# Patient Record
Sex: FEMALE | Race: WHITE | NOT HISPANIC OR LATINO
[De-identification: names, ages, dates, MRNs, and addresses within clinical notes are randomized per-mention and may not be internally consistent; named-entity substitution may affect disease eponyms.]

---

## 2017-06-24 ENCOUNTER — RECORD ABSTRACTING (OUTPATIENT)
Age: 67
End: 2017-06-24

## 2017-06-24 DIAGNOSIS — F17.200 NICOTINE DEPENDENCE, UNSPECIFIED, UNCOMPLICATED: ICD-10-CM

## 2017-06-24 DIAGNOSIS — Z80.3 FAMILY HISTORY OF MALIGNANT NEOPLASM OF BREAST: ICD-10-CM

## 2017-06-24 DIAGNOSIS — L42 PITYRIASIS ROSEA: ICD-10-CM

## 2017-06-24 DIAGNOSIS — H81.10 BENIGN PAROXYSMAL VERTIGO, UNSPECIFIED EAR: ICD-10-CM

## 2017-06-24 DIAGNOSIS — L23.9 ALLERGIC CONTACT DERMATITIS, UNSPECIFIED CAUSE: ICD-10-CM

## 2017-06-27 ENCOUNTER — APPOINTMENT (OUTPATIENT)
Dept: INTERNAL MEDICINE | Facility: CLINIC | Age: 67
End: 2017-06-27

## 2017-06-27 VITALS
RESPIRATION RATE: 18 BRPM | WEIGHT: 110 LBS | SYSTOLIC BLOOD PRESSURE: 148 MMHG | HEART RATE: 64 BPM | HEIGHT: 62 IN | DIASTOLIC BLOOD PRESSURE: 84 MMHG | TEMPERATURE: 97.8 F | OXYGEN SATURATION: 98 % | BODY MASS INDEX: 20.24 KG/M2

## 2017-06-27 DIAGNOSIS — M19.90 UNSPECIFIED OSTEOARTHRITIS, UNSPECIFIED SITE: ICD-10-CM

## 2017-06-27 RX ORDER — CALCIUM CARBONATE/VITAMIN D3 600 MG-20
600-400 TABLET,CHEWABLE ORAL
Refills: 0 | Status: DISCONTINUED | COMMUNITY
End: 2017-06-27

## 2017-06-27 RX ORDER — KRILL/OM-3/DHA/EPA/PHOSPHO/AST 350MG-90MG
CAPSULE ORAL
Refills: 0 | Status: DISCONTINUED | COMMUNITY
End: 2017-06-27

## 2017-12-06 ENCOUNTER — APPOINTMENT (OUTPATIENT)
Dept: INTERNAL MEDICINE | Facility: CLINIC | Age: 67
End: 2017-12-06
Payer: MEDICARE

## 2017-12-06 VITALS
HEART RATE: 77 BPM | BODY MASS INDEX: 19.69 KG/M2 | WEIGHT: 107 LBS | HEIGHT: 62 IN | OXYGEN SATURATION: 100 % | DIASTOLIC BLOOD PRESSURE: 75 MMHG | SYSTOLIC BLOOD PRESSURE: 118 MMHG

## 2017-12-06 DIAGNOSIS — M54.6 PAIN IN THORACIC SPINE: ICD-10-CM

## 2017-12-06 DIAGNOSIS — G89.29 PAIN IN THORACIC SPINE: ICD-10-CM

## 2017-12-06 PROCEDURE — 99214 OFFICE O/P EST MOD 30 MIN: CPT

## 2018-01-08 ENCOUNTER — FORM ENCOUNTER (OUTPATIENT)
Age: 68
End: 2018-01-08

## 2018-01-09 ENCOUNTER — OUTPATIENT (OUTPATIENT)
Dept: OUTPATIENT SERVICES | Facility: HOSPITAL | Age: 68
LOS: 1 days | End: 2018-01-09
Payer: MEDICARE

## 2018-01-09 ENCOUNTER — APPOINTMENT (OUTPATIENT)
Dept: MAMMOGRAPHY | Facility: HOSPITAL | Age: 68
End: 2018-01-09
Payer: MEDICARE

## 2018-01-09 DIAGNOSIS — Z00.8 ENCOUNTER FOR OTHER GENERAL EXAMINATION: ICD-10-CM

## 2018-01-09 PROCEDURE — 77067 SCR MAMMO BI INCL CAD: CPT | Mod: 26

## 2018-01-09 PROCEDURE — 77067 SCR MAMMO BI INCL CAD: CPT

## 2018-01-09 PROCEDURE — 77063 BREAST TOMOSYNTHESIS BI: CPT | Mod: 26

## 2018-01-09 PROCEDURE — 77063 BREAST TOMOSYNTHESIS BI: CPT

## 2018-05-08 ENCOUNTER — MEDICATION RENEWAL (OUTPATIENT)
Age: 68
End: 2018-05-08

## 2018-06-13 ENCOUNTER — APPOINTMENT (OUTPATIENT)
Dept: INTERNAL MEDICINE | Facility: CLINIC | Age: 68
End: 2018-06-13
Payer: MEDICARE

## 2018-06-13 VITALS
DIASTOLIC BLOOD PRESSURE: 80 MMHG | BODY MASS INDEX: 19.88 KG/M2 | HEIGHT: 62 IN | SYSTOLIC BLOOD PRESSURE: 124 MMHG | WEIGHT: 108 LBS

## 2018-06-13 DIAGNOSIS — Z23 ENCOUNTER FOR IMMUNIZATION: ICD-10-CM

## 2018-06-13 DIAGNOSIS — M85.80 OTHER SPECIFIED DISORDERS OF BONE DENSITY AND STRUCTURE, UNSPECIFIED SITE: ICD-10-CM

## 2018-06-13 PROCEDURE — 99497 ADVNCD CARE PLAN 30 MIN: CPT

## 2018-06-13 PROCEDURE — G0439: CPT

## 2018-06-13 NOTE — HEALTH RISK ASSESSMENT
[Very Good] : ~his/her~  mood as very good [No falls in past year] : Patient reported no falls in the past year [0] : 2) Feeling down, depressed, or hopeless: Not at all (0) [Patient reported mammogram was normal] : Patient reported mammogram was normal [Patient reported bone density results were abnormal] : Patient reported bone density results were abnormal [Patient reported colonoscopy was abnormal] : Patient reported colonoscopy was abnormal [HIV Test offered] : HIV Test offered [Hepatitis C test offered] : Hepatitis C test offered [None] : None [Alone] : lives alone [Retired] : retired [College] : College [] :  [# Of Children ___] : has [unfilled] children [Feels Safe at Home] : Feels safe at home [Fully functional (bathing, dressing, toileting, transferring, walking, feeding)] : Fully functional (bathing, dressing, toileting, transferring, walking, feeding) [Fully functional (using the telephone, shopping, preparing meals, housekeeping, doing laundry, using] : Fully functional and needs no help or supervision to perform IADLs (using the telephone, shopping, preparing meals, housekeeping, doing laundry, using transportation, managing medications and managing finances) [Smoke Detector] : smoke detector [Carbon Monoxide Detector] : carbon monoxide detector [Safety elements used in home] : safety elements used in home [Seat Belt] :  uses seat belt [Sunscreen] : uses sunscreen [Discussed at today's visit] : Advance Directives Discussed at today's visit [Patient's preferences updated] : Patient's preferences updated  [Designated Healthcare Proxy] : Designated healthcare proxy [Name: ___] : Health Care Proxy's Name: [unfilled]  [Relationship: ___] : Relationship: [unfilled] [Aggressive treatment] : aggressive treatment [FreeTextEntry1] : "children, finances" no major physical issues, just some arthritis and pain left fingers, Raynauds [] : No [de-identified] : none [de-identified] : rare/social [[id number]] : 0 [Change in mental status noted] : No change in mental status noted [Language] : denies difficulty with language [Behavior] : denies difficulty with behavior [Learning/Retaining New Information] : denies difficulty learning/retaining new information [Handling Complex Tasks] : denies difficulty handling complex tasks [Reasoning] : denies difficulty with reasoning [Spatial Ability and Orientation] : denies difficulty with spatial ability and orientation [Sexually Active] : not sexually active [High Risk Behavior] : no high risk behavior [Reports changes in hearing] : Reports no changes in hearing [Reports changes in vision] : Reports no changes in vision [Reports changes in dental health] : Reports no changes in dental health [Travel to Developing Areas] : does not  travel to developing areas [TB Exposure] : is not being exposed to tuberculosis [Caregiver Concerns] : does not have caregiver concerns [MammogramDate] : 1/2018 [PapSmearDate] : 2016 [BoneDensityDate] : 2016 [BoneDensityComments] : OSTEOPENIA [ColonoscopyDate] : 2008 [ColonoscopyComments] : REFUSES FURTHER; DIVERTICS [de-identified] : son lives next door [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

## 2018-06-14 ENCOUNTER — FORM ENCOUNTER (OUTPATIENT)
Age: 68
End: 2018-06-14

## 2018-06-15 ENCOUNTER — LABORATORY RESULT (OUTPATIENT)
Age: 68
End: 2018-06-15

## 2018-06-15 ENCOUNTER — OUTPATIENT (OUTPATIENT)
Dept: OUTPATIENT SERVICES | Facility: HOSPITAL | Age: 68
LOS: 1 days | End: 2018-06-15
Payer: MEDICARE

## 2018-06-15 ENCOUNTER — APPOINTMENT (OUTPATIENT)
Dept: RADIOLOGY | Facility: HOSPITAL | Age: 68
End: 2018-06-15
Payer: MEDICARE

## 2018-06-15 DIAGNOSIS — E78.00 PURE HYPERCHOLESTEROLEMIA, UNSPECIFIED: ICD-10-CM

## 2018-06-15 DIAGNOSIS — M85.80 OTHER SPECIFIED DISORDERS OF BONE DENSITY AND STRUCTURE, UNSPECIFIED SITE: ICD-10-CM

## 2018-06-15 LAB
ALBUMIN SERPL ELPH-MCNC: 4.3 G/DL
ALP BLD-CCNC: 71 U/L
ALT SERPL-CCNC: 13 U/L
ANION GAP SERPL CALC-SCNC: 17 MMOL/L
AST SERPL-CCNC: 19 U/L
BASOPHILS # BLD AUTO: 0.07 K/UL
BASOPHILS NFR BLD AUTO: 1 %
BILIRUB SERPL-MCNC: 0.4 MG/DL
BUN SERPL-MCNC: 15 MG/DL
CALCIUM SERPL-MCNC: 9.6 MG/DL
CHLORIDE SERPL-SCNC: 97 MMOL/L
CHOLEST SERPL-MCNC: 194 MG/DL
CHOLEST/HDLC SERPL: 2.6 RATIO
CO2 SERPL-SCNC: 24 MMOL/L
CREAT SERPL-MCNC: 0.65 MG/DL
EOSINOPHIL # BLD AUTO: 0.21 K/UL
EOSINOPHIL NFR BLD AUTO: 3.1 %
ERYTHROCYTE [SEDIMENTATION RATE] IN BLOOD BY WESTERGREN METHOD: 16 MM/HR
GLUCOSE SERPL-MCNC: 82 MG/DL
HBA1C MFR BLD HPLC: 5.5 %
HCT VFR BLD CALC: 40.2 %
HCV AB SER QL: NONREACTIVE
HCV S/CO RATIO: 0.09 S/CO
HDLC SERPL-MCNC: 75 MG/DL
HGB BLD-MCNC: 13.1 G/DL
IMM GRANULOCYTES NFR BLD AUTO: 0.1 %
LDLC SERPL CALC-MCNC: 106 MG/DL
LYMPHOCYTES # BLD AUTO: 1.6 K/UL
LYMPHOCYTES NFR BLD AUTO: 24 %
MAN DIFF?: NORMAL
MCHC RBC-ENTMCNC: 29 PG
MCHC RBC-ENTMCNC: 32.6 GM/DL
MCV RBC AUTO: 88.9 FL
MONOCYTES # BLD AUTO: 0.66 K/UL
MONOCYTES NFR BLD AUTO: 9.9 %
NEUTROPHILS # BLD AUTO: 4.13 K/UL
NEUTROPHILS NFR BLD AUTO: 61.9 %
PLATELET # BLD AUTO: 275 K/UL
POTASSIUM SERPL-SCNC: 4.4 MMOL/L
PROT SERPL-MCNC: 6.9 G/DL
RBC # BLD: 4.52 M/UL
RBC # FLD: 14.9 %
RHEUMATOID FACT SER QL: 11 IU/ML
SODIUM SERPL-SCNC: 138 MMOL/L
TRIGL SERPL-MCNC: 67 MG/DL
WBC # FLD AUTO: 6.68 K/UL

## 2018-06-15 PROCEDURE — 77080 DXA BONE DENSITY AXIAL: CPT | Mod: 26

## 2018-06-15 PROCEDURE — 77080 DXA BONE DENSITY AXIAL: CPT

## 2018-06-18 LAB
25(OH)D3 SERPL-MCNC: 34.5 NG/ML
CCP AB SER IA-ACNC: 11 UNITS
ENA SCL70 IGG SER IA-ACNC: <0.2 AL
ENA SS-A AB SER IA-ACNC: <0.2 AL
ENA SS-B AB SER IA-ACNC: <0.2 AL
HIV1+2 AB SPEC QL IA.RAPID: NONREACTIVE
RF+CCP IGG SER-IMP: NEGATIVE
T4 FREE SERPL-MCNC: 1 NG/DL
T4 SERPL-MCNC: 5.8 UG/DL
TSH SERPL-ACNC: 1.73 UIU/ML

## 2018-06-22 LAB
ANA PAT FLD IF-IMP: ABNORMAL
ANACR T: ABNORMAL

## 2019-05-08 ENCOUNTER — RX RENEWAL (OUTPATIENT)
Age: 69
End: 2019-05-08

## 2019-06-04 ENCOUNTER — APPOINTMENT (OUTPATIENT)
Dept: INTERNAL MEDICINE | Facility: CLINIC | Age: 69
End: 2019-06-04
Payer: MEDICARE

## 2019-06-04 VITALS
HEIGHT: 62 IN | DIASTOLIC BLOOD PRESSURE: 80 MMHG | TEMPERATURE: 97.8 F | WEIGHT: 106 LBS | BODY MASS INDEX: 19.51 KG/M2 | SYSTOLIC BLOOD PRESSURE: 130 MMHG | RESPIRATION RATE: 17 BRPM

## 2019-06-04 PROCEDURE — 99214 OFFICE O/P EST MOD 30 MIN: CPT

## 2019-06-04 NOTE — PHYSICAL EXAM
[No Acute Distress] : no acute distress [Well Nourished] : well nourished [Well Developed] : well developed [Well-Appearing] : well-appearing [PERRL] : pupils equal round and reactive to light [Normal Sclera/Conjunctiva] : normal sclera/conjunctiva [EOMI] : extraocular movements intact [Normal Outer Ear/Nose] : the outer ears and nose were normal in appearance [Normal Oropharynx] : the oropharynx was normal [No Lymphadenopathy] : no lymphadenopathy [Supple] : supple [No JVD] : no jugular venous distention [No Respiratory Distress] : no respiratory distress  [Thyroid Normal, No Nodules] : the thyroid was normal and there were no nodules present [Clear to Auscultation] : lungs were clear to auscultation bilaterally [No Accessory Muscle Use] : no accessory muscle use [Normal Rate] : normal rate  [Regular Rhythm] : with a regular rhythm [No Murmur] : no murmur heard [Normal S1, S2] : normal S1 and S2 [No Abdominal Bruit] : a ~M bruit was not heard ~T in the abdomen [No Varicosities] : no varicosities [No Carotid Bruits] : no carotid bruits [No Edema] : there was no peripheral edema [Pedal Pulses Present] : the pedal pulses are present [No Palpable Aorta] : no palpable aorta [Soft] : abdomen soft [No Extremity Clubbing/Cyanosis] : no extremity clubbing/cyanosis [Non-distended] : non-distended [Non Tender] : non-tender [Normal Bowel Sounds] : normal bowel sounds [No Masses] : no abdominal mass palpated [No HSM] : no HSM [Normal Anterior Cervical Nodes] : no anterior cervical lymphadenopathy [Normal Posterior Cervical Nodes] : no posterior cervical lymphadenopathy [No Spinal Tenderness] : no spinal tenderness [No Joint Swelling] : no joint swelling [No CVA Tenderness] : no CVA  tenderness [Grossly Normal Strength/Tone] : grossly normal strength/tone [No Rash] : no rash [Coordination Grossly Intact] : coordination grossly intact [Normal Gait] : normal gait [Deep Tendon Reflexes (DTR)] : deep tendon reflexes were 2+ and symmetric [No Focal Deficits] : no focal deficits [Normal Insight/Judgement] : insight and judgment were intact [Normal Affect] : the affect was normal

## 2019-06-04 NOTE — HISTORY OF PRESENT ILLNESS
[FreeTextEntry1] : Back pain [de-identified] : Got a new mattress and yoga mat and doing stretching\par -doing well\par -not taking aleve\par -had a good winter no major issues shoveling snow and didn't have pain\par -

## 2019-11-27 LAB
25(OH)D3 SERPL-MCNC: 42.3 NG/ML
ALBUMIN SERPL ELPH-MCNC: 4.5 G/DL
ALP BLD-CCNC: 68 U/L
ALT SERPL-CCNC: 13 U/L
ANION GAP SERPL CALC-SCNC: 12 MMOL/L
AST SERPL-CCNC: 17 U/L
BASOPHILS # BLD AUTO: 0.08 K/UL
BASOPHILS NFR BLD AUTO: 1.3 %
BILIRUB SERPL-MCNC: 0.5 MG/DL
BUN SERPL-MCNC: 13 MG/DL
CALCIUM SERPL-MCNC: 9.7 MG/DL
CHLORIDE SERPL-SCNC: 99 MMOL/L
CHOLEST SERPL-MCNC: 191 MG/DL
CHOLEST/HDLC SERPL: 2.3 RATIO
CO2 SERPL-SCNC: 25 MMOL/L
CREAT SERPL-MCNC: 0.6 MG/DL
EOSINOPHIL # BLD AUTO: 0.23 K/UL
EOSINOPHIL NFR BLD AUTO: 3.6 %
ESTIMATED AVERAGE GLUCOSE: 105 MG/DL
GLUCOSE SERPL-MCNC: 97 MG/DL
HBA1C MFR BLD HPLC: 5.3 %
HCT VFR BLD CALC: 39.4 %
HDLC SERPL-MCNC: 82 MG/DL
HGB BLD-MCNC: 13.1 G/DL
IMM GRANULOCYTES NFR BLD AUTO: 0.2 %
LDLC SERPL CALC-MCNC: 92 MG/DL
LYMPHOCYTES # BLD AUTO: 1.41 K/UL
LYMPHOCYTES NFR BLD AUTO: 22.2 %
MAN DIFF?: NORMAL
MCHC RBC-ENTMCNC: 30.5 PG
MCHC RBC-ENTMCNC: 33.2 GM/DL
MCV RBC AUTO: 91.6 FL
MONOCYTES # BLD AUTO: 0.77 K/UL
MONOCYTES NFR BLD AUTO: 12.1 %
NEUTROPHILS # BLD AUTO: 3.85 K/UL
NEUTROPHILS NFR BLD AUTO: 60.6 %
PLATELET # BLD AUTO: 275 K/UL
POTASSIUM SERPL-SCNC: 4.7 MMOL/L
PROT SERPL-MCNC: 6.7 G/DL
RBC # BLD: 4.3 M/UL
RBC # FLD: 14.3 %
SODIUM SERPL-SCNC: 136 MMOL/L
TRIGL SERPL-MCNC: 87 MG/DL
TSH SERPL-ACNC: 1.69 UIU/ML
WBC # FLD AUTO: 6.35 K/UL

## 2019-12-09 ENCOUNTER — FORM ENCOUNTER (OUTPATIENT)
Age: 69
End: 2019-12-09

## 2019-12-10 ENCOUNTER — APPOINTMENT (OUTPATIENT)
Dept: RADIOLOGY | Facility: HOSPITAL | Age: 69
End: 2019-12-10
Payer: MEDICARE

## 2019-12-10 ENCOUNTER — OUTPATIENT (OUTPATIENT)
Dept: OUTPATIENT SERVICES | Facility: HOSPITAL | Age: 69
LOS: 1 days | End: 2019-12-10
Payer: MEDICARE

## 2019-12-10 ENCOUNTER — APPOINTMENT (OUTPATIENT)
Dept: INTERNAL MEDICINE | Facility: CLINIC | Age: 69
End: 2019-12-10
Payer: MEDICARE

## 2019-12-10 VITALS
WEIGHT: 104 LBS | BODY MASS INDEX: 19.14 KG/M2 | SYSTOLIC BLOOD PRESSURE: 130 MMHG | HEART RATE: 70 BPM | DIASTOLIC BLOOD PRESSURE: 70 MMHG | RESPIRATION RATE: 17 BRPM | TEMPERATURE: 97.7 F | HEIGHT: 62 IN

## 2019-12-10 DIAGNOSIS — R42 DIZZINESS AND GIDDINESS: ICD-10-CM

## 2019-12-10 DIAGNOSIS — M25.649 STIFFNESS OF UNSPECIFIED HAND, NOT ELSEWHERE CLASSIFIED: ICD-10-CM

## 2019-12-10 DIAGNOSIS — I73.00 RAYNAUD'S SYNDROME W/OUT GANGRENE: ICD-10-CM

## 2019-12-10 PROCEDURE — 73120 X-RAY EXAM OF HAND: CPT | Mod: 26,50

## 2019-12-10 PROCEDURE — 99214 OFFICE O/P EST MOD 30 MIN: CPT

## 2019-12-10 PROCEDURE — 73120 X-RAY EXAM OF HAND: CPT

## 2019-12-10 NOTE — PHYSICAL EXAM
[No Acute Distress] : no acute distress [Well Nourished] : well nourished [Well Developed] : well developed [Well-Appearing] : well-appearing [Normal Sclera/Conjunctiva] : normal sclera/conjunctiva [PERRL] : pupils equal round and reactive to light [EOMI] : extraocular movements intact [Normal Outer Ear/Nose] : the outer ears and nose were normal in appearance [Normal Oropharynx] : the oropharynx was normal [No JVD] : no jugular venous distention [No Lymphadenopathy] : no lymphadenopathy [Supple] : supple [Thyroid Normal, No Nodules] : the thyroid was normal and there were no nodules present [No Respiratory Distress] : no respiratory distress  [No Accessory Muscle Use] : no accessory muscle use [Clear to Auscultation] : lungs were clear to auscultation bilaterally [Regular Rhythm] : with a regular rhythm [Normal Rate] : normal rate  [Normal S1, S2] : normal S1 and S2 [No Murmur] : no murmur heard [No Carotid Bruits] : no carotid bruits [No Varicosities] : no varicosities [No Abdominal Bruit] : a ~M bruit was not heard ~T in the abdomen [Pedal Pulses Present] : the pedal pulses are present [No Edema] : there was no peripheral edema [No Palpable Aorta] : no palpable aorta [No Extremity Clubbing/Cyanosis] : no extremity clubbing/cyanosis [Soft] : abdomen soft [Non Tender] : non-tender [Non-distended] : non-distended [No Masses] : no abdominal mass palpated [No HSM] : no HSM [Normal Bowel Sounds] : normal bowel sounds [Normal Posterior Cervical Nodes] : no posterior cervical lymphadenopathy [Normal Anterior Cervical Nodes] : no anterior cervical lymphadenopathy [No CVA Tenderness] : no CVA  tenderness [No Spinal Tenderness] : no spinal tenderness [No Joint Swelling] : no joint swelling [Grossly Normal Strength/Tone] : grossly normal strength/tone [No Rash] : no rash [Coordination Grossly Intact] : coordination grossly intact [No Focal Deficits] : no focal deficits [Normal Gait] : normal gait [Deep Tendon Reflexes (DTR)] : deep tendon reflexes were 2+ and symmetric [Normal Affect] : the affect was normal [Normal Insight/Judgement] : insight and judgment were intact [de-identified] : fingers were cold and at some sites white  but 2+ radial pulses

## 2019-12-10 NOTE — HISTORY OF PRESENT ILLNESS
[FreeTextEntry1] : Hand swelling and toe pain [de-identified] : Since the summer her Lisinopril generic was changed and pill now bigger\par -has hand swelling and stiffness in AM\par -right toe that previously injured was taking long to heal a sore on the bottom\par \par Also felt occasional lightheadedness\par -when bending down\par -occasionally, 1-2x per week

## 2020-06-17 ENCOUNTER — APPOINTMENT (OUTPATIENT)
Dept: INTERNAL MEDICINE | Facility: CLINIC | Age: 70
End: 2020-06-17
Payer: MEDICARE

## 2020-06-17 VITALS
TEMPERATURE: 98.1 F | HEIGHT: 62 IN | OXYGEN SATURATION: 100 % | RESPIRATION RATE: 17 BRPM | SYSTOLIC BLOOD PRESSURE: 130 MMHG | WEIGHT: 105 LBS | DIASTOLIC BLOOD PRESSURE: 70 MMHG | BODY MASS INDEX: 19.32 KG/M2 | HEART RATE: 72 BPM

## 2020-06-17 DIAGNOSIS — Z28.21 IMMUNIZATION NOT CARRIED OUT BECAUSE OF PATIENT REFUSAL: ICD-10-CM

## 2020-06-17 PROCEDURE — 99213 OFFICE O/P EST LOW 20 MIN: CPT

## 2020-06-17 RX ORDER — HYDROCORTISONE 25 MG/G
2.5 CREAM TOPICAL
Refills: 0 | Status: DISCONTINUED | COMMUNITY
End: 2020-06-17

## 2020-06-17 RX ORDER — ZOSTER VACCINE RECOMBINANT, ADJUVANTED 50 MCG/0.5
50 KIT INTRAMUSCULAR
Qty: 1 | Refills: 0 | Status: DISCONTINUED | OUTPATIENT
Start: 2018-06-13 | End: 2020-06-17

## 2020-06-17 RX ORDER — NYSTATIN AND TRIAMCINOLONE ACETONIDE 100000; 1 MG/G; MG/G
100000-0.1 CREAM TOPICAL
Refills: 0 | Status: DISCONTINUED | COMMUNITY
End: 2020-06-17

## 2020-06-17 NOTE — PHYSICAL EXAM
[No Acute Distress] : no acute distress [Well Nourished] : well nourished [Well Developed] : well developed [Normal Sclera/Conjunctiva] : normal sclera/conjunctiva [Well-Appearing] : well-appearing [PERRL] : pupils equal round and reactive to light [Normal Outer Ear/Nose] : the outer ears and nose were normal in appearance [EOMI] : extraocular movements intact [Normal Oropharynx] : the oropharynx was normal [No JVD] : no jugular venous distention [No Lymphadenopathy] : no lymphadenopathy [Supple] : supple [Thyroid Normal, No Nodules] : the thyroid was normal and there were no nodules present [No Accessory Muscle Use] : no accessory muscle use [No Respiratory Distress] : no respiratory distress  [Clear to Auscultation] : lungs were clear to auscultation bilaterally [Normal Rate] : normal rate  [Regular Rhythm] : with a regular rhythm [Normal S1, S2] : normal S1 and S2 [No Murmur] : no murmur heard [No Carotid Bruits] : no carotid bruits [No Varicosities] : no varicosities [No Abdominal Bruit] : a ~M bruit was not heard ~T in the abdomen [Pedal Pulses Present] : the pedal pulses are present [No Edema] : there was no peripheral edema [No Palpable Aorta] : no palpable aorta [No Extremity Clubbing/Cyanosis] : no extremity clubbing/cyanosis [Soft] : abdomen soft [Non Tender] : non-tender [No Masses] : no abdominal mass palpated [Non-distended] : non-distended [No HSM] : no HSM [Normal Posterior Cervical Nodes] : no posterior cervical lymphadenopathy [Normal Bowel Sounds] : normal bowel sounds [Normal Anterior Cervical Nodes] : no anterior cervical lymphadenopathy [No CVA Tenderness] : no CVA  tenderness [No Spinal Tenderness] : no spinal tenderness [No Joint Swelling] : no joint swelling [Grossly Normal Strength/Tone] : grossly normal strength/tone [No Rash] : no rash [No Focal Deficits] : no focal deficits [Coordination Grossly Intact] : coordination grossly intact [Normal Gait] : normal gait [Deep Tendon Reflexes (DTR)] : deep tendon reflexes were 2+ and symmetric [Normal Affect] : the affect was normal [Normal Insight/Judgement] : insight and judgment were intact

## 2020-06-17 NOTE — HISTORY OF PRESENT ILLNESS
[FreeTextEntry1] : General follow  [de-identified] : Had a friend who  of COVID-19\par -states local IGA had some COVID\par Cousin  also COVID\par \par in  had cold symptoms and thinks she might have had COVID  \par -3 week flu like illness\par

## 2020-07-13 ENCOUNTER — OUTPATIENT (OUTPATIENT)
Dept: OUTPATIENT SERVICES | Facility: HOSPITAL | Age: 70
LOS: 1 days | End: 2020-07-13
Payer: MEDICARE

## 2020-07-13 ENCOUNTER — APPOINTMENT (OUTPATIENT)
Dept: MAMMOGRAPHY | Facility: HOSPITAL | Age: 70
End: 2020-07-13
Payer: MEDICARE

## 2020-07-13 ENCOUNTER — RESULT REVIEW (OUTPATIENT)
Age: 70
End: 2020-07-13

## 2020-07-13 DIAGNOSIS — Z00.8 ENCOUNTER FOR OTHER GENERAL EXAMINATION: ICD-10-CM

## 2020-07-13 PROCEDURE — 77067 SCR MAMMO BI INCL CAD: CPT | Mod: 26

## 2020-07-13 PROCEDURE — 77067 SCR MAMMO BI INCL CAD: CPT

## 2020-07-13 PROCEDURE — 77063 BREAST TOMOSYNTHESIS BI: CPT

## 2020-07-13 PROCEDURE — 77063 BREAST TOMOSYNTHESIS BI: CPT | Mod: 26

## 2020-11-05 DIAGNOSIS — Z11.59 ENCOUNTER FOR SCREENING FOR OTHER VIRAL DISEASES: ICD-10-CM

## 2020-11-10 LAB
25(OH)D3 SERPL-MCNC: 45.2 NG/ML
ALBUMIN SERPL ELPH-MCNC: 4.7 G/DL
ALP BLD-CCNC: 75 U/L
ALT SERPL-CCNC: 15 U/L
ANION GAP SERPL CALC-SCNC: 13 MMOL/L
AST SERPL-CCNC: 21 U/L
BASOPHILS # BLD AUTO: 0.08 K/UL
BASOPHILS NFR BLD AUTO: 0.8 %
BILIRUB SERPL-MCNC: 0.4 MG/DL
BUN SERPL-MCNC: 15 MG/DL
CALCIUM SERPL-MCNC: 9.6 MG/DL
CHLORIDE SERPL-SCNC: 98 MMOL/L
CHOLEST SERPL-MCNC: 201 MG/DL
CO2 SERPL-SCNC: 26 MMOL/L
CREAT SERPL-MCNC: 0.61 MG/DL
EOSINOPHIL # BLD AUTO: 0.1 K/UL
EOSINOPHIL NFR BLD AUTO: 1.1 %
ESTIMATED AVERAGE GLUCOSE: 105 MG/DL
GLUCOSE SERPL-MCNC: 92 MG/DL
HBA1C MFR BLD HPLC: 5.3 %
HCT VFR BLD CALC: 39.4 %
HDLC SERPL-MCNC: 80 MG/DL
HGB BLD-MCNC: 12.8 G/DL
IMM GRANULOCYTES NFR BLD AUTO: 0.2 %
LDLC SERPL CALC-MCNC: 106 MG/DL
LYMPHOCYTES # BLD AUTO: 0.77 K/UL
LYMPHOCYTES NFR BLD AUTO: 8.1 %
MAN DIFF?: NORMAL
MCHC RBC-ENTMCNC: 30.3 PG
MCHC RBC-ENTMCNC: 32.5 GM/DL
MCV RBC AUTO: 93.4 FL
MONOCYTES # BLD AUTO: 0.8 K/UL
MONOCYTES NFR BLD AUTO: 8.5 %
NEUTROPHILS # BLD AUTO: 7.69 K/UL
NEUTROPHILS NFR BLD AUTO: 81.3 %
NONHDLC SERPL-MCNC: 121 MG/DL
PLATELET # BLD AUTO: 282 K/UL
POTASSIUM SERPL-SCNC: 4.3 MMOL/L
PROT SERPL-MCNC: 6.5 G/DL
RBC # BLD: 4.22 M/UL
RBC # FLD: 14.4 %
SARS-COV-2 IGG SERPL IA-ACNC: <0.1 INDEX
SARS-COV-2 IGG SERPL QL IA: NEGATIVE
SODIUM SERPL-SCNC: 137 MMOL/L
TRIGL SERPL-MCNC: 71 MG/DL
TSH SERPL-ACNC: 0.87 UIU/ML
WBC # FLD AUTO: 9.46 K/UL

## 2020-12-16 ENCOUNTER — APPOINTMENT (OUTPATIENT)
Dept: INTERNAL MEDICINE | Facility: CLINIC | Age: 70
End: 2020-12-16

## 2021-06-16 ENCOUNTER — APPOINTMENT (OUTPATIENT)
Dept: INTERNAL MEDICINE | Facility: CLINIC | Age: 71
End: 2021-06-16
Payer: MEDICARE

## 2021-06-16 ENCOUNTER — NON-APPOINTMENT (OUTPATIENT)
Age: 71
End: 2021-06-16

## 2021-06-16 VITALS
WEIGHT: 102 LBS | BODY MASS INDEX: 18.77 KG/M2 | HEIGHT: 62 IN | TEMPERATURE: 97.9 F | RESPIRATION RATE: 17 BRPM | HEART RATE: 73 BPM | DIASTOLIC BLOOD PRESSURE: 74 MMHG | SYSTOLIC BLOOD PRESSURE: 110 MMHG

## 2021-06-16 DIAGNOSIS — Z00.00 ENCOUNTER FOR GENERAL ADULT MEDICAL EXAMINATION W/OUT ABNORMAL FINDINGS: ICD-10-CM

## 2021-06-16 DIAGNOSIS — M25.649 STIFFNESS OF UNSPECIFIED HAND, NOT ELSEWHERE CLASSIFIED: ICD-10-CM

## 2021-06-16 PROCEDURE — G0442 ANNUAL ALCOHOL SCREEN 15 MIN: CPT | Mod: 59

## 2021-06-16 PROCEDURE — 93000 ELECTROCARDIOGRAM COMPLETE: CPT | Mod: 59

## 2021-06-16 PROCEDURE — G0438: CPT

## 2021-06-16 RX ORDER — CHOLECALCIFEROL (VITAMIN D3) 25 MCG
TABLET ORAL
Refills: 0 | Status: ACTIVE | COMMUNITY

## 2021-06-16 NOTE — HEALTH RISK ASSESSMENT
[Good] : ~his/her~  mood as  good [Yes] : Yes [2 - 4 times a month (2 pts)] : 2-4 times a month (2 points) [1 or 2 (0 pts)] : 1 or 2 (0 points) [Never (0 pts)] : Never (0 points) [No] : In the past 12 months have you used drugs other than those required for medical reasons? No [No falls in past year] : Patient reported no falls in the past year [0] : 2) Feeling down, depressed, or hopeless: Not at all (0) [(PHQ-2) Unable to screen] : PHQ-2: unable to screen [Patient reported mammogram was normal] : Patient reported mammogram was normal [Patient reported PAP Smear was normal] : Patient reported PAP Smear was normal [Patient reported bone density results were normal] : Patient reported bone density results were normal [Patient declined colonoscopy] : Patient declined colonoscopy [HIV test declined] : HIV test declined [Hepatitis C test declined] : Hepatitis C test declined [None] : None [Alone] : lives alone [Retired] : retired [College] : College [Single] : single [Feels Safe at Home] : Feels safe at home [Fully functional (bathing, dressing, toileting, transferring, walking, feeding)] : Fully functional (bathing, dressing, toileting, transferring, walking, feeding) [] : No [de-identified] : none [de-identified] : none [de-identified] : exercising more [de-identified] : healthy [[id number]] : 0 [Change in mental status noted] : No change in mental status noted [Language] : denies difficulty with language [Behavior] : denies difficulty with behavior [Learning/Retaining New Information] : denies difficulty learning/retaining new information [Handling Complex Tasks] : denies difficulty handling complex tasks [Reasoning] : denies difficulty with reasoning [Spatial Ability and Orientation] : denies difficulty with spatial ability and orientation [Sexually Active] : not sexually active [High Risk Behavior] : no high risk behavior [Reports changes in hearing] : Reports no changes in hearing [Reports changes in vision] : Reports no changes in vision [Reports changes in dental health] : Reports no changes in dental health [MammogramDate] : 7/2020 [PapSmearDate] : 2018 [BoneDensityDate] : 2018 [ColonoscopyDate] : 2008 [ColonoscopyComments] : Refuses stool test

## 2021-06-16 NOTE — HISTORY OF PRESENT ILLNESS
[FreeTextEntry1] : Here for annual [de-identified] : Had Moderna and still tired since beg May\par Fatigued\par  - taking B vitamins slight relief\par -exercising more doing pushups on stairs, stretching doing more and more\par \par \par \par

## 2021-06-21 ENCOUNTER — LABORATORY RESULT (OUTPATIENT)
Age: 71
End: 2021-06-21

## 2021-07-09 ENCOUNTER — APPOINTMENT (OUTPATIENT)
Dept: INTERNAL MEDICINE | Facility: CLINIC | Age: 71
End: 2021-07-09

## 2021-07-09 LAB
25(OH)D3 SERPL-MCNC: 44.6 NG/ML
ALBUMIN SERPL ELPH-MCNC: 4.3 G/DL
ALP BLD-CCNC: 72 U/L
ALT SERPL-CCNC: 10 U/L
ANA PAT FLD IF-IMP: ABNORMAL
ANACR T: ABNORMAL
ANION GAP SERPL CALC-SCNC: 13 MMOL/L
APPEARANCE: CLEAR
AST SERPL-CCNC: 16 U/L
BACTERIA: NEGATIVE
BASOPHILS # BLD AUTO: 0.07 K/UL
BASOPHILS NFR BLD AUTO: 1.1 %
BILIRUB SERPL-MCNC: 0.4 MG/DL
BILIRUBIN URINE: NEGATIVE
BLOOD URINE: NEGATIVE
BUN SERPL-MCNC: 12 MG/DL
CALCIUM SERPL-MCNC: 9.8 MG/DL
CCP AB SER IA-ACNC: <8 UNITS
CHLORIDE SERPL-SCNC: 98 MMOL/L
CHOLEST SERPL-MCNC: 191 MG/DL
CO2 SERPL-SCNC: 23 MMOL/L
COLOR: NORMAL
CREAT SERPL-MCNC: 0.66 MG/DL
CRP SERPL-MCNC: <3 MG/L
EOSINOPHIL # BLD AUTO: 0.17 K/UL
EOSINOPHIL NFR BLD AUTO: 2.7 %
ERYTHROCYTE [SEDIMENTATION RATE] IN BLOOD BY WESTERGREN METHOD: 10 MM/HR
ESTIMATED AVERAGE GLUCOSE: 108 MG/DL
FOLATE SERPL-MCNC: 18.5 NG/ML
GLUCOSE QUALITATIVE U: NEGATIVE
GLUCOSE SERPL-MCNC: 99 MG/DL
HBA1C MFR BLD HPLC: 5.4 %
HCT VFR BLD CALC: 40.1 %
HDLC SERPL-MCNC: 80 MG/DL
HGB BLD-MCNC: 13.6 G/DL
HYALINE CASTS: 1 /LPF
IMM GRANULOCYTES NFR BLD AUTO: 0.2 %
KETONES URINE: NEGATIVE
LDLC SERPL CALC-MCNC: 94 MG/DL
LEUKOCYTE ESTERASE URINE: NEGATIVE
LYMPHOCYTES # BLD AUTO: 1.57 K/UL
LYMPHOCYTES NFR BLD AUTO: 25.2 %
MAN DIFF?: NORMAL
MCHC RBC-ENTMCNC: 30.7 PG
MCHC RBC-ENTMCNC: 33.9 GM/DL
MCV RBC AUTO: 90.5 FL
MICROSCOPIC-UA: NORMAL
MONOCYTES # BLD AUTO: 0.71 K/UL
MONOCYTES NFR BLD AUTO: 11.4 %
NEUTROPHILS # BLD AUTO: 3.71 K/UL
NEUTROPHILS NFR BLD AUTO: 59.4 %
NITRITE URINE: NEGATIVE
NONHDLC SERPL-MCNC: 111 MG/DL
PH URINE: 7
PLATELET # BLD AUTO: 271 K/UL
POTASSIUM SERPL-SCNC: 5 MMOL/L
PROT SERPL-MCNC: 6.5 G/DL
PROTEIN URINE: NEGATIVE
RBC # BLD: 4.43 M/UL
RBC # FLD: 14.3 %
RED BLOOD CELLS URINE: 2 /HPF
RF+CCP IGG SER-IMP: NEGATIVE
RHEUMATOID FACT SER QL: 12 IU/ML
SODIUM SERPL-SCNC: 134 MMOL/L
SPECIFIC GRAVITY URINE: 1.01
SQUAMOUS EPITHELIAL CELLS: 1 /HPF
T4 FREE SERPL-MCNC: 1 NG/DL
T4 SERPL-MCNC: 4.6 UG/DL
TRIGL SERPL-MCNC: 85 MG/DL
TSH SERPL-ACNC: 2.07 UIU/ML
UROBILINOGEN URINE: NORMAL
VIT B12 SERPL-MCNC: 820 PG/ML
WBC # FLD AUTO: 6.24 K/UL
WHITE BLOOD CELLS URINE: 2 /HPF

## 2021-09-07 ENCOUNTER — RX RENEWAL (OUTPATIENT)
Age: 71
End: 2021-09-07

## 2021-10-06 PROBLEM — Z28.21 REFUSED PNEUMOCOCCAL VACCINATION: Status: ACTIVE | Noted: 2018-06-13

## 2022-06-16 ENCOUNTER — APPOINTMENT (OUTPATIENT)
Dept: INTERNAL MEDICINE | Facility: CLINIC | Age: 72
End: 2022-06-16
Payer: MEDICARE

## 2022-06-16 VITALS
DIASTOLIC BLOOD PRESSURE: 70 MMHG | BODY MASS INDEX: 17.85 KG/M2 | RESPIRATION RATE: 14 BRPM | WEIGHT: 97 LBS | SYSTOLIC BLOOD PRESSURE: 110 MMHG | HEART RATE: 74 BPM | TEMPERATURE: 96.9 F | HEIGHT: 62 IN

## 2022-06-16 DIAGNOSIS — N39.0 URINARY TRACT INFECTION, SITE NOT SPECIFIED: ICD-10-CM

## 2022-06-16 DIAGNOSIS — J30.9 ALLERGIC RHINITIS, UNSPECIFIED: ICD-10-CM

## 2022-06-16 DIAGNOSIS — E78.00 PURE HYPERCHOLESTEROLEMIA, UNSPECIFIED: ICD-10-CM

## 2022-06-16 PROCEDURE — 99213 OFFICE O/P EST LOW 20 MIN: CPT

## 2022-06-16 RX ORDER — LISINOPRIL AND HYDROCHLOROTHIAZIDE TABLETS 10; 12.5 MG/1; MG/1
10-12.5 TABLET ORAL
Qty: 90 | Refills: 3 | Status: DISCONTINUED | COMMUNITY
Start: 1900-01-01 | End: 2022-06-16

## 2022-06-16 RX ORDER — LISINOPRIL AND HYDROCHLOROTHIAZIDE TABLETS 20; 12.5 MG/1; MG/1
20-12.5 TABLET ORAL
Qty: 90 | Refills: 3 | Status: DISCONTINUED | COMMUNITY
Start: 2021-09-07 | End: 2022-06-16

## 2022-06-16 NOTE — HISTORY OF PRESENT ILLNESS
[FreeTextEntry1] : UTI [de-identified] : had symptoms of UTI\par States she called in May no call back\par so decided to take cranberry caps and water and it subsided\par \par Pt then stated it improved but low back pain and burning and urine still cloudy\par \par \par \par

## 2022-06-20 LAB
APPEARANCE: ABNORMAL
BACTERIA UR CULT: ABNORMAL
BACTERIA: ABNORMAL
BILIRUBIN URINE: NEGATIVE
BLOOD URINE: ABNORMAL
COLOR: ABNORMAL
GLUCOSE QUALITATIVE U: NEGATIVE
HYALINE CASTS: 0 /LPF
KETONES URINE: ABNORMAL
LEUKOCYTE ESTERASE URINE: ABNORMAL
MICROSCOPIC-UA: NORMAL
NITRITE URINE: POSITIVE
PH URINE: 6.5
PROTEIN URINE: ABNORMAL
RED BLOOD CELLS URINE: 40 /HPF
SPECIFIC GRAVITY URINE: 1.02
SQUAMOUS EPITHELIAL CELLS: 1 /HPF
UROBILINOGEN URINE: NORMAL
WHITE BLOOD CELLS URINE: >720 /HPF

## 2022-07-13 ENCOUNTER — OUTPATIENT (OUTPATIENT)
Dept: OUTPATIENT SERVICES | Facility: HOSPITAL | Age: 72
LOS: 1 days | End: 2022-07-13
Payer: MEDICARE

## 2022-07-13 ENCOUNTER — RESULT REVIEW (OUTPATIENT)
Age: 72
End: 2022-07-13

## 2022-07-13 ENCOUNTER — APPOINTMENT (OUTPATIENT)
Dept: ULTRASOUND IMAGING | Facility: HOSPITAL | Age: 72
End: 2022-07-13

## 2022-07-13 ENCOUNTER — APPOINTMENT (OUTPATIENT)
Dept: MAMMOGRAPHY | Facility: HOSPITAL | Age: 72
End: 2022-07-13

## 2022-07-13 DIAGNOSIS — Z00.8 ENCOUNTER FOR OTHER GENERAL EXAMINATION: ICD-10-CM

## 2022-07-13 PROCEDURE — 77063 BREAST TOMOSYNTHESIS BI: CPT

## 2022-07-13 PROCEDURE — 77067 SCR MAMMO BI INCL CAD: CPT | Mod: 26

## 2022-07-13 PROCEDURE — 76641 ULTRASOUND BREAST COMPLETE: CPT | Mod: 26,50

## 2022-07-13 PROCEDURE — 77063 BREAST TOMOSYNTHESIS BI: CPT | Mod: 26

## 2022-07-13 PROCEDURE — 76641 ULTRASOUND BREAST COMPLETE: CPT

## 2022-07-13 PROCEDURE — 77067 SCR MAMMO BI INCL CAD: CPT

## 2022-07-17 LAB
25(OH)D3 SERPL-MCNC: 52 NG/ML
ALBUMIN SERPL ELPH-MCNC: 4.4 G/DL
ALP BLD-CCNC: 65 U/L
ALT SERPL-CCNC: 11 U/L
ANION GAP SERPL CALC-SCNC: 13 MMOL/L
AST SERPL-CCNC: 18 U/L
BASOPHILS # BLD AUTO: 0.07 K/UL
BASOPHILS NFR BLD AUTO: 1.1 %
BILIRUB SERPL-MCNC: 0.3 MG/DL
BUN SERPL-MCNC: 16 MG/DL
CALCIUM SERPL-MCNC: 9.5 MG/DL
CHLORIDE SERPL-SCNC: 104 MMOL/L
CHOLEST SERPL-MCNC: 189 MG/DL
CO2 SERPL-SCNC: 23 MMOL/L
CREAT SERPL-MCNC: 0.68 MG/DL
CRP SERPL HS-MCNC: 0.89 MG/L
EGFR: 92 ML/MIN/1.73M2
EOSINOPHIL # BLD AUTO: 0.16 K/UL
EOSINOPHIL NFR BLD AUTO: 2.4 %
ERYTHROCYTE [SEDIMENTATION RATE] IN BLOOD BY WESTERGREN METHOD: 16 MM/HR
ESTIMATED AVERAGE GLUCOSE: 108 MG/DL
GLUCOSE SERPL-MCNC: 91 MG/DL
HBA1C MFR BLD HPLC: 5.4 %
HCT VFR BLD CALC: 36.7 %
HDLC SERPL-MCNC: 76 MG/DL
HGB BLD-MCNC: 12.3 G/DL
IMM GRANULOCYTES NFR BLD AUTO: 0.3 %
LDLC SERPL CALC-MCNC: 99 MG/DL
LYMPHOCYTES # BLD AUTO: 1.32 K/UL
LYMPHOCYTES NFR BLD AUTO: 20.1 %
MAN DIFF?: NORMAL
MCHC RBC-ENTMCNC: 30.9 PG
MCHC RBC-ENTMCNC: 33.5 GM/DL
MCV RBC AUTO: 92.2 FL
MONOCYTES # BLD AUTO: 0.62 K/UL
MONOCYTES NFR BLD AUTO: 9.5 %
NEUTROPHILS # BLD AUTO: 4.37 K/UL
NEUTROPHILS NFR BLD AUTO: 66.6 %
NONHDLC SERPL-MCNC: 113 MG/DL
PLATELET # BLD AUTO: 272 K/UL
POTASSIUM SERPL-SCNC: 4.5 MMOL/L
PROT SERPL-MCNC: 6.7 G/DL
RBC # BLD: 3.98 M/UL
RBC # FLD: 14.6 %
SODIUM SERPL-SCNC: 140 MMOL/L
TRIGL SERPL-MCNC: 69 MG/DL
TSH SERPL-ACNC: 1.52 UIU/ML
WBC # FLD AUTO: 6.56 K/UL

## 2023-06-23 ENCOUNTER — NON-APPOINTMENT (OUTPATIENT)
Age: 73
End: 2023-06-23

## 2023-06-23 ENCOUNTER — APPOINTMENT (OUTPATIENT)
Dept: INTERNAL MEDICINE | Facility: CLINIC | Age: 73
End: 2023-06-23
Payer: MEDICARE

## 2023-06-23 VITALS
DIASTOLIC BLOOD PRESSURE: 98 MMHG | WEIGHT: 98.31 LBS | OXYGEN SATURATION: 95 % | TEMPERATURE: 98.7 F | HEART RATE: 88 BPM | SYSTOLIC BLOOD PRESSURE: 142 MMHG | BODY MASS INDEX: 18.09 KG/M2 | HEIGHT: 62 IN | RESPIRATION RATE: 16 BRPM

## 2023-06-23 DIAGNOSIS — R21 RASH AND OTHER NONSPECIFIC SKIN ERUPTION: ICD-10-CM

## 2023-06-23 DIAGNOSIS — Z00.00 ENCOUNTER FOR GENERAL ADULT MEDICAL EXAMINATION W/OUT ABNORMAL FINDINGS: ICD-10-CM

## 2023-06-23 DIAGNOSIS — I10 ESSENTIAL (PRIMARY) HYPERTENSION: ICD-10-CM

## 2023-06-23 PROCEDURE — 93000 ELECTROCARDIOGRAM COMPLETE: CPT

## 2023-06-23 RX ORDER — SULFAMETHOXAZOLE AND TRIMETHOPRIM 800; 160 MG/1; MG/1
800-160 TABLET ORAL
Qty: 10 | Refills: 0 | Status: COMPLETED | COMMUNITY
Start: 2022-06-16 | End: 2023-06-23

## 2023-06-23 RX ORDER — LISINOPRIL 5 MG/1
5 TABLET ORAL DAILY
Qty: 90 | Refills: 3 | Status: DISCONTINUED | COMMUNITY
Start: 2022-06-16 | End: 2023-06-23

## 2023-06-23 NOTE — HISTORY OF PRESENT ILLNESS
[de-identified] : Doing great\par has different lisinopril now and stopped it after 33 days different pill color and shape and developed rash around ankles "crusty line" itchy, no swelling only around ankles \par \par felt itchy around chest and abd\par \par  [FreeTextEntry1] : stoped BP meds

## 2023-06-23 NOTE — HEALTH RISK ASSESSMENT
[Excellent] : ~his/her~  mood as  excellent [No] : In the past 12 months have you used drugs other than those required for medical reasons? No [No falls in past year] : Patient reported no falls in the past year [0] : 2) Feeling down, depressed, or hopeless: Not at all (0) [PHQ-2 Positive] : PHQ-2 Positive [Patient reported mammogram was normal] : Patient reported mammogram was normal [Patient declined PAP Smear] : Patient declined PAP Smear [Patient declined bone density test] : Patient declined bone density test [Patient declined colonoscopy] : Patient declined colonoscopy [HIV test declined] : HIV test declined [Hepatitis C test declined] : Hepatitis C test declined [None] : None [] :  [Feels Safe at Home] : Feels safe at home [Fully functional (bathing, dressing, toileting, transferring, walking, feeding)] : Fully functional (bathing, dressing, toileting, transferring, walking, feeding) [Fully functional (using the telephone, shopping, preparing meals, housekeeping, doing laundry, using] : Fully functional and needs no help or supervision to perform IADLs (using the telephone, shopping, preparing meals, housekeeping, doing laundry, using transportation, managing medications and managing finances) [Yes] : Yes [2 - 4 times a month (2 pts)] : 2-4 times a month (2 points) [1 or 2 (0 pts)] : 1 or 2 (0 points) [Never (0 pts)] : Never (0 points) [Audit-CScore] : 2 [de-identified] : starting [de-identified] : good [PHH6Ietnh] : 0 [Change in mental status noted] : No change in mental status noted [Language] : denies difficulty with language [Behavior] : denies difficulty with behavior [Learning/Retaining New Information] : denies difficulty learning/retaining new information [Handling Complex Tasks] : denies difficulty handling complex tasks [Reasoning] : denies difficulty with reasoning [Spatial Ability and Orientation] : denies difficulty with spatial ability and orientation [Sexually Active] : not sexually active [Reports changes in hearing] : Reports no changes in hearing [Reports changes in vision] : Reports no changes in vision [Reports changes in dental health] : Reports no changes in dental health [MammogramDate] : 2022

## 2023-06-23 NOTE — PHYSICAL EXAM
[No Acute Distress] : no acute distress [Well Nourished] : well nourished [Well Developed] : well developed [Well-Appearing] : well-appearing [Normal Sclera/Conjunctiva] : normal sclera/conjunctiva [PERRL] : pupils equal round and reactive to light [EOMI] : extraocular movements intact [Normal Outer Ear/Nose] : the outer ears and nose were normal in appearance [Normal Oropharynx] : the oropharynx was normal [No JVD] : no jugular venous distention [No Lymphadenopathy] : no lymphadenopathy [Supple] : supple [Thyroid Normal, No Nodules] : the thyroid was normal and there were no nodules present [No Respiratory Distress] : no respiratory distress  [No Accessory Muscle Use] : no accessory muscle use [Clear to Auscultation] : lungs were clear to auscultation bilaterally [Normal Rate] : normal rate  [Regular Rhythm] : with a regular rhythm [Normal S1, S2] : normal S1 and S2 [No Murmur] : no murmur heard [No Carotid Bruits] : no carotid bruits [No Abdominal Bruit] : a ~M bruit was not heard ~T in the abdomen [No Varicosities] : no varicosities [Pedal Pulses Present] : the pedal pulses are present [No Edema] : there was no peripheral edema [No Palpable Aorta] : no palpable aorta [No Extremity Clubbing/Cyanosis] : no extremity clubbing/cyanosis [Soft] : abdomen soft [Non Tender] : non-tender [Non-distended] : non-distended [No Masses] : no abdominal mass palpated [No HSM] : no HSM [Normal Bowel Sounds] : normal bowel sounds [No CVA Tenderness] : no CVA  tenderness [No Spinal Tenderness] : no spinal tenderness [No Joint Swelling] : no joint swelling [Grossly Normal Strength/Tone] : grossly normal strength/tone [No Rash] : no rash [Coordination Grossly Intact] : coordination grossly intact [No Focal Deficits] : no focal deficits [Normal Gait] : normal gait [Deep Tendon Reflexes (DTR)] : deep tendon reflexes were 2+ and symmetric [Normal Affect] : the affect was normal [Normal Insight/Judgement] : insight and judgment were intact [de-identified] : ?vitiligo +venous insufficiency dermatitis bilat ankles

## 2023-06-23 NOTE — ASSESSMENT
[FreeTextEntry1] : exercise\par refuses all HM Colonoscopy\par refusing BP meds at this time will try exercise \par \par RTO 6mos for BP check

## 2023-07-11 LAB
25(OH)D3 SERPL-MCNC: 44.7 NG/ML
ALBUMIN SERPL ELPH-MCNC: 4.7 G/DL
ALP BLD-CCNC: 90 U/L
ALT SERPL-CCNC: 12 U/L
ANION GAP SERPL CALC-SCNC: 13 MMOL/L
APPEARANCE: CLEAR
AST SERPL-CCNC: 19 U/L
BACTERIA: NEGATIVE /HPF
BILIRUB SERPL-MCNC: 0.4 MG/DL
BILIRUBIN URINE: NEGATIVE
BLOOD URINE: NEGATIVE
BUN SERPL-MCNC: 19 MG/DL
CALCIUM SERPL-MCNC: 9.8 MG/DL
CAST: 1 /LPF
CHLORIDE SERPL-SCNC: 102 MMOL/L
CHOLEST SERPL-MCNC: 203 MG/DL
CO2 SERPL-SCNC: 27 MMOL/L
COLOR: YELLOW
CREAT SERPL-MCNC: 0.63 MG/DL
EGFR: 93 ML/MIN/1.73M2
EPITHELIAL CELLS: 3 /HPF
ESTIMATED AVERAGE GLUCOSE: 111 MG/DL
GLUCOSE QUALITATIVE U: NEGATIVE MG/DL
GLUCOSE SERPL-MCNC: 81 MG/DL
HBA1C MFR BLD HPLC: 5.5 %
HDLC SERPL-MCNC: 84 MG/DL
KETONES URINE: NEGATIVE MG/DL
LDLC SERPL CALC-MCNC: 105 MG/DL
LEUKOCYTE ESTERASE URINE: ABNORMAL
MICROSCOPIC-UA: NORMAL
NITRITE URINE: NEGATIVE
NONHDLC SERPL-MCNC: 119 MG/DL
PH URINE: 6.5
POTASSIUM SERPL-SCNC: 4.4 MMOL/L
PROT SERPL-MCNC: 7 G/DL
PROTEIN URINE: 30 MG/DL
RED BLOOD CELLS URINE: 1 /HPF
SODIUM SERPL-SCNC: 143 MMOL/L
SPECIFIC GRAVITY URINE: 1.02
TRIGL SERPL-MCNC: 73 MG/DL
TSH SERPL-ACNC: 2.28 UIU/ML
UROBILINOGEN URINE: 0.2 MG/DL
WHITE BLOOD CELLS URINE: 30 /HPF

## 2023-07-22 RX ORDER — NYSTATIN AND TRIAMCINOLONE ACETONIDE 100000; 1 MG/G; MG/G
100000-0.1 CREAM TOPICAL 3 TIMES DAILY
Qty: 1 | Refills: 5 | Status: ACTIVE | COMMUNITY
Start: 2023-06-23 | End: 1900-01-01

## 2025-04-09 ENCOUNTER — TRANSCRIPTION ENCOUNTER (OUTPATIENT)
Age: 75
End: 2025-04-09

## 2025-04-09 ENCOUNTER — INPATIENT (INPATIENT)
Facility: HOSPITAL | Age: 75
LOS: 8 days | Discharge: NURSING, REHAB OR LONG-TERM CARE | DRG: 66 | End: 2025-04-18
Attending: STUDENT IN AN ORGANIZED HEALTH CARE EDUCATION/TRAINING PROGRAM | Admitting: GENERAL ACUTE CARE HOSPITAL
Payer: MEDICARE

## 2025-04-09 ENCOUNTER — EMERGENCY (EMERGENCY)
Facility: HOSPITAL | Age: 75
LOS: 1 days | End: 2025-04-09
Attending: EMERGENCY MEDICINE | Admitting: EMERGENCY MEDICINE
Payer: MEDICARE

## 2025-04-09 VITALS
TEMPERATURE: 97 F | HEIGHT: 60 IN | RESPIRATION RATE: 16 BRPM | HEART RATE: 84 BPM | SYSTOLIC BLOOD PRESSURE: 197 MMHG | WEIGHT: 130.07 LBS | DIASTOLIC BLOOD PRESSURE: 113 MMHG | OXYGEN SATURATION: 98 %

## 2025-04-09 VITALS
DIASTOLIC BLOOD PRESSURE: 91 MMHG | HEART RATE: 66 BPM | OXYGEN SATURATION: 100 % | SYSTOLIC BLOOD PRESSURE: 164 MMHG | RESPIRATION RATE: 18 BRPM

## 2025-04-09 VITALS
DIASTOLIC BLOOD PRESSURE: 68 MMHG | SYSTOLIC BLOOD PRESSURE: 118 MMHG | OXYGEN SATURATION: 100 % | HEIGHT: 60 IN | HEART RATE: 68 BPM | RESPIRATION RATE: 15 BRPM

## 2025-04-09 DIAGNOSIS — Z98.890 OTHER SPECIFIED POSTPROCEDURAL STATES: Chronic | ICD-10-CM

## 2025-04-09 DIAGNOSIS — I62.9 NONTRAUMATIC INTRACRANIAL HEMORRHAGE, UNSPECIFIED: ICD-10-CM

## 2025-04-09 LAB
A1C WITH ESTIMATED AVERAGE GLUCOSE RESULT: 5.4 % — SIGNIFICANT CHANGE UP (ref 4–5.6)
ALBUMIN SERPL ELPH-MCNC: 4.3 G/DL — SIGNIFICANT CHANGE UP (ref 3.3–5.2)
ALBUMIN SERPL ELPH-MCNC: 4.4 G/DL — SIGNIFICANT CHANGE UP (ref 3.3–5)
ALP SERPL-CCNC: 83 U/L — SIGNIFICANT CHANGE UP (ref 40–120)
ALP SERPL-CCNC: 93 U/L — SIGNIFICANT CHANGE UP (ref 40–120)
ALT FLD-CCNC: 13 U/L — SIGNIFICANT CHANGE UP
ALT FLD-CCNC: 18 U/L — SIGNIFICANT CHANGE UP (ref 10–45)
ANION GAP SERPL CALC-SCNC: 16 MMOL/L — SIGNIFICANT CHANGE UP (ref 5–17)
ANION GAP SERPL CALC-SCNC: 18 MMOL/L — HIGH (ref 5–17)
APTT BLD: 49 SEC — HIGH (ref 24.5–35.6)
APTT BLD: 52.5 SEC — HIGH (ref 24.5–35.6)
AST SERPL-CCNC: 22 U/L — SIGNIFICANT CHANGE UP (ref 10–40)
AST SERPL-CCNC: 33 U/L — HIGH
BASOPHILS # BLD AUTO: 0.02 K/UL — SIGNIFICANT CHANGE UP (ref 0–0.2)
BASOPHILS # BLD AUTO: 0.03 K/UL — SIGNIFICANT CHANGE UP (ref 0–0.2)
BASOPHILS NFR BLD AUTO: 0.1 % — SIGNIFICANT CHANGE UP (ref 0–2)
BASOPHILS NFR BLD AUTO: 0.2 % — SIGNIFICANT CHANGE UP (ref 0–2)
BILIRUB SERPL-MCNC: 0.4 MG/DL — SIGNIFICANT CHANGE UP (ref 0.4–2)
BILIRUB SERPL-MCNC: 0.5 MG/DL — SIGNIFICANT CHANGE UP (ref 0.2–1.2)
BLD GP AB SCN SERPL QL: SIGNIFICANT CHANGE UP
BUN SERPL-MCNC: 12.9 MG/DL — SIGNIFICANT CHANGE UP (ref 8–20)
BUN SERPL-MCNC: 14 MG/DL — SIGNIFICANT CHANGE UP (ref 7–23)
CALCIUM SERPL-MCNC: 9.5 MG/DL — SIGNIFICANT CHANGE UP (ref 8.4–10.5)
CALCIUM SERPL-MCNC: 9.7 MG/DL — SIGNIFICANT CHANGE UP (ref 8.4–10.5)
CHLORIDE SERPL-SCNC: 94 MMOL/L — LOW (ref 96–108)
CHLORIDE SERPL-SCNC: 97 MMOL/L — SIGNIFICANT CHANGE UP (ref 96–108)
CHOLEST SERPL-MCNC: 211 MG/DL — HIGH
CO2 SERPL-SCNC: 21 MMOL/L — LOW (ref 22–29)
CO2 SERPL-SCNC: 23 MMOL/L — SIGNIFICANT CHANGE UP (ref 22–31)
CREAT SERPL-MCNC: 0.5 MG/DL — SIGNIFICANT CHANGE UP (ref 0.5–1.3)
CREAT SERPL-MCNC: 0.72 MG/DL — SIGNIFICANT CHANGE UP (ref 0.5–1.3)
EGFR: 87 ML/MIN/1.73M2 — SIGNIFICANT CHANGE UP
EGFR: 87 ML/MIN/1.73M2 — SIGNIFICANT CHANGE UP
EGFR: 98 ML/MIN/1.73M2 — SIGNIFICANT CHANGE UP
EGFR: 98 ML/MIN/1.73M2 — SIGNIFICANT CHANGE UP
EOSINOPHIL # BLD AUTO: 0 K/UL — SIGNIFICANT CHANGE UP (ref 0–0.5)
EOSINOPHIL # BLD AUTO: 0.01 K/UL — SIGNIFICANT CHANGE UP (ref 0–0.5)
EOSINOPHIL NFR BLD AUTO: 0 % — SIGNIFICANT CHANGE UP (ref 0–6)
EOSINOPHIL NFR BLD AUTO: 0.1 % — SIGNIFICANT CHANGE UP (ref 0–6)
ESTIMATED AVERAGE GLUCOSE: 108 MG/DL — SIGNIFICANT CHANGE UP (ref 68–114)
GLUCOSE SERPL-MCNC: 134 MG/DL — HIGH (ref 70–99)
GLUCOSE SERPL-MCNC: 161 MG/DL — HIGH (ref 70–99)
HCT VFR BLD CALC: 43.1 % — SIGNIFICANT CHANGE UP (ref 34.5–45)
HCT VFR BLD CALC: 44.3 % — SIGNIFICANT CHANGE UP (ref 34.5–45)
HDLC SERPL-MCNC: 95 MG/DL — SIGNIFICANT CHANGE UP
HGB BLD-MCNC: 14.5 G/DL — SIGNIFICANT CHANGE UP (ref 11.5–15.5)
HGB BLD-MCNC: 15.4 G/DL — SIGNIFICANT CHANGE UP (ref 11.5–15.5)
IMM GRANULOCYTES # BLD AUTO: 0.06 K/UL — SIGNIFICANT CHANGE UP (ref 0–0.07)
IMM GRANULOCYTES NFR BLD AUTO: 0.4 % — SIGNIFICANT CHANGE UP (ref 0–0.9)
IMM GRANULOCYTES NFR BLD AUTO: 0.4 % — SIGNIFICANT CHANGE UP (ref 0–0.9)
INR BLD: 1.1 RATIO — SIGNIFICANT CHANGE UP (ref 0.85–1.16)
INR BLD: 1.15 RATIO — SIGNIFICANT CHANGE UP (ref 0.85–1.16)
LDLC SERPL-MCNC: 107 MG/DL — HIGH
LIPID PNL WITH DIRECT LDL SERPL: 107 MG/DL — HIGH
LYMPHOCYTES # BLD AUTO: 0.65 K/UL — LOW (ref 1–3.3)
LYMPHOCYTES # BLD AUTO: 0.7 K/UL — LOW (ref 1–3.3)
LYMPHOCYTES # BLD AUTO: 5.1 % — LOW (ref 13–44)
LYMPHOCYTES NFR BLD AUTO: 4.7 % — LOW (ref 13–44)
MAGNESIUM SERPL-MCNC: 2 MG/DL — SIGNIFICANT CHANGE UP (ref 1.6–2.6)
MCHC RBC-ENTMCNC: 29.9 PG — SIGNIFICANT CHANGE UP (ref 27–34)
MCHC RBC-ENTMCNC: 30.6 PG — SIGNIFICANT CHANGE UP (ref 27–34)
MCHC RBC-ENTMCNC: 33.6 G/DL — SIGNIFICANT CHANGE UP (ref 32–36)
MCHC RBC-ENTMCNC: 34.8 G/DL — SIGNIFICANT CHANGE UP (ref 32–36)
MCV RBC AUTO: 88.1 FL — SIGNIFICANT CHANGE UP (ref 80–100)
MCV RBC AUTO: 88.9 FL — SIGNIFICANT CHANGE UP (ref 80–100)
MONOCYTES # BLD AUTO: 0.38 K/UL — SIGNIFICANT CHANGE UP (ref 0–0.9)
MONOCYTES # BLD AUTO: 0.77 K/UL — SIGNIFICANT CHANGE UP (ref 0–0.9)
MONOCYTES NFR BLD AUTO: 2.8 % — SIGNIFICANT CHANGE UP (ref 2–14)
MONOCYTES NFR BLD AUTO: 5.5 % — SIGNIFICANT CHANGE UP (ref 2–14)
NEUTROPHILS # BLD AUTO: 12.37 K/UL — HIGH (ref 1.8–7.4)
NEUTROPHILS # BLD AUTO: 12.54 K/UL — HIGH (ref 1.8–7.4)
NEUTROPHILS NFR BLD AUTO: 89.2 % — HIGH (ref 43–77)
NEUTROPHILS NFR BLD AUTO: 91.5 % — HIGH (ref 43–77)
NONHDLC SERPL-MCNC: 116 MG/DL — SIGNIFICANT CHANGE UP
NRBC # BLD AUTO: 0 K/UL — SIGNIFICANT CHANGE UP (ref 0–0)
NRBC # FLD: 0 K/UL — SIGNIFICANT CHANGE UP (ref 0–0)
NRBC BLD AUTO-RTO: 0 /100 WBCS — SIGNIFICANT CHANGE UP (ref 0–0)
NRBC BLD AUTO-RTO: 0 /100 WBCS — SIGNIFICANT CHANGE UP (ref 0–0)
PHOSPHATE SERPL-MCNC: 3.6 MG/DL — SIGNIFICANT CHANGE UP (ref 2.4–4.7)
PLATELET # BLD AUTO: 305 K/UL — SIGNIFICANT CHANGE UP (ref 150–400)
PLATELET # BLD AUTO: 317 K/UL — SIGNIFICANT CHANGE UP (ref 150–400)
PMV BLD: 10.6 FL — SIGNIFICANT CHANGE UP (ref 7–13)
POTASSIUM SERPL-MCNC: 3.8 MMOL/L — SIGNIFICANT CHANGE UP (ref 3.5–5.3)
POTASSIUM SERPL-MCNC: 5.5 MMOL/L — HIGH (ref 3.5–5.3)
POTASSIUM SERPL-SCNC: 3.8 MMOL/L — SIGNIFICANT CHANGE UP (ref 3.5–5.3)
POTASSIUM SERPL-SCNC: 5.5 MMOL/L — HIGH (ref 3.5–5.3)
PROT SERPL-MCNC: 7.5 G/DL — SIGNIFICANT CHANGE UP (ref 6.6–8.7)
PROT SERPL-MCNC: 8.2 G/DL — SIGNIFICANT CHANGE UP (ref 6–8.3)
PROTHROM AB SERPL-ACNC: 13 SEC — SIGNIFICANT CHANGE UP (ref 9.9–13.4)
PROTHROM AB SERPL-ACNC: 13.3 SEC — SIGNIFICANT CHANGE UP (ref 9.9–13.4)
RBC # BLD: 4.85 M/UL — SIGNIFICANT CHANGE UP (ref 3.8–5.2)
RBC # BLD: 5.03 M/UL — SIGNIFICANT CHANGE UP (ref 3.8–5.2)
RBC # FLD: 14.2 % — SIGNIFICANT CHANGE UP (ref 10.3–14.5)
RBC # FLD: 14.2 % — SIGNIFICANT CHANGE UP (ref 10.3–14.5)
SODIUM SERPL-SCNC: 133 MMOL/L — LOW (ref 135–145)
SODIUM SERPL-SCNC: 136 MMOL/L — SIGNIFICANT CHANGE UP (ref 135–145)
T3 SERPL-MCNC: 76 NG/DL — LOW (ref 80–200)
T4 AB SER-ACNC: 5.8 UG/DL — SIGNIFICANT CHANGE UP (ref 4.5–12)
TRIGL SERPL-MCNC: 51 MG/DL — SIGNIFICANT CHANGE UP
TROPONIN I, HIGH SENSITIVITY RESULT: 11 NG/L — SIGNIFICANT CHANGE UP
TROPONIN T, HIGH SENSITIVITY RESULT: 11 NG/L — SIGNIFICANT CHANGE UP (ref 0–51)
TSH SERPL-MCNC: 1.37 UIU/ML — SIGNIFICANT CHANGE UP (ref 0.27–4.2)
WBC # BLD: 13.71 K/UL — HIGH (ref 3.8–10.5)
WBC # BLD: 13.88 K/UL — HIGH (ref 3.8–10.5)
WBC # FLD AUTO: 13.71 K/UL — HIGH (ref 3.8–10.5)
WBC # FLD AUTO: 13.88 K/UL — HIGH (ref 3.8–10.5)

## 2025-04-09 PROCEDURE — 70498 CT ANGIOGRAPHY NECK: CPT | Mod: MC

## 2025-04-09 PROCEDURE — 71045 X-RAY EXAM CHEST 1 VIEW: CPT | Mod: 26

## 2025-04-09 PROCEDURE — 80061 LIPID PANEL: CPT

## 2025-04-09 PROCEDURE — 99291 CRITICAL CARE FIRST HOUR: CPT

## 2025-04-09 PROCEDURE — 86900 BLOOD TYPING SEROLOGIC ABO: CPT

## 2025-04-09 PROCEDURE — 36415 COLL VENOUS BLD VENIPUNCTURE: CPT

## 2025-04-09 PROCEDURE — 93005 ELECTROCARDIOGRAM TRACING: CPT

## 2025-04-09 PROCEDURE — 0042T: CPT

## 2025-04-09 PROCEDURE — 93010 ELECTROCARDIOGRAM REPORT: CPT

## 2025-04-09 PROCEDURE — 70450 CT HEAD/BRAIN W/O DYE: CPT | Mod: MC

## 2025-04-09 PROCEDURE — 86901 BLOOD TYPING SEROLOGIC RH(D): CPT

## 2025-04-09 PROCEDURE — 70496 CT ANGIOGRAPHY HEAD: CPT | Mod: MC

## 2025-04-09 PROCEDURE — 0042T: CPT | Mod: MC

## 2025-04-09 PROCEDURE — 85730 THROMBOPLASTIN TIME PARTIAL: CPT

## 2025-04-09 PROCEDURE — L9997: CPT

## 2025-04-09 PROCEDURE — 80053 COMPREHEN METABOLIC PANEL: CPT

## 2025-04-09 PROCEDURE — 84484 ASSAY OF TROPONIN QUANT: CPT

## 2025-04-09 PROCEDURE — 70498 CT ANGIOGRAPHY NECK: CPT | Mod: 26

## 2025-04-09 PROCEDURE — 70496 CT ANGIOGRAPHY HEAD: CPT | Mod: 26

## 2025-04-09 PROCEDURE — 70450 CT HEAD/BRAIN W/O DYE: CPT | Mod: 26,XU

## 2025-04-09 PROCEDURE — 96375 TX/PRO/DX INJ NEW DRUG ADDON: CPT

## 2025-04-09 PROCEDURE — 86850 RBC ANTIBODY SCREEN: CPT

## 2025-04-09 PROCEDURE — 99291 CRITICAL CARE FIRST HOUR: CPT | Mod: 25

## 2025-04-09 PROCEDURE — 96374 THER/PROPH/DIAG INJ IV PUSH: CPT | Mod: XU

## 2025-04-09 PROCEDURE — 85025 COMPLETE CBC W/AUTO DIFF WBC: CPT

## 2025-04-09 PROCEDURE — 85610 PROTHROMBIN TIME: CPT

## 2025-04-09 PROCEDURE — 71045 X-RAY EXAM CHEST 1 VIEW: CPT

## 2025-04-09 RX ORDER — ACETAMINOPHEN 500 MG/5ML
975 LIQUID (ML) ORAL EVERY 6 HOURS
Refills: 0 | Status: DISCONTINUED | OUTPATIENT
Start: 2025-04-09 | End: 2025-04-10

## 2025-04-09 RX ORDER — POLYETHYLENE GLYCOL 3350 17 G/17G
17 POWDER, FOR SOLUTION ORAL
Refills: 0 | Status: DISCONTINUED | OUTPATIENT
Start: 2025-04-09 | End: 2025-04-10

## 2025-04-09 RX ORDER — LABETALOL HYDROCHLORIDE 200 MG/1
20 TABLET, FILM COATED ORAL ONCE
Refills: 0 | Status: COMPLETED | OUTPATIENT
Start: 2025-04-09 | End: 2025-04-09

## 2025-04-09 RX ORDER — CYANOCOBALAMIN 1000 UG/ML
0 INJECTION INTRAMUSCULAR; SUBCUTANEOUS
Refills: 0 | DISCHARGE

## 2025-04-09 RX ORDER — NICARDIPINE HCL 30 MG
5 CAPSULE ORAL
Qty: 40 | Refills: 0 | Status: DISCONTINUED | OUTPATIENT
Start: 2025-04-09 | End: 2025-04-13

## 2025-04-09 RX ORDER — NICARDIPINE HCL 30 MG
5 CAPSULE ORAL
Qty: 40 | Refills: 0 | Status: DISCONTINUED | OUTPATIENT
Start: 2025-04-09 | End: 2025-04-10

## 2025-04-09 RX ORDER — LABETALOL HYDROCHLORIDE 200 MG/1
10 TABLET, FILM COATED ORAL
Refills: 0 | Status: DISCONTINUED | OUTPATIENT
Start: 2025-04-09 | End: 2025-04-15

## 2025-04-09 RX ORDER — SENNA 187 MG
2 TABLET ORAL AT BEDTIME
Refills: 0 | Status: DISCONTINUED | OUTPATIENT
Start: 2025-04-09 | End: 2025-04-10

## 2025-04-09 RX ADMIN — Medication 25 MG/HR: at 20:03

## 2025-04-09 RX ADMIN — LABETALOL HYDROCHLORIDE 20 MILLIGRAM(S): 200 TABLET, FILM COATED ORAL at 19:30

## 2025-04-09 RX ADMIN — LABETALOL HYDROCHLORIDE 20 MILLIGRAM(S): 200 TABLET, FILM COATED ORAL at 19:45

## 2025-04-09 NOTE — ED ADULT NURSE REASSESSMENT NOTE - NS ED NURSE REASSESS COMMENT FT1
cardene drip turned off on arrival as per bp, Dr. Zavala aware, will closely nmonitor, family at bedside, incontinence care provided to pateint.

## 2025-04-09 NOTE — ED ADULT NURSE NOTE - OBJECTIVE STATEMENT
Patient is a 73 y/o/f presents with sudden onset of gait disturbance, dizziness, nausea and vomiting at 1030am while going to use the bathroom to brush her teeth, woke up normal at 730am, pateint family arrived at her home to check up on her at 230, sent her to [place], noticed to have chloe bleed on CTH at [place] and transferred here for neurosurgery evaluation.  Patient Alert and oriented to person, place, and time, family at bedside, stopped bp meds several years ago after moderna vaccination, had orthostatic hypotension and dizziness, was on cardene on arrival, bp controlled and discontinued.

## 2025-04-09 NOTE — ED PROVIDER NOTE - OBJECTIVE STATEMENT
74 year old female presents as a transfer from [place] for ICH. Pt presented with visual changes and facial palsy., found to have ICH, given labetalol and started on cardene gtt. Pt c/o blurry vision, no headache or extremity weakness.

## 2025-04-09 NOTE — H&P ADULT - ENT GEN HX ROS MEA POS PC
Enodrs chronic Nasal Drip, Pt noted clearing throat  and multiple swallows with thin liquids/post-nasal discharge/dry mouth/dysphagia

## 2025-04-09 NOTE — CONSULT NOTE ADULT - ASSESSMENT
74F with PMH HTN (not on meds for 2 years) presented to [facility] with visual changes, R sided numbness, and dizziness. Pt states she woke up around 7am this morning and felt fine. Then at 10:30am, pt went to brush her teeth and suddenly felt dizzy and nauseous. Pt steadied herself and laid down in bed. Then had emesis x 1. Her son found her at 2:30pm and pt was experiencing mouth numbness and R sided numbness, thus EMS was called. Pt currently c/o dizziness, double vision, R hand and R foot numbness. Denies AC/AP. Denies falls/trauma, HA, or weakness. On cardene upon arrival, now off, SBP 120s. Neurosurgery consulted for CTH showing acute 2 x 1.5cm chleo IPH.  NIH: 3  ICH: 1  mRS: 0    Plan:  - Q1 neuro checks  - SBP <160  - All imaging reviewed: 4/9  CTH shows acute chloe hemorrhage, 2 x 1.5cm, without hydrocephalus or major mass effect. CTA H&N negative.  - Recommend Repeat 6hr CTH @1AM for stability  - STAT CTH for any changes in neuro exam  - Recommend stroke neurology consult   - Hold AC/AP  - DVT ppx: SCDs only  - Further medical management per NSICU  - Discussed with Dr. Vicente

## 2025-04-09 NOTE — DISCHARGE NOTE NURSING/CASE MANAGEMENT/SOCIAL WORK - PATIENT PORTAL LINK FT
You can access the FollowMyHealth Patient Portal offered by [facility] by registering at the following website: [email or web address]. By joining [facility]’s FollowMyHealth portal, you will also be able to view your health information using other applications (apps) compatible with our system.

## 2025-04-09 NOTE — ED ADULT NURSE NOTE - NSFALLRISKINTERV_ED_ALL_ED

## 2025-04-09 NOTE — H&P ADULT - HISTORY OF PRESENT ILLNESS
Patient is a 74-year-old female PMH Raynaud's HTN (off BP medication x 2 years) presents as a transfer from [facility] for ICH. Patient states that she woke up this morning around 7AM with no issues, then around 10:30AM she was brushing her teeth when she had a sudden onset of dizziness, nausea, episode of vomiting and laid down on her bed. Her son found her at 2:30PM laying down while she was still complaining of dizziness, and also was experiencing mouth numbness, right sided numbness and double vision. She was brought to [facility] via ambulance and on arrival she was hypertensive to 's requiring multiple pushes of IV medications and ultimately required a Cardene drip. 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 She was transferred to [facility] for neurosurgical evaluation. On arrival patient complaining of dizziness, right hand and foot decreased sensation, and double vision. Patient denies any AC/AP use and denies any recent trauma. Neurosurgery consulted and patient will be admitted to NSICU for close monitoring.    mRs: 0  ICH score: 1  NIHSS on Arrival: 3  1A: Level of consciousness       0= Alert; keenly responsive       +1= Arouses to minor stimulation       +2= Requires repeated stimulation to arouse       +2= Movements to pain       +3= Postures or unresponsive  1B: Ask month and age       0= Both questions right       +1= 1 question right       +2= 0 questions right       +1= Dysarthric/intubated/trauma/language barrier       +2= Aphasic  1C: "Blink eyes" and "Squeeze Hands"       0= Performs both       +1= Performs 1 task       +2= Performs 0 tasks  2: Horizontal EOMs       0= Normal       +1= Partial gaze palsy: can be overcome       +1= Partial gaze palsy: corrects w/ oculocephalic reflex        +2= Forced gaze palsy: cannot be overcome  3: Visual fields       0= No visual loss       +1= Partial hemianopia       +2= Complete hemianopia       +3= Patient is b/l blind       +3= B/l hemianopia  4: Facial palsy (use grimace if obtunded)       0= Normal symmetry       +1= Minor paralysis (flat NLF, smile asymmetry)       +2= Partial paralysis ( lower face)       +3= Unilateral complete paralysis (upper/lower face)       +3= B/l complete paralysis (upper/lower face)  5A: Left arm motor drift (count out loud and use fingers to show count)       0= No drift x 10 seconds       +1= Drift but doesn't hit bed       +2= Drift, hits bed       +2= Some effort against gravity       +3= No effort against gravity       +4= No movement       0= Amputation/joint fusion  5B: Right arm motor drift       0= No drift x 10 seconds       +1= Drift but doesn't hit bed       +2= Drift, hits bed       +2= Some effort against gravity       +3= No effort against gravity       +4= No movement       0= Amputation/joint fusion  6A: Left leg motor drift       0= No drift x 10 seconds       +1= Drift but doesn't hit bed       +2= Drift, hits bed       +2= Some effort against gravity       +3= No effort against gravity       +4= No movement       0= Amputation/joint fusion  6B: Right leg motor drift       0= No drift x 10 seconds       +1= Drift but doesn't hit bed       +2= Drift, hits bed       +2= Some effort against gravity       +3= No effort against gravity       +4= No movement       0= Amputation/joint fusion  7: Limb ataxia (FNF/heel-shin)       0= No ataxia       +1= Ataxia in 1 limb       +2= Ataxia in 2 limbs       0= Does not understand       0= Paralyzed       0= Amputation/joint fusion  8: Sensation       0= Normal, no sensory loss       +1= mild-moderate loss: less sharp/more dull       +1= mild-moderate loss: can sense being touched       +2= Complete loss: cannot sense being touched at all       +2= No response and quadriplegic       +2= Coma/unresponsive  9: Language/aphasia- describe the scene (on mirna); name the items; read the sentences (on mirna)       0= Normal, no aphasia       +1= mild-moderate aphaisa: some obvious changes without significant limitation       +2= Severe aphasia: fragmentary expression, inference needed, cannot identify materials       +3= Mute/global aphasia: no usable speech/auditory comprehension       +3= coma/unresponsive  10: Dysarthria- read the words       0= Normal       +1= mild-moderate dysarthria: slurring but can be understood       +2= Severe dysarthria: unintelligible slurring or out of proportion to dysphasia       +2= Mute/anarthric        0= Intubated/unable to test  11: Extinction/inattention       0= No abnormality       +1= visual/tactile/auditory/spatial/personal inattention       +1= Extinction to b/l simultaneous stimulation       +2= Profound brenda-inattention (e.g. does not recognize own hand)       +2= extinction to > 1 modality Patient is a 74-year-old female PMH Raynaud's HTN (off BP medication x 2 years) presents as a transfer from [facility] for ICH. Patient states that she woke up this morning around 7AM with no issues, then around 10:30AM she was brushing her teeth when she had a sudden onset of dizziness, nausea, episode of vomiting and laid down on her bed. Her son found her at 2:30PM laying down while she was still complaining of dizziness, and also was experiencing mouth numbness, right sided numbness and double vision. She was brought to [facility] via ambulance and on arrival she was hypertensive to 's requiring multiple pushes of IV medications and ultimately required a Cardene drip. 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 She was transferred to [facility] for neurosurgical evaluation. On arrival patient complaining of dizziness, right hand and foot decreased sensation, and double vision. Patient denies any AC/AP use and denies any recent trauma. Neurosurgery consulted and patient will be admitted to NSICU for close monitoring.    mRs: 0  ICH score: 1  NIHSS on Arrival: 3

## 2025-04-09 NOTE — H&P ADULT - NSHPPHYSICALEXAM_GEN_ALL_CORE
PHYSICAL EXAM:  GENERAL: NAD  HEAD: Atraumatic, normocephalic  RAJWINDER COMA SCORE: E-4 V-5 M-6 = 15  MENTAL STATUS: AAOx3; awake; opens eyes spontaneously; appropriately conversant without aphasia; following simple commands  CRANIAL NERVES: Visual fields full to confrontation, Pupils 3mm reactive bilaterally, +L eye CN6 palsy. Facial sensation intact V1-3 distribution b/l. Face symmetric w/ normal eye closure and smile, tongue midline. Hearing grossly intact. Speech clear.   MOTOR: Strength 5/5 b/l upper and lower extremities, no UE drift  SENSATION: +Decreased sensation of right hand and right foot   COORDINATION: No upper extremity dysmetria  CHEST/LUNG: Non-labored breathing on room-air  HEART: Regular rate and rhythm on monitor  SKIN: Warm, dry

## 2025-04-09 NOTE — H&P ADULT - NSHPSOCIALHISTORY_GEN_ALL_CORE
Patient lives alone, has 2 children one son and one daughter. Denies any smoking history or illicit drug use but admits to an occasional glass of wine socially

## 2025-04-09 NOTE — DISCHARGE NOTE NURSING/CASE MANAGEMENT/SOCIAL WORK - FINANCIAL ASSISTANCE
[facility] provides services at a reduced cost to those who are determined to be eligible through [facility]’s financial assistance program. Information regarding [facility]’s financial assistance program can be found by going to [email or web address] or by calling [phone].

## 2025-04-09 NOTE — CONSULT NOTE ADULT - SUBJECTIVE AND OBJECTIVE BOX
HPI:  74F with PMH HTN (not on meds for 2 years) presented to [facility] with visual changes, R sided numbness, and dizziness. Pt states she woke up around 7am this morning and felt fine. Then at 10:30am, pt went to brush her teeth and suddenly felt dizzy and nauseous. Pt steadied herself and laid down in bed. Then had emesis x 1. Her son found her at 2:30pm and pt was experiencing mouth numbness and R sided numbness, thus EMS was called. Pt currently c/o dizziness, double vision, R hand and R foot numbness. Denies AC/AP. Denies falls/trauma, HA, or weakness. On cardene upon arrival, now off, SBP 120s. Neurosurgery consulted for CTH showing acute 2 x 1.5cm chloe IPH.  NIH: 3  ICH: 1  mRS: 0    PAST MEDICAL & SURGICAL HISTORY:  HTN (hypertension)  Left lumpectomy in 1995    SOCIAL HISTORY:  Tobacco Use: denies  EtOH use: occasionally  Substance: denies    Allergies  No Known Allergies    REVIEW OF SYSTEMS  Negative except as noted in HPI    HOME MEDICATIONS:  Home Medications:  Vitamin B12  Vitamin D    MEDICATIONS:  OTHER:  niCARdipine Infusion 5 mG/Hr IV Continuous <Continuous>    Vital Signs Last 24 Hrs  T(C): 37.2 (09 Apr 2025 21:30), Max: 37.2 (09 Apr 2025 21:30)  T(F): 98.9 (09 Apr 2025 21:30), Max: 98.9 (09 Apr 2025 21:30)  HR: 72 (09 Apr 2025 21:30) (66 - 89)  BP: 138/76 (09 Apr 2025 21:30) (118/68 - 217/106)  BP(mean): 112 (09 Apr 2025 20:00) (110 - 137)  RR: 16 (09 Apr 2025 21:30) (12 - 20)  SpO2: 99% (09 Apr 2025 21:30) (95% - 100%)    Parameters below as of 09 Apr 2025 21:30  Patient On (Oxygen Delivery Method): room air    PHYSICAL EXAM:  GENERAL: NAD  HEAD: Atraumatic, normocephalic  RAJWINDER COMA SCORE: E-4 V-5 M-6 = 15  MENTAL STATUS: AAO x3; Awake; Opens eyes spontaneously; Appropriately conversant without aphasia; following commands  CRANIAL NERVES: +Diplopia, visual fields full to confrontation, PERRL. +L eye CN 6 palsy, does not cross midline. Facial sensation intact V1-3 distribution b/l. Face symmetric w/ normal eye closure and smile, tongue midline. Hearing grossly intact. Speech clear.  MOTOR: strength 5/5 b/l upper and lower extremities, no drift  SENSATION: subjective R hand and R foot numbness, otherwise grossly intact to light touch  COORDINATION: no upper or lower extremity dysmetria  CHEST/LUNG: nonlabored breathing  SKIN: Warm, dry    LABS:                        15.4   13.71 )-----------( 317      ( 09 Apr 2025 19:00 )             44.3     04-09    136  |  97  |  14  ----------------------------<  161[H]  3.8   |  23  |  0.72    Ca    9.7      09 Apr 2025 19:00    TPro  8.2  /  Alb  4.4  /  TBili  0.5  /  DBili  x   /  AST  22  /  ALT  18  /  AlkPhos  93  04-09    PT/INR - ( 09 Apr 2025 19:00 )   PT: 13.0 sec;   INR: 1.10 ratio       PTT - ( 09 Apr 2025 19:00 )  PTT:52.5 sec  Urinalysis Basic - ( 09 Apr 2025 19:00 )    Color: x / Appearance: x / SG: x / pH: x  Gluc: 161 mg/dL / Ketone: x  / Bili: x / Urobili: x   Blood: x / Protein: x / Nitrite: x   Leuk Esterase: x / RBC: x / WBC x   Sq Epi: x / Non Sq Epi: x / Bacteria: x    RADIOLOGY & ADDITIONAL STUDIES:  < from: CT Brain Perfusion Maps Stroke (04.09.25 @ 19:17) >  IMPRESSION:    CT HEAD:  Acute hemorrhage in the chloe, 2 x 1.5 cm, and located both centrally and   left dorsally at nuclei of CNs 6 and 7. Mild edema surrounds it, but   without hydrocephalus or major mass effect.    Finding above discussed with Dr. Martinez at 7:07 PM on 4/9/2025.    CT PERFUSION:  No deficit reported.    CTA INTRACRANIAL:  No arterial steno-occlusive disease or evidence of aneurysm. Source of   bleed not elucidated, and favored to be hypertensive.    CTA EXTRACRANIAL:  No arterial steno-occlusive disease or evidence of dissection.

## 2025-04-09 NOTE — H&P ADULT - ASSESSMENT
ASSESSMENT:  74-year-old female PMH Raynaud's HTN (off BP medication x 2 years) presents as a transfer from [facility] for pontine hemorrhage after sudden onset of nausea/vomiting, and dizziness around 10:30AM, she hypertensive on arrival to [facility] with 's. Denies any AC/AP use, denies any recent trauma. Patient will be admitted to NSICU for close monitoring. mRs: 0. ICH score: 1. NIHSS on Arrival: 3    PLAN:    Neuro:  - Q1h neuro checks, Q1 hour vitals  - HOB 30 degrees, neck midline position  - Repeat 6h CTH due 1:00AM  - Will need MRB  - Stroke neurology consult  - CTA Head negative  - PT/OT pending   	  CV:  - SBP goal   - EKG  - TTE  - PRN: Labetalol/Hydralazine    Pulm:  - Spo2 >92%    GI:  - NPO except meds  - Bowel regimen: senna, miralax  	  Gu:  - NS @ 50 while NPO  - Monitor electrolytes & renal function    Heme:  - Monitor H&H  - DVT ppx: SCDs  - Baseline LED pending  	  ID:  - Monitor WBC and temperature    Endo  - Monitor BGL, maintain <180    Discussed with Dr. Horton and Dr. Vicente      ASSESSMENT:  74-year-old female PMH Raynaud's HTN (off BP medication x 2 years) presents as a transfer from [facility] for pontine hemorrhage after sudden onset of nausea/vomiting, and dizziness around 10:30AM, she hypertensive on arrival to [facility] with 's. Denies any AC/AP use, denies any recent trauma. Patient will be admitted to NSICU for close monitoring. mRs: 0. ICH score: 1. NIHSS on Arrival: 3    PLAN:    Neuro:  - Q1h neuro checks, Q1 hour vitals  - Admission to NSICU  - HOB 30 degrees, neck midline position  - Repeat 6h CTH due 1:00AM  - Will need MRB +/-  - Stroke neurology consult  - CTA Head negative  - PT/OT pending   	  CV:  - SBP goal   - EKG  - TTE  - PRN: Labetalol/Hydralazine    Pulm:  - Spo2 >92%    GI:  - NPO except meds  - Bowel regimen: senna, miralax  	  Gu:  - NS @ 60 while NPO  - Monitor electrolytes & renal function    Heme:  - Monitor H&H  - DVT ppx: SCDs  - Baseline LED pending  	  ID:  - Monitor WBC and temperature    Endo  - Monitor BGL, maintain <180    Discussed with Dr. Horton and Dr. Vicente

## 2025-04-09 NOTE — H&P ADULT - NSHPLABSRESULTS_GEN_ALL_CORE
LABS:               15.4   13.71 )-----------( 317      ( 09 Apr 2025 19:00 )             44.3       136  |  97  |  14  ----------------------------<  161[H]  3.8   |  23  |  0.72    Ca    9.7      09 Apr 2025 19:00    TPro  8.2  /  Alb  4.4  /  TBili  0.5  /  DBili  x   /  AST  22  /  ALT  18  /  AlkPhos  93  04-09      RADIOLOGY:    CT Brain Perfusion/CT Head/CTA Head (04.09.25 @ 19:17)    IMPRESSION:    CT HEAD:    Acute hemorrhage in the chloe, 2 x 1.5 cm, and located both centrally and   left dorsally at nuclei of CNs 6 and 7. Mild edema surrounds it, but   without hydrocephalus or major mass effect.    CT PERFUSION:  No deficit reported.    CTA INTRACRANIAL:  No arterial steno-occlusive disease or evidence of aneurysm. Source of   bleed not elucidated, and favored to be hypertensive.    CTA EXTRACRANIAL:  No arterial steno-occlusive disease or evidence of dissection.

## 2025-04-09 NOTE — ED ADULT TRIAGE NOTE - SPO2 (%)
Last med check 4/3/17, advised to follow up in 6 months.  No future apt scheduled  Adderall listed historically, I do see it in the Rx hub dated 4/3/17  Please review and advise   100

## 2025-04-10 ENCOUNTER — TRANSCRIPTION ENCOUNTER (OUTPATIENT)
Age: 75
End: 2025-04-10

## 2025-04-10 LAB
ANION GAP SERPL CALC-SCNC: 13 MMOL/L — SIGNIFICANT CHANGE UP (ref 5–17)
ANION GAP SERPL CALC-SCNC: 15 MMOL/L — SIGNIFICANT CHANGE UP (ref 5–17)
BLD GP AB SCN SERPL QL: SIGNIFICANT CHANGE UP
BUN SERPL-MCNC: 10.9 MG/DL — SIGNIFICANT CHANGE UP (ref 8–20)
BUN SERPL-MCNC: 13.3 MG/DL — SIGNIFICANT CHANGE UP (ref 8–20)
CALCIUM SERPL-MCNC: 6.4 MG/DL — CRITICAL LOW (ref 8.4–10.5)
CALCIUM SERPL-MCNC: 9.2 MG/DL — SIGNIFICANT CHANGE UP (ref 8.4–10.5)
CHLORIDE SERPL-SCNC: 109 MMOL/L — HIGH (ref 96–108)
CHLORIDE SERPL-SCNC: 99 MMOL/L — SIGNIFICANT CHANGE UP (ref 96–108)
CHOLEST SERPL-MCNC: 239 MG/DL — HIGH
CK SERPL-CCNC: 114 U/L — SIGNIFICANT CHANGE UP (ref 25–170)
CO2 SERPL-SCNC: 18 MMOL/L — LOW (ref 22–29)
CO2 SERPL-SCNC: 22 MMOL/L — SIGNIFICANT CHANGE UP (ref 22–29)
CREAT SERPL-MCNC: 0.35 MG/DL — LOW (ref 0.5–1.3)
CREAT SERPL-MCNC: 0.55 MG/DL — SIGNIFICANT CHANGE UP (ref 0.5–1.3)
EGFR: 107 ML/MIN/1.73M2 — SIGNIFICANT CHANGE UP
EGFR: 107 ML/MIN/1.73M2 — SIGNIFICANT CHANGE UP
EGFR: 96 ML/MIN/1.73M2 — SIGNIFICANT CHANGE UP
EGFR: 96 ML/MIN/1.73M2 — SIGNIFICANT CHANGE UP
GLUCOSE SERPL-MCNC: 71 MG/DL — SIGNIFICANT CHANGE UP (ref 70–99)
GLUCOSE SERPL-MCNC: 90 MG/DL — SIGNIFICANT CHANGE UP (ref 70–99)
HCT VFR BLD CALC: 36.4 % — SIGNIFICANT CHANGE UP (ref 34.5–45)
HDLC SERPL-MCNC: 103 MG/DL — SIGNIFICANT CHANGE UP
HGB BLD-MCNC: 12.5 G/DL — SIGNIFICANT CHANGE UP (ref 11.5–15.5)
LDLC SERPL-MCNC: 123 MG/DL — HIGH
LIPID PNL WITH DIRECT LDL SERPL: 123 MG/DL — HIGH
MAGNESIUM SERPL-MCNC: 1.4 MG/DL — LOW (ref 1.6–2.6)
MAGNESIUM SERPL-MCNC: 2 MG/DL — SIGNIFICANT CHANGE UP (ref 1.6–2.6)
MCHC RBC-ENTMCNC: 30.5 PG — SIGNIFICANT CHANGE UP (ref 27–34)
MCHC RBC-ENTMCNC: 34.3 G/DL — SIGNIFICANT CHANGE UP (ref 32–36)
MCV RBC AUTO: 88.8 FL — SIGNIFICANT CHANGE UP (ref 80–100)
MRSA PCR RESULT.: SIGNIFICANT CHANGE UP
NONHDLC SERPL-MCNC: 135 MG/DL — HIGH
NRBC # BLD AUTO: 0 K/UL — SIGNIFICANT CHANGE UP (ref 0–0)
NRBC # FLD: 0 K/UL — SIGNIFICANT CHANGE UP (ref 0–0)
NRBC BLD AUTO-RTO: 0 /100 WBCS — SIGNIFICANT CHANGE UP (ref 0–0)
PHOSPHATE SERPL-MCNC: 2.6 MG/DL — SIGNIFICANT CHANGE UP (ref 2.4–4.7)
PHOSPHATE SERPL-MCNC: 3.6 MG/DL — SIGNIFICANT CHANGE UP (ref 2.4–4.7)
PLATELET # BLD AUTO: 256 K/UL — SIGNIFICANT CHANGE UP (ref 150–400)
PMV BLD: 10.3 FL — SIGNIFICANT CHANGE UP (ref 7–13)
POTASSIUM SERPL-MCNC: 2.4 MMOL/L — CRITICAL LOW (ref 3.5–5.3)
POTASSIUM SERPL-MCNC: 3.6 MMOL/L — SIGNIFICANT CHANGE UP (ref 3.5–5.3)
POTASSIUM SERPL-SCNC: 2.4 MMOL/L — CRITICAL LOW (ref 3.5–5.3)
POTASSIUM SERPL-SCNC: 3.6 MMOL/L — SIGNIFICANT CHANGE UP (ref 3.5–5.3)
RBC # BLD: 4.1 M/UL — SIGNIFICANT CHANGE UP (ref 3.8–5.2)
RBC # FLD: 14.5 % — SIGNIFICANT CHANGE UP (ref 10.3–14.5)
S AUREUS DNA NOSE QL NAA+PROBE: SIGNIFICANT CHANGE UP
SODIUM SERPL-SCNC: 136 MMOL/L — SIGNIFICANT CHANGE UP (ref 135–145)
SODIUM SERPL-SCNC: 140 MMOL/L — SIGNIFICANT CHANGE UP (ref 135–145)
T4 FREE SERPL-MCNC: 1.2 NG/DL — SIGNIFICANT CHANGE UP (ref 0.9–1.8)
TRIGL SERPL-MCNC: 72 MG/DL — SIGNIFICANT CHANGE UP
TROPONIN T, HIGH SENSITIVITY RESULT: 12 NG/L — SIGNIFICANT CHANGE UP (ref 0–51)
WBC # BLD: 9.8 K/UL — SIGNIFICANT CHANGE UP (ref 3.8–10.5)
WBC # FLD AUTO: 9.8 K/UL — SIGNIFICANT CHANGE UP (ref 3.8–10.5)

## 2025-04-10 PROCEDURE — 99222 1ST HOSP IP/OBS MODERATE 55: CPT

## 2025-04-10 PROCEDURE — 99232 SBSQ HOSP IP/OBS MODERATE 35: CPT

## 2025-04-10 PROCEDURE — 70450 CT HEAD/BRAIN W/O DYE: CPT | Mod: 26

## 2025-04-10 PROCEDURE — 70553 MRI BRAIN STEM W/O & W/DYE: CPT | Mod: 26

## 2025-04-10 PROCEDURE — 99233 SBSQ HOSP IP/OBS HIGH 50: CPT

## 2025-04-10 PROCEDURE — 93970 EXTREMITY STUDY: CPT | Mod: 26

## 2025-04-10 PROCEDURE — 93010 ELECTROCARDIOGRAM REPORT: CPT

## 2025-04-10 RX ORDER — INFLUENZA A VIRUS A/IDAHO/07/2018 (H1N1) ANTIGEN (MDCK CELL DERIVED, PROPIOLACTONE INACTIVATED, INFLUENZA A VIRUS A/INDIANA/08/2018 (H3N2) ANTIGEN (MDCK CELL DERIVED, PROPIOLACTONE INACTIVATED), INFLUENZA B VIRUS B/SINGAPORE/INFTT-16-0610/2016 ANTIGEN (MDCK CELL DERIVED, PROPIOLACTONE INACTIVATED), INFLUENZA B VIRUS B/IOWA/06/2017 ANTIGEN (MDCK CELL DERIVED, PROPIOLACTONE INACTIVATED) 15; 15; 15; 15 UG/.5ML; UG/.5ML; UG/.5ML; UG/.5ML
0.5 INJECTION, SUSPENSION INTRAMUSCULAR ONCE
Refills: 0 | Status: DISCONTINUED | OUTPATIENT
Start: 2025-04-10 | End: 2025-04-18

## 2025-04-10 RX ADMIN — Medication 10 MILLIGRAM(S): at 11:00

## 2025-04-10 NOTE — PATIENT PROFILE ADULT - FALL HARM RISK - RISK INTERVENTIONS

## 2025-04-10 NOTE — DISCHARGE NOTE PROVIDER - NSDCCPCAREPLAN_GEN_ALL_CORE_FT
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

## 2025-04-10 NOTE — PROGRESS NOTE ADULT - ASSESSMENT
74-year-old female PMH Raynaud's HTN (off BP medication x 2 years) presents as a transfer from [facility] for pontine hemorrhage after sudden onset of nausea/vomiting, and dizziness around 10:30AM, she hypertensive on arrival to [facility] with 's. Denies any AC/AP use, denies any recent trauma. Patient will be admitted to NSICU for close monitoring. mRs: 0. ICH score: 1. NIHSS on Arrival: 3    PLAN:  Neuro:  - Q1h neuro checks, Q1 hour vitals  - HOB 30 degrees, neck midline position  - Repeat 6h CTH due 1:00AM  - MRB w/wo pending  - Stroke neurology consult  - CTA Head negative  - PT/OT pending   	  CV:  - SBP goal   - EKG  - TTE  - PRN: Labetalol/Hydralazine    Pulm:  - Spo2 >92%    GI:  - NPO except meds  - Bowel regimen: senna, miralax  	  Gu:  - NS @ 60 while NPO  - Monitor electrolytes & renal function    Heme:  - Monitor H&H  - DVT ppx: SCDs  - Baseline LED pending  	  ID:  - Monitor WBC and temperature    Endo  - Monitor BGL, maintain <180    Discussed with Dr. Horton and Dr. Vicente

## 2025-04-10 NOTE — SPEECH LANGUAGE PATHOLOGY EVALUATION - SLP PERTINENT HISTORY OF CURRENT PROBLEM
74F with PMH Raynaud's and HTN presented to [facility] c/o visual changes, R sided numbness, and dizziness. Neurosurgery consulted for CTH with acute 2 x 1.5cm chloe IPH. Patient transferred to [facility] for Neurosurgical ICU admission and further management.

## 2025-04-10 NOTE — SPEECH LANGUAGE PATHOLOGY EVALUATION - SLP CONVERSATIONAL SPEECH
intact thought organization & sentence formulation, question trace word retrieval deficits in discourse however does not overall impact conversational exchange or communicative abilities

## 2025-04-10 NOTE — PHYSICAL THERAPY INITIAL EVALUATION ADULT - GENERAL OBSERVATIONS, REHAB EVAL
Pt received supine in bed + telemetry//BP + IV + Venous Compression Boots. Pt c/o 0/10 pain, Pt's son, daughter and son in law at bedside, pt agreeable to PT.

## 2025-04-10 NOTE — DISCHARGE NOTE PROVIDER - DETAILS OF MALNUTRITION DIAGNOSIS/DIAGNOSES
This patient has been assessed with a concern for Malnutrition and was treated during this hospitalization for the following Nutrition diagnosis/diagnoses:     -  04/16/2025: Moderate protein-calorie malnutrition   -  04/16/2025: Underweight (BMI < 19)

## 2025-04-10 NOTE — SWALLOW BEDSIDE ASSESSMENT ADULT - PHARYNGEAL PHASE
Decreased laryngeal elevation/Throat clear post oral intake/Delayed throat clear post oral intake/Multiple swallows

## 2025-04-10 NOTE — PHYSICAL THERAPY INITIAL EVALUATION ADULT - DIAGNOSIS, PT EVAL
functional debility 2*2 impaired balance, impaired midline orientation, and visual deficits s/p neuro event.

## 2025-04-10 NOTE — CONSULT NOTE ADULT - ASSESSMENT
74F with PMH Raynaud's and HTN presented to [facility] c/o visual changes, R sided numbness, and dizziness. Neurosurgery consulted for CTH with acute 2 x 1.5cm chloe IPH. Patient transferred to [facility] for Neurosurgical ICU admission and further management.    Intraparenchymal Hemorrhage  - CTH with acute 2 x 1.5cm chloe IPH, 04/09  - Repeat CTH, stable no change, 04/10  - Neuro Checks per neurosurgery  -  - 160  - Aspiration Precautions  - Seizure Precautions  - PT/OT consult  - Activity increased at tolerated  - c/w bowel regimen    Raynauds  - If low Spo2 pleth, consider using earlobe for monitoring  - Heat packs prn    HTN  - Nicardipine Weaned off  - Maintain SBPs 110-160  - c/w Hydralazine 10mg or Labetalol 10mg Q2H for SBP > 160  - TTE pending    Diet  - c/w NPO  - failed bedside dysphagia screen, to repeat 04/11  - c/w NS Maintenance Infusion    PPX  - SCD's    Disposition:  - consider downgrade to medicine once bp stabilizes 74F with PMH Raynaud's and HTN presented to [facility] c/o visual changes, R sided numbness, and dizziness. Neurosurgery consulted for CTH with acute 2 x 1.5cm chloe IPH. Patient transferred to [facility] for Neurosurgical ICU admission and further management.    Intraparenchymal Hemorrhage  - CTH with acute 2 x 1.5cm chloe IPH, 04/09  - Repeat CTH, stable no change, 04/10  - Neuro Checks per neurosurgery  - SBP per NeuroSx  - Aspiration Precautions  - Seizure Precautions  - PT/OT consult  - Activity increased at tolerated  - c/w bowel regimen    Raynauds  - If low Spo2 pleth, consider using earlobe for monitoring  - Heat packs prn    HTN  - Nicardipine Weaned off  - Maintain SBPs 110-160  - c/w Hydralazine 10mg or Labetalol 10mg Q2H for SBP > 160  - TTE pending    Dysphagia  - c/w NPO  - failed bedside dysphagia screen, to repeat 04/11  - c/w NS Maintenance Infusion and monitor electrolytes daily    PPX  - SCD's    Disposition: Patient remains acute, no plans for dc.

## 2025-04-10 NOTE — PROGRESS NOTE ADULT - SUBJECTIVE AND OBJECTIVE BOX
HPI:  Patient is a 74-year-old female PMH Raynaud's HTN (off BP medication x 2 years) presents as a transfer from [facility] for ICH. Patient states that she woke up this morning around 7AM with no issues, then around 10:30AM she was brushing her teeth when she had a sudden onset of dizziness, nausea, episode of vomiting and laid down on her bed. Her son found her at 2:30PM laying down while she was still complaining of dizziness, and also was experiencing mouth numbness, right sided numbness and double vision. She was brought to [facility] via ambulance and on arrival she was hypertensive to 's requiring multiple pushes of IV medications and ultimately required a Cardene drip. 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 She was transferred to [facility] for neurosurgical evaluation. On arrival patient complaining of dizziness, right hand and foot decreased sensation, and double vision. Patient denies any AC/AP use and denies any recent trauma. Neurosurgery consulted and patient will be admitted to NSICU for close monitoring.  mRs: 0  ICH score: 1  NIHSS on Arrival: 3    INTERVAL HPI/OVERNIGHT EVENTS:  74F seen lying comfortably in bed. NPO. No acute events overnight. Pending Repeat 6hr CTH and MRI Brain w/wo. Denies headache, weakness, n/v.    Vital Signs Last 24 Hrs  T(C): 36.7 (09 Apr 2025 23:00), Max: 37.2 (09 Apr 2025 21:30)  T(F): 98.1 (09 Apr 2025 23:00), Max: 98.9 (09 Apr 2025 21:30)  HR: 70 (09 Apr 2025 23:00) (66 - 89)  BP: 133/77 (09 Apr 2025 23:00) (118/68 - 217/106)  BP(mean): 95 (09 Apr 2025 23:00) (95 - 137)  RR: 18 (09 Apr 2025 23:00) (12 - 20)  SpO2: 95% (09 Apr 2025 23:00) (95% - 100%)    Parameters below as of 09 Apr 2025 23:00  Patient On (Oxygen Delivery Method): room air    PHYSICAL EXAM:  GENERAL: NAD  HEAD: Atraumatic, normocephalic  RAJWINDER COMA SCORE: E-4 V-5 M-6 = 15  MENTAL STATUS: AAOx3; awake; opens eyes spontaneously; appropriately conversant without aphasia; following simple commands  CRANIAL NERVES: Visual fields full to confrontation, Pupils 3mm reactive bilaterally, +L eye CN6 palsy. Facial sensation intact V1-3 distribution b/l. Face symmetric w/ normal eye closure and smile, tongue midline. Hearing grossly intact. Speech clear.   MOTOR: Strength 5/5 b/l upper and lower extremities, no UE drift  SENSATION: +Decreased sensation of right hand and right foot   COORDINATION: No upper extremity dysmetria  CHEST/LUNG: Non-labored breathing on room-air  HEART: Regular rate and rhythm on monitor  SKIN: Warm, dry    LABS:                        14.5   13.88 )-----------( 305      ( 09 Apr 2025 22:30 )             43.1     04-09    133[L]  |  94[L]  |  12.9  ----------------------------<  134[H]  5.5[H]   |  21.0[L]  |  0.50    Ca    9.5      09 Apr 2025 22:30  Phos  3.6     04-09  Mg     2.0     04-09    TPro  7.5  /  Alb  4.3  /  TBili  0.4  /  DBili  x   /  AST  33[H]  /  ALT  13  /  AlkPhos  83  04-09    PT/INR - ( 09 Apr 2025 22:30 )   PT: 13.3 sec;   INR: 1.15 ratio      PTT - ( 09 Apr 2025 22:30 )  PTT:49.0 sec  Urinalysis Basic - ( 09 Apr 2025 22:30 )    Color: x / Appearance: x / SG: x / pH: x  Gluc: 134 mg/dL / Ketone: x  / Bili: x / Urobili: x   Blood: x / Protein: x / Nitrite: x   Leuk Esterase: x / RBC: x / WBC x   Sq Epi: x / Non Sq Epi: x / Bacteria: x    04-09 @ 07:01  -  04-10 @ 00:09  --------------------------------------------------------  IN: 120 mL / OUT: 0 mL / NET: 120 mL    RADIOLOGY & ADDITIONAL TESTS:  < from: CT Brain Stroke Protocol (04.09.25 @ 19:03) >  IMPRESSION:    CT HEAD:  Acute hemorrhage in the chloe, 2 x 1.5 cm, and located both centrally and   left dorsally at nuclei of CNs 6 and 7. Mild edema surrounds it, but   without hydrocephalus or major mass effect.    Finding above discussed with Dr. Martinez at 7:07 PM on 4/9/2025.    CT PERFUSION:  No deficit reported.    CTA INTRACRANIAL:  No arterial steno-occlusive disease or evidence of aneurysm. Source of   bleed not elucidated, and favored to be hypertensive.    CTA EXTRACRANIAL:  No arterial steno-occlusive disease or evidence of dissection.

## 2025-04-10 NOTE — PHYSICAL THERAPY INITIAL EVALUATION ADULT - PERTINENT HX OF CURRENT PROBLEM, REHAB EVAL
73 y/o female with PMH of Raynaud's and HTN (off BP medication x 2 years) presents as a transfer from [facility]. Pt presented to [facility] on arrival with 's, c/o complaining of dizziness, right hand and foot decreased sensation to right extremities, and double vision to left eye. CT Head showed acute pontine hemorrhage. Patient was transferred to [facility] for neurosurgical evaluation. Pt evaluated in NSICU.

## 2025-04-10 NOTE — DISCHARGE NOTE PROVIDER - NSDCMRMEDTOKEN_GEN_ALL_CORE_FT
cholecalciferol 25 mcg (1000 intl units) oral capsule: 1 cap(s) orally once a day  cyanocobalamin: 1,000 microgram(s) orally once a day   acetaminophen 325 mg oral tablet: 2 tab(s) orally every 6 hours As needed Temp greater or equal to 38C (100.4F), Mild Pain (1 - 3)  cholecalciferol 25 mcg (1000 intl units) oral capsule: 1 cap(s) orally once a day  cyanocobalamin: 1,000 microgram(s) orally once a day  enoxaparin: 30 milligram(s) subcutaneous every 24 hours  hydroCHLOROthiazide 25 mg oral tablet: 1 tab(s) orally once a day  lisinopril 20 mg oral tablet: 1 tab(s) orally 2 times a day  polyethylene glycol 3350 oral powder for reconstitution: 17 gram(s) orally once a day  tamsulosin 0.4 mg oral capsule: 1 cap(s) orally once a day (at bedtime)

## 2025-04-10 NOTE — SPEECH LANGUAGE PATHOLOGY EVALUATION - SLP ABLE TO FOLLOW COMMANDS
Detail Level: Detailed Detail Level: Zone Detail Level: Generalized Detail Level: Simple within functional limits

## 2025-04-10 NOTE — CHART NOTE - NSCHARTNOTEFT_GEN_A_CORE
NSCU Transfer Note    Transfer from: NSCU    Transfer to: Neurosurgery Step Down    Accepting Physician: Dr. Farmer    Signout given to: Neurosurgery ACP team    HPI / NSCU COURSE:  HPI: Patient is a 74-year-old female PMH Raynaud's HTN (off BP medication x 2 years) [facility]. Patient states that she woke up this morning around 7AM with no issues, then around 10:30AM she was brushing her teeth when she had a sudden onset of dizziness, nausea, episode of vomiting and laid down on her bed. Her son found her at 2:30PM laying down while she was still complaining of dizziness, and also was experiencing mouth numbness, right sided numbness and double vision. She was brought to [facility] via ambulance and on arrival she was hypertensive to 's requiring multiple pushes of IV medications and ultimately required a Cardene drip. 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 She was transferred to [facility] for neurosurgical evaluation. On arrival patient complaining of dizziness, right hand and foot decreased sensation, and double vision. Patient denies any AC/AP use and denies any recent trauma. Neurosurgery consulted and patient will be admitted to NSICU for close monitoring.    Interval Events: Patient seen and examined by ICU team lying comfortably in bed. Patient continuing to report some double vision. Denies any dizziness, nausea, vomiting, headaches, chest pain, or shortness of breath at this time. Lower extremity dopplers performed today were negative. MR brain performed this morning demonstrating "2 cm hemorrhage within the mid to posterior chloe with punctate hemorrhage centrally which may reflect a focal cavernoma and mild chronic periventricular white matter ischemia."         Vital Signs Last 24 Hrs  T(C): 36.7 (10 Apr 2025 08:17), Max: 37.2 (09 Apr 2025 21:30)  T(F): 98 (10 Apr 2025 08:17), Max: 98.9 (09 Apr 2025 21:30)  HR: 71 (10 Apr 2025 14:00) (61 - 89)  BP: 129/72 (10 Apr 2025 14:00) (111/55 - 217/106)  BP(mean): 89 (10 Apr 2025 14:00) (89 - 144)  RR: 15 (10 Apr 2025 14:00) (12 - 20)  SpO2: 94% (10 Apr 2025 14:00) (94% - 100%)    Parameters below as of 10 Apr 2025 12:00  Patient On (Oxygen Delivery Method): room air        I&O's Summary    09 Apr 2025 07:01  -  10 Apr 2025 07:00  --------------------------------------------------------  IN: 540 mL / OUT: 0 mL / NET: 540 mL    10 Apr 2025 07:01  -  10 Apr 2025 14:16  --------------------------------------------------------  IN: 460 mL / OUT: 0 mL / NET: 460 mL        PHYSICAL EXAM:  GENERAL: NAD, well-groomed  HEAD:  Atraumatic, normocephalic  EYES: Conjunctiva and sclera clear  RAJWINDER COMA SCORE: E-4 V-5 M-6 =15  MENTAL STATUS: AAO x3; Awake; Opens eyes spontaneously; Appropriately conversant without aphasia; following simple commands  CRANIAL NERVES: Lateral gaze double vision. Left eye CN 6 palsy with lateral disconjugate gaze. PERRL. No nystagmus noted. Facial sensation intact V1-3 distribution b/l. Face symmetric w/ normal eye closure and smile, tongue midline. Hearing grossly intact. Speech clear. Head turning and shoulder shrug intact.   MOTOR: strength 5/5 b/l upper and lower extremities. No upper or lower extremity drift appreciated  SENSATION: Subjective tingling of the right upper and lower extremity, however grossly intact to light touch all extremities  CHEST/LUNG: No labored breathing or accessory muscle use  HEART: Sinus rhythm on monitor  ABDOMEN: Soft, nontender, nondistended  SKIN: Warm, dry      LABS:                         12.5   9.80  )-----------( 256      ( 10 Apr 2025 07:30 )             36.4       04-10    136  |  99  |  13.3  ----------------------------<  90  3.6   |  22.0  |  0.55    Ca    9.2      10 Apr 2025 09:00  Phos  3.6     04-10  Mg     2.0     04-10    TPro  7.5  /  Alb  4.3  /  TBili  0.4  /  DBili  x   /  AST  33[H]  /  ALT  13  /  AlkPhos  83  04-09      PT/INR - ( 09 Apr 2025 22:30 )   PT: 13.3 sec;   INR: 1.15 ratio         PTT - ( 09 Apr 2025 22:30 )  PTT:49.0 sec        Imaging:  MR Brain 4/10/25  IMPRESSION: 2 cm hemorrhage within the mid to posterior chloe with punctate hemorrhage centrally which may reflect a focal cavernoma. Mild chronic periventricular white matter ischemia.    CT Head 4/10/25  IMPRESSION: No change in dorsal left pontine hemorrhage with slight mass effect on the quadrigeminal plate cistern since 4/9/2025.          ASSESSMENT & PLAN:    Assessment: 74-year-old female PMH Raynaud's HTN (off BP medication x 2 years) presents as a transfer from [facility] system for ICH. Patient admitted to NSICU for further management. Stable neurological exam. MRI brain demonstrating possible focal cavernoma.    Plan- per Dr. Horton    Neuro   - Downgrade to Neurosurgery Step down unit  - Q2 neuro checks and vitals  - HOB at 30 degrees, neck midline position  - Stroke neurology consult  - PRN Tylenol 975mg q6hr   - Stroke patient education    CV  - Systolic BP goal of   - PRN for HTN: Hydralazine 10mg q2hr, Labetalol 10mg q2hr  - Pending TTE  - 12 lead EKG done: LVH, T wave abnormality, prolonged QT  - Lower extremity dopplers negative      Resp  - Room air  - Aspiration precautions    GI  - Patient failed formal Speech and Swallow eval, cont NPO for now  - LBM: outpatient  - Bowel Regimen: Miralax l Senna      - NS @ 60mL/hr  - Strict Ins and Outs documentation    Endo  - A1c: 5.4    Heme  - DVT prophylaxis: SCDs  - Continue to hold SQL until cleared by Dr. Farmer    PT/OT  - Activity: increase as tolerated  - Fall risk protocol  - Appreciate PT and OT recommendations NSCU Transfer Note    Transfer from: NSCU    Transfer to: Neurosurgery Step Down    Accepting Physician: Dr. Farmer    Signout given to: Neurosurgery ACP team    HPI / NSCU COURSE:  HPI: Patient is a 74-year-old female PMH Raynaud's HTN (off BP medication x 2 years) presents as a transfer from [facility] for ICH. Patient states that she woke up this morning around 7AM with no issues, then around 10:30AM she was brushing her teeth when she had a sudden onset of dizziness, nausea, episode of vomiting and laid down on her bed. Her son found her at 2:30PM laying down while she was still complaining of dizziness, and also was experiencing mouth numbness, right sided numbness and double vision. She was brought to [facility] via ambulance and on arrival she was hypertensive to 's requiring multiple pushes of IV medications and ultimately required a Cardene drip. 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 She was transferred to [facility] for neurosurgical evaluation. On arrival patient complaining of dizziness, right hand and foot decreased sensation, and double vision. Patient denies any AC/AP use and denies any recent trauma. Neurosurgery consulted and patient will be admitted to NSICU for close monitoring.    Interval Events: Patient seen and examined by ICU team lying comfortably in bed. Patient continuing to report some double vision. Denies any dizziness, nausea, vomiting, headaches, chest pain, or shortness of breath at this time. Lower extremity dopplers performed today were negative. MR brain performed this morning demonstrating "2 cm hemorrhage within the mid to posterior chloe with punctate hemorrhage centrally which may reflect a focal cavernoma and mild chronic periventricular white matter ischemia."         Vital Signs Last 24 Hrs  T(C): 36.7 (10 Apr 2025 08:17), Max: 37.2 (09 Apr 2025 21:30)  T(F): 98 (10 Apr 2025 08:17), Max: 98.9 (09 Apr 2025 21:30)  HR: 71 (10 Apr 2025 14:00) (61 - 89)  BP: 129/72 (10 Apr 2025 14:00) (111/55 - 217/106)  BP(mean): 89 (10 Apr 2025 14:00) (89 - 144)  RR: 15 (10 Apr 2025 14:00) (12 - 20)  SpO2: 94% (10 Apr 2025 14:00) (94% - 100%)    Parameters below as of 10 Apr 2025 12:00  Patient On (Oxygen Delivery Method): room air        I&O's Summary    09 Apr 2025 07:01  -  10 Apr 2025 07:00  --------------------------------------------------------  IN: 540 mL / OUT: 0 mL / NET: 540 mL    10 Apr 2025 07:01  -  10 Apr 2025 14:16  --------------------------------------------------------  IN: 460 mL / OUT: 0 mL / NET: 460 mL        PHYSICAL EXAM:  GENERAL: NAD, well-groomed  HEAD:  Atraumatic, normocephalic  EYES: Conjunctiva and sclera clear  RAJWINDER COMA SCORE: E-4 V-5 M-6 =15  MENTAL STATUS: AAO x3; Awake; Opens eyes spontaneously; Appropriately conversant without aphasia; following simple commands  CRANIAL NERVES: Lateral gaze double vision. Left eye CN 6 palsy with lateral disconjugate gaze. PERRL. No nystagmus noted. Facial sensation intact V1-3 distribution b/l. Face symmetric w/ normal eye closure and smile, tongue midline. Hearing grossly intact. Speech clear. Head turning and shoulder shrug intact.   MOTOR: strength 5/5 b/l upper and lower extremities. No upper or lower extremity drift appreciated  SENSATION: Subjective tingling of the right upper and lower extremity, however grossly intact to light touch all extremities  CHEST/LUNG: No labored breathing or accessory muscle use  HEART: Sinus rhythm on monitor  ABDOMEN: Soft, nontender, nondistended  SKIN: Warm, dry      LABS:                         12.5   9.80  )-----------( 256      ( 10 Apr 2025 07:30 )             36.4       04-10    136  |  99  |  13.3  ----------------------------<  90  3.6   |  22.0  |  0.55    Ca    9.2      10 Apr 2025 09:00  Phos  3.6     04-10  Mg     2.0     04-10    TPro  7.5  /  Alb  4.3  /  TBili  0.4  /  DBili  x   /  AST  33[H]  /  ALT  13  /  AlkPhos  83  04-09      PT/INR - ( 09 Apr 2025 22:30 )   PT: 13.3 sec;   INR: 1.15 ratio         PTT - ( 09 Apr 2025 22:30 )  PTT:49.0 sec        Imaging:  MR Brain 4/10/25  IMPRESSION: 2 cm hemorrhage within the mid to posterior chloe with punctate hemorrhage centrally which may reflect a focal cavernoma. Mild chronic periventricular white matter ischemia.    CT Head 4/10/25  IMPRESSION: No change in dorsal left pontine hemorrhage with slight mass effect on the quadrigeminal plate cistern since 4/9/2025.          ASSESSMENT & PLAN:    Assessment: 74-year-old female PMH Raynaud's HTN (off BP medication x 2 years) presents as a transfer from [facility] for ICH. Patient admitted to NSICU for further management. Stable neurological exam. MRI brain demonstrating possible focal cavernoma.    Plan- per Dr. Horton    Neuro   - Downgrade to Neurosurgery Step down unit  - Q2 neuro checks and vitals  - HOB at 30 degrees, neck midline position  - Stroke neurology consult  - PRN Tylenol 975mg q6hr   - Stroke patient education    CV  - Systolic BP goal of   - PRN for HTN: Hydralazine 10mg q2hr, Labetalol 10mg q2hr  - Pending TTE  - 12 lead EKG done: LVH, T wave abnormality, prolonged QT  - Lower extremity dopplers negative      Resp  - Room air  - Aspiration precautions    GI  - Patient failed formal Speech and Swallow eval, cont NPO for now  - LBM: outpatient  - Bowel Regimen: Miralax l Senna      - NS @ 60mL/hr  - Strict Ins and Outs documentation    Endo  - A1c: 5.4    Heme  - DVT prophylaxis: SCDs  - Continue to hold SQL until cleared by Dr. 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  PMH Raynaud's, HTN (off BP medication now), HLD.  Dysphagia.  Remains clinically and radiographically stable.    Plan:  - neurochecks, cont; fall precautions  - STAT head CT for any neurologic worsening  - statin - consider initiation as an outpt  - maintain -160, avoid hypotension or significant fluctuations; TTE  - maintain Osats>92%, incentive spirometry  - NPO for now, S/S re-eval in am  - monitor e-lytes, UOP; IV fluids for now  - maintain -180, ISS  - VTE ppx: SCDs, hold anti-thrombotics for now, re-eval in am  - stable to be transferred to SDU    Time spent - 50 min, non-critical, included review of relevant history, serial clinical examinations, review of data and images, discussion of treatment with the multidisciplinary team and any consultants involved in this patient’s care as well as family discussion.

## 2025-04-10 NOTE — PATIENT PROFILE ADULT - NSPROPTRIGHTSUPPORTPERSON_GEN_A_NUR
Patient transported to [address] [place] [address], [address] [place] [address]  07/14/22 2026 declines

## 2025-04-10 NOTE — DISCHARGE NOTE PROVIDER - NSDCCPTREATMENT_GEN_ALL_CORE_FT
PRINCIPAL PROCEDURE  Procedure: Brain MRI  Findings and Treatment: IMPRESSION:  2 cm hemorrhage within the mid to posterior chloe with punctate hemorrhage centrally which may reflect a focal cavernoma.  Mild   chronic periventricular white matter ischemia.

## 2025-04-10 NOTE — DISCHARGE NOTE PROVIDER - HOSPITAL COURSE
73 y/o male with PMHx of HTN, not controlled on medications presented to [facility] for visual changes, right sided numbness and dizziness on 4/9/2025. Pt was transferred to [facility] on 4/9/2025 fir neurosurgical evaluation. Patient was observed and treated medically in the Neuro ICU. Pt downgraded to floor when appropriate. Neurosurgery, physical therapy, occupational therapy, speech pathologist followed the patient's course. On DATE, pt deemed medically and surgically stable for discharge. Discharged with home medications, incentive spirometer and pain medications to follow-up with SURGEON in 10-14 days. Instructions, medications, and hospital course was discussed with patient and/or family prior to discharge. 75 y/o male with PMHx of HTN, not controlled on medications presented to [facility] for visual changes, right sided numbness and dizziness on 4/9/2025. Pt was transferred to [facility] on 4/9/2025 fir neurosurgical evaluation. Patient was observed and treated medically in the Neuro ICU. Pt downgraded to floor when appropriate. Neurosurgery, physical therapy, occupational therapy, speech pathologist followed the patient's course. Her course was complicated by HTN for which she was started on Lisinopril 20 mg BID.     On DATE, pt deemed medically and surgically stable for discharge. Discharged with home medications, incentive spirometer and pain medications to follow-up with SURGEON in 10-14 days. Instructions, medications, and hospital course was discussed with patient and/or family prior to discharge.

## 2025-04-10 NOTE — SPEECH LANGUAGE PATHOLOGY EVALUATION - SLP ANTICIPATED DISCHARGE DISPOSITION
Pt may benefit from intensive SLP intervention following d/c, pending cog assessment & further dysphagia outcome/rehabilitation facility

## 2025-04-10 NOTE — SWALLOW BEDSIDE ASSESSMENT ADULT - SWALLOW EVAL: DIAGNOSIS
Oral stage of swallow generally functional for puree & liquids. Cannot exclude pharyngeal dysphagia across all trials consumed, Pt with suspected reduced laryngeal elevation as per digital palpation, w/multiple effortful swallows x 4-5, w/variable immediate & delayed throat clearing noted throughout post intake, puree, thin, mild & moderately thick liquids. PO is currently contraindicated at this time.

## 2025-04-10 NOTE — SWALLOW BEDSIDE ASSESSMENT ADULT - SLP GENERAL OBSERVATIONS
Pt recd awake/upright in bed, A&A Ox3, slightly dysarthric, 0/10 pain pre/post, tolerating RA NAD SpO2 94% throughout, family at bedside

## 2025-04-10 NOTE — DISCHARGE NOTE PROVIDER - CARE PROVIDER_API CALL
Logan Farmer [facility]  Neurosurgery  [address] [place] [id number]  Phone: [phone]  Fax: [phone]  Follow Up Time: 1 week

## 2025-04-10 NOTE — PHYSICAL THERAPY INITIAL EVALUATION ADULT - FOLLOWS COMMANDS/ANSWERS QUESTIONS, REHAB EVAL
RN spoke with patient. She is wondering if Dr. Mitchell would be willing to manage her BP as she does not have a cardiologist. She reports her BP varies and this morning prior to taking her medication it was 150/94. Heart rates have been 60-70 bpm. She denies having any chest pain, palpitations, shortness of breath, dizziness, lightheadedness, or syncope.     She also is questioning if she is considered high risk regarding recent coronavirus outbreak with her history of atrial fibrillation and if there are specific precautions she needs to be taking.       Discussed with Dr. Mitchell - will add Losartan 12.5 mg daily with parameters to hold if BP <90/70 or patient has symptoms of dizziness/lightheadedness. Patient should also monitor BP every morning and evening and log readings. Patient should take standard precautions regarding recent coronavirus outbreak.    Patient notified of Dr. Mitchell's instructions. Patient verbalized understanding. Letter stating patient is cleared to work from EP standpoint while utilizing appropriate PPE precautions for coronavirus sent to patient's place of employment.  Patient will return call with fax number in which to send letter to.    100% of the time

## 2025-04-10 NOTE — DISCHARGE NOTE PROVIDER - NSDCACTIVITY_GEN_ALL_CORE
Walking - Indoors allowed/No heavy lifting/straining/Walking - Outdoors allowed/Follow Instructions Provided by your Surgical Team/Activity as tolerated

## 2025-04-10 NOTE — SWALLOW BEDSIDE ASSESSMENT ADULT - SLP PERTINENT HISTORY OF CURRENT PROBLEM
74F with PMH Raynaud's and HTN presented to [facility] c/o visual changes, R sided numbness, and dizziness. Neurosurgery consulted for CTH with acute 2 x 1.5cm chloe IPH. Patient transferred to [facility] for Neurosurgical ICU admission and further management.

## 2025-04-10 NOTE — PHYSICAL THERAPY INITIAL EVALUATION ADULT - SENSORY TESTS
(+) eye tracking bilaterally in all directions, tested unilaterally (+) nystagmus noted with superior gaze in both eyes; (+) acuity in all fields; (+) finger to thumb coordination BUEs intact Pt reports double vision and light sensitivity (+) eye tracking in all directions, tested unilaterally (+) nystagmus noted with superior gaze in both eyes; (+) acuity in all fields; (+) finger to thumb coordination BUEs intact

## 2025-04-10 NOTE — CONSULT NOTE ADULT - ATTENDING COMMENTS
74F with PMH Raynaud's and HTN presented to [facility] c/o visual changes, R sided numbness, and dizziness. Patient transferred to [facility] for Neurosurgical ICU admission and further management.  Discssued with NP Student Senthil and agree with above, Changes made to text.     PHYSICAL EXAM:  Constitutional: No acute distress, alert and oriented by 3  HEENT: AT/NC, EOMI, PERRLA, Normal conjunctiva, no pharyngeal erythema, moist oral mucosa  Respiratory: CTA BL, equal breath sounds, no crackles or wheezing  Cardiovascular: RRR, no edema  Gastrointestinal: soft, Non-tender, Non-distended + Bowel sounds, no rebound or guarding  Genitourinary: no holden  Musculoskeletal: No joint edema  Neurological:  CN6 palsy, normal strength in bl upper and lower extremities.  Skin: warm, dry and intact  Psychiatric: normal mood and affect    If unable to pass swallow eval will need to discuss alternae means of nutrition with patient and family.   Monitor electrolytes daily and cont IV hydration  consider eye patch for diplopia

## 2025-04-10 NOTE — DISCHARGE NOTE PROVIDER - NSDCFUADDINST_GEN_ALL_CORE_FT
Please make an appointment for follow up with neurosurgeon Dr. Logan Farmer's office in 6 weeks. Call [phone] to schedule an appointment. You will need a repeat MRI brain w/wo contrast before this appointment, the office will provide you with the order to have this done.    Be aware of any unusual symptoms, such as severe headache, weakness, numbness, speech difficulties, vision changes. If you experience any concerning symptoms, seek medical attention immediately.   You have been started on a new medication: lisinopril. You should continue to take this twice a day for your high blood pressure. Please make a follow up with your primary care doctor after discharge to go over your hospital course and review your new medications / get your blood pressure checked.    You have been started on a new medication: hydrochlorothiazide. You should continue to take this daily for your high blood pressure.

## 2025-04-10 NOTE — CONSULT NOTE ADULT - SUBJECTIVE AND OBJECTIVE BOX
PMD :  Cardio :     Patient is a 74y old  Female who presents with a chief complaint of Pontine hemorrhage (10 Apr 2025 00:09)      HPI:  Patient is a 74-year-old female PMH Raynaud's HTN (off BP medication x 2 years) presents as a transfer from [facility] for ICH. Patient states that she woke up this morning around 7AM with no issues, then around 10:30AM she was brushing her teeth when she had a sudden onset of dizziness, nausea, episode of vomiting and laid down on her bed. Her son found her at 2:30PM laying down while she was still complaining of dizziness, and also was experiencing mouth numbness, right sided numbness and double vision. She was brought to [facility] via ambulance and on arrival she was hypertensive to 's requiring multiple pushes of IV medications and ultimately required a Cardene drip. 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 She was transferred to [facility] for neurosurgical evaluation. On arrival patient complaining of dizziness, right hand and foot decreased sensation, and double vision. Patient denies any AC/AP use and denies any recent trauma. Neurosurgery consulted and patient will be admitted to NSICU for close monitoring.        PAST MEDICAL & SURGICAL HISTORY:  HTN (hypertension)  S/P lumpectomy, left breast        Social History:  Tabacco -   ETOH -   Illicit drug abuse - denies    FAMILY HISTORY:      Allergies    No Known Allergies    Intolerances        HOME MEDICATIONS :     REVIEW OF SYSTEMS:    CONSTITUTIONAL: No fever, weight loss, or fatigue  EYES: No eye pain, visual disturbances, or discharge  NECK: No pain or stiffness  RESPIRATORY: No cough, wheezing, chills or hemoptysis; No shortness of breath  CARDIOVASCULAR: No chest pain, palpitations, dizziness, or leg swelling  GASTROINTESTINAL: No abdominal or epigastric pain. No nausea, vomiting, or hematemesis; No diarrhea or constipation. No melena or hematochezia.  GENITOURINARY: No dysuria, frequency, hematuria, or incontinence  NEUROLOGICAL: No headaches, memory loss, loss of strength, numbness, or tremors  SKIN: No itching, burning, rashes, or lesions   LYMPH NODES: No enlarged glands  ENDOCRINE: No heat or cold intolerance; No hair loss  MUSCULOSKELETAL:   PSYCHIATRIC: No depression, anxiety, mood swings, or difficulty sleeping  HEME/LYMPH: No easy bruising, or bleeding gums  ALLERGY AND IMMUNOLOGIC: No hives or eczema    MEDICATIONS  (STANDING):  chlorhexidine 2% Cloths 1 Application(s) Topical daily  influenza  Vaccine (HIGH DOSE) 0.5 milliLiter(s) IntraMuscular once  polyethylene glycol 3350 17 Gram(s) Oral two times a day  senna 2 Tablet(s) Oral at bedtime  sodium chloride 0.9%. 1000 milliLiter(s) (60 mL/Hr) IV Continuous <Continuous>    MEDICATIONS  (PRN):  acetaminophen     Tablet .. 975 milliGRAM(s) Oral every 6 hours PRN Temp greater or equal to 38C (100.4F), Mild Pain (1 - 3)  hydrALAZINE Injectable 10 milliGRAM(s) IV Push every 2 hours PRN SBP> 160  labetalol Injectable 10 milliGRAM(s) IV Push every 2 hours PRN SBP> 160      Vital Signs Last 24 Hrs  T(C): 36.7 (10 Apr 2025 08:17), Max: 37.2 (09 Apr 2025 21:30)  T(F): 98 (10 Apr 2025 08:17), Max: 98.9 (09 Apr 2025 21:30)  HR: 72 (10 Apr 2025 11:23) (61 - 89)  BP: 139/77 (10 Apr 2025 11:23) (111/55 - 217/106)  BP(mean): 95 (10 Apr 2025 11:23) (89 - 144)  RR: 18 (10 Apr 2025 11:23) (12 - 20)  SpO2: 97% (10 Apr 2025 11:23) (95% - 100%)    Parameters below as of 10 Apr 2025 11:23  Patient On (Oxygen Delivery Method): room air        PHYSICAL EXAM:    GENERAL: NAD, well-groomed, well-developed  HEAD:  Atraumatic, Normocephalic  EYES: EOMI, PERRLA, conjunctiva and sclera clear  NECK: Supple, No JVD, Normal thyroid  NERVOUS SYSTEM:  Alert & Oriented X3, Good concentration; Motor Strength 5/5 B/L upper and lower extremities; DTRs 2+ intact and symmetric  CHEST/LUNG: CTA  b/l,  no rales, rhonchi, wheezing, or rubs  HEART: Regular rate and rhythm; No murmurs, rubs, or gallops  ABDOMEN: Soft, Nontender, Nondistended; Bowel sounds present  EXTREMITIES:  2+ Peripheral Pulses, No clubbing, cyanosis, or edema ,   LYMPH: No lymphadenopathy noted  SKIN: No rashes or lesions    LABS:                        12.5   9.80  )-----------( 256      ( 10 Apr 2025 07:30 )             36.4     04-10    136  |  99  |  13.3  ----------------------------<  90  3.6   |  22.0  |  0.55    Ca    9.2      10 Apr 2025 09:00  Phos  3.6     04-10  Mg     2.0     04-10    TPro  7.5  /  Alb  4.3  /  TBili  0.4  /  DBili  x   /  AST  33[H]  /  ALT  13  /  AlkPhos  83  04-09    PT/INR - ( 09 Apr 2025 22:30 )   PT: 13.3 sec;   INR: 1.15 ratio         PTT - ( 09 Apr 2025 22:30 )  PTT:49.0 sec  Urinalysis Basic - ( 10 Apr 2025 09:00 )    Color: x / Appearance: x / SG: x / pH: x  Gluc: 90 mg/dL / Ketone: x  / Bili: x / Urobili: x   Blood: x / Protein: x / Nitrite: x   Leuk Esterase: x / RBC: x / WBC x   Sq Epi: x / Non Sq Epi: x / Bacteria: x          RADIOLOGY & ADDITIONAL STUDIES:     PMD :  Cardio :     Patient is a 74y old  Female who presents with a chief complaint of Pontine hemorrhage (10 Apr 2025 00:09)      HPI:  Patient is a 75 y/o female with PMH of Raynaud's and HTN (off BP medication x 2 years) presents as a transfer from [facility] for pontine hemorrhage s/p nausea/vomiting, and dizziness while washing her mouth. At [facility] ED noted hypertensive on arrival with 's, requiring Cardene Drip. 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 Patient was transferred to [facility] for neurosurgical evaluation and c/o complaining of dizziness, right hand and foot decreased sensation to right extremeties, and double vision to left eye. Patient denies any AC/AP use and denies any recent trauma. Neurosurgery consulted and patient will be admitted to NSICU for close monitoring.      PAST MEDICAL & SURGICAL HISTORY:  HTN, Raynaud's  h/o Tubal Ligation ()  h/o lumpectomy, left breast ()    Social History:  Tobacco: Denies  ETOH: 2 - 3 "small" glasses of white wine 3 - 4 per week  Illicit drug abuse - Denies    FAMILY HISTORY:  - Father: HTN, Prostate CA.    - Mother: Breast CA (Dx )     Allergies:  - No Known Allergies    Intolerances:  - Denies    HOME MEDICATIONS :   - Cyanocobalamin 1,000 mg PO, Once Daily  - Cholecalciferol 1,000 IU mg PO, Once Daily       REVIEW OF SYSTEMS:    CONSTITUTIONAL: No fever, weight loss, or fatigue  EYES: No eye pain, or discharge. Endorses diplopia and blurry vision to left eye, with improvement if covering eye.  NECK: No pain or stiffness  RESPIRATORY: No cough, wheezing, chills or hemoptysis; No shortness of breath  CARDIOVASCULAR: No chest pain, palpitations, dizziness, or leg swelling  GASTROINTESTINAL: No abdominal or epigastric pain. No nausea, vomiting, or hematemesis; No diarrhea or constipation. No melena or hematochezia.  GENITOURINARY: No dysuria, frequency, hematuria, or incontinence  NEUROLOGICAL: No headaches, memory loss, loss of strength, numbness, or tremors. Endorses mild numbness to right hand and foot.  SKIN: No itching, burning, rashes, or lesions   LYMPH NODES: No enlarged glands  ENDOCRINE: No heat or cold intolerance; No hair loss  MUSCULOSKELETAL: No muscle pain, join pain or swelling. Endorses trouble walking  PSYCHIATRIC: No depression, anxiety, mood swings, or difficulty sleeping  HEME/LYMPH: No easy bruising, or bleeding gums  ALLERGY AND IMMUNOLOGIC: No hives or eczema    MEDICATIONS  (STANDING):  chlorhexidine 2% Cloths 1 Application(s) Topical daily  influenza  Vaccine (HIGH DOSE) 0.5 milliLiter(s) IntraMuscular once  polyethylene glycol 3350 17 Gram(s) Oral two times a day  senna 2 Tablet(s) Oral at bedtime  sodium chloride 0.9%. 1000 milliLiter(s) (60 mL/Hr) IV Continuous <Continuous>    MEDICATIONS  (PRN):  acetaminophen     Tablet .. 975 milliGRAM(s) Oral every 6 hours PRN Temp greater or equal to 38C (100.4F), Mild Pain (1 - 3)  hydrALAZINE Injectable 10 milliGRAM(s) IV Push every 2 hours PRN SBP> 160  labetalol Injectable 10 milliGRAM(s) IV Push every 2 hours PRN SBP> 160    Vital Signs Last 24 Hrs  T(C): 36.7 (10 Apr 2025 08:17), Max: 37.2 (2025 21:30)  T(F): 98 (10 Apr 2025 08:17), Max: 98.9 (2025 21:30)  HR: 72 (10 Apr 2025 11:23) (61 - 89)  BP: 139/77 (10 Apr 2025 11:23) (111/55 - 217/106)  BP(mean): 95 (10 Apr 2025 11:23) (89 - 144)  RR: 18 (10 Apr 2025 11:23) (12 - 20)  SpO2: 97% (10 Apr 2025 11:23) (95% - 100%)    Parameters below as of 10 Apr 2025 11:23  Patient On (Oxygen Delivery Method): room air      PHYSICAL EXAM:    GENERAL: NAD, well-groomed,  HEAD:  Atraumatic, Normocephalic  EYES: EOMI, PERRLA, conjunctiva and sclera clear, eyelids symmetrical  NECK: Supple, No JVD, Normal thyroid  NERVOUS SYSTEM:  Alert & Oriented X3, Good concentration; Facial sensation intact b/l. Face symmetric w/ normal eye closure andsmile, tongue midline. Speech clear.  CHEST/LUNG: CTA  b/l,  no rales, rhonchi, wheezing, or rubs  HEART: Regular rate and rhythm; No murmurs, rubs, or gallops  ABDOMEN: Soft, Nontender, Nondistended; Bowel sounds present  EXTREMITIES:  2+ Peripheral Pulses, No clubbing, cyanosis, or edema , strength 5/5 all extremeties   LYMPH: No lymphadenopathy noted  SKIN: No rashes or lesions    LABS:                        12.5   9.80  )-----------( 256      ( 10 Apr 2025 07:30 )             36.4     04-10    136  |  99  |  13.3  ----------------------------<  90  3.6   |  22.0  |  0.55    Ca    9.2      10 Apr 2025 09:00  Phos  3.6     04-10  Mg     2.0     04-10    TPro  7.5  /  Alb  4.3  /  TBili  0.4  /  DBili  x   /  AST  33[H]  /  ALT  13  /  AlkPhos  83  04-09    PT/INR - ( 2025 22:30 )   PT: 13.3 sec;   INR: 1.15 ratio      PTT - ( 2025 22:30 )  PTT:49.0 sec    Urinalysis Basic - ( 10 Apr 2025 09:00 )    Color: x / Appearance: x / SG: x / pH: x  Gluc: 90 mg/dL / Ketone: x  / Bili: x / Urobili: x   Blood: x / Protein: x / Nitrite: x   Leuk Esterase: x / RBC: x / WBC x   Sq Epi: x / Non Sq Epi: x / Bacteria: x        RADIOLOGY & ADDITIONAL STUDIES:    CT HEAD :  Acute hemorrhage in the chloe, 2 x 1.5 cm, and located both centrally and   left dorsally at nuclei of CNs 6 and 7. Mild edema surrounds it, but   without hydrocephalus or major mass effect.    Finding above discussed with Dr. Martinez at 7:07 PM on 2025.    CT PERFUSION:  No deficit reported.    CTA INTRACRANIAL:  No arterial steno-occlusive disease or evidence of aneurysm. Source of   bleed not elucidated, and favored to be hypertensive.    CTA EXTRACRANIAL:  No arterial steno-occlusive disease or evidence of dissection.    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     Patient is a 74y old  Female who presents with a chief complaint of Pontine hemorrhage (10 Apr 2025 00:09)      HPI:  Patient is a 73 y/o female with PMH of Raynaud's and HTN (off BP medication x 2 years) presents as a transfer from [facility] for pontine hemorrhage s/p nausea/vomiting, and dizziness while washing her mouth. At [facility] ED noted hypertensive on arrival with 's, requiring Cardene Drip. 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 Patient was transferred to [facility] for neurosurgical evaluation and c/o complaining of dizziness, right hand and foot decreased sensation to right extremities and double vision to left eye. Patient denies any AC/AP use and denies any recent trauma. Neurosurgery consulted and patient will be admitted to NSICU for close monitoring.      PAST MEDICAL & SURGICAL HISTORY:  HTN, Raynaud's  h/o Tubal Ligation ()  h/o lumpectomy, left breast ()    Social History:  Tobacco: Denies  ETOH: 2 - 3 "small" glasses of white wine 3 - 4 per week  Illicit drug abuse - Denies    FAMILY HISTORY:  - Father: HTN, Prostate CA.    - Mother: Breast CA (Dx )     Allergies:  - No Known Allergies    Intolerances:  - Denies    HOME MEDICATIONS :   - Cyanocobalamin 1,000 mg PO, Once Daily  - Cholecalciferol 1,000 IU mg PO, Once Daily       REVIEW OF SYSTEMS:    CONSTITUTIONAL: No fever, weight loss, or fatigue  EYES: No eye pain, or discharge. Endorses diplopia and blurry vision to left eye, with improvement if covering eye.  NECK: No pain or stiffness  RESPIRATORY: No cough, wheezing, chills or hemoptysis; No shortness of breath  CARDIOVASCULAR: No chest pain, palpitations, dizziness, or leg swelling  GASTROINTESTINAL: No abdominal or epigastric pain. No nausea, vomiting, or hematemesis; No diarrhea or constipation. No melena or hematochezia.  GENITOURINARY: No dysuria, frequency, hematuria, or incontinence  NEUROLOGICAL: No headaches, memory loss, loss of strength, numbness, or tremors. Endorses mild numbness to right hand and foot.  SKIN: No itching, burning, rashes, or lesions   LYMPH NODES: No enlarged glands  ENDOCRINE: No heat or cold intolerance; No hair loss  MUSCULOSKELETAL: No muscle pain, join pain or swelling. Endorses trouble walking  PSYCHIATRIC: No depression, anxiety, mood swings, or difficulty sleeping  HEME/LYMPH: No easy bruising, or bleeding gums  ALLERGY AND IMMUNOLOGIC: No hives or eczema    MEDICATIONS  (STANDING):  chlorhexidine 2% Cloths 1 Application(s) Topical daily  influenza  Vaccine (HIGH DOSE) 0.5 milliLiter(s) IntraMuscular once  polyethylene glycol 3350 17 Gram(s) Oral two times a day  senna 2 Tablet(s) Oral at bedtime  sodium chloride 0.9%. 1000 milliLiter(s) (60 mL/Hr) IV Continuous <Continuous>    MEDICATIONS  (PRN):  acetaminophen     Tablet .. 975 milliGRAM(s) Oral every 6 hours PRN Temp greater or equal to 38C (100.4F), Mild Pain (1 - 3)  hydrALAZINE Injectable 10 milliGRAM(s) IV Push every 2 hours PRN SBP> 160  labetalol Injectable 10 milliGRAM(s) IV Push every 2 hours PRN SBP> 160    Vital Signs Last 24 Hrs  T(C): 36.7 (10 Apr 2025 08:17), Max: 37.2 (2025 21:30)  T(F): 98 (10 Apr 2025 08:17), Max: 98.9 (2025 21:30)  HR: 72 (10 Apr 2025 11:23) (61 - 89)  BP: 139/77 (10 Apr 2025 11:23) (111/55 - 217/106)  BP(mean): 95 (10 Apr 2025 11:23) (89 - 144)  RR: 18 (10 Apr 2025 11:23) (12 - 20)  SpO2: 97% (10 Apr 2025 11:23) (95% - 100%)    Parameters below as of 10 Apr 2025 11:23  Patient On (Oxygen Delivery Method): room air      PHYSICAL EXAM:    GENERAL: NAD, well-groomed,  HEAD:  Atraumatic, Normocephalic  EYES: EOMI, PERRLA, conjunctiva and sclera clear, eyelids symmetrical  NECK: Supple, No JVD, Normal thyroid  NERVOUS SYSTEM:  Alert & Oriented X3, Good concentration; Facial sensation intact b/l. Face symmetric w/ normal eye closure and smile, tongue midline. Speech clear.  CHEST/LUNG: CTA  b/l,  no rales, rhonchi, wheezing, or rubs  HEART: Regular rate and rhythm; No murmurs, rubs, or gallops  ABDOMEN: Soft, Nontender, Nondistended; Bowel sounds present  EXTREMITIES:  2+ Peripheral Pulses, No clubbing, cyanosis, or edema , strength 5/5 all extremities   LYMPH: No lymphadenopathy noted  SKIN: No rashes or lesions    LABS:                        12.5   9.80  )-----------( 256      ( 10 Apr 2025 07:30 )             36.4     04-10    136  |  99  |  13.3  ----------------------------<  90  3.6   |  22.0  |  0.55    Ca    9.2      10 Apr 2025 09:00  Phos  3.6     04-10  Mg     2.0     04-10    TPro  7.5  /  Alb  4.3  /  TBili  0.4  /  DBili  x   /  AST  33[H]  /  ALT  13  /  AlkPhos  83  04-09    PT/INR - ( 2025 22:30 )   PT: 13.3 sec;   INR: 1.15 ratio      PTT - ( 2025 22:30 )  PTT:49.0 sec    Urinalysis Basic - ( 10 Apr 2025 09:00 )    Color: x / Appearance: x / SG: x / pH: x  Gluc: 90 mg/dL / Ketone: x  / Bili: x / Urobili: x   Blood: x / Protein: x / Nitrite: x   Leuk Esterase: x / RBC: x / WBC x   Sq Epi: x / Non Sq Epi: x / Bacteria: x        RADIOLOGY & ADDITIONAL STUDIES:    CT HEAD :  Acute hemorrhage in the chloe, 2 x 1.5 cm, and located both centrally and   left dorsally at nuclei of CNs 6 and 7. Mild edema surrounds it, but   without hydrocephalus or major mass effect.    Finding above discussed with Dr. Martinez at 7:07 PM on 2025.    CT PERFUSION:  No deficit reported.    CTA INTRACRANIAL:  No arterial steno-occlusive disease or evidence of aneurysm. Source of   bleed not elucidated, and favored to be hypertensive.    CTA EXTRACRANIAL:  No arterial steno-occlusive disease or evidence of dissection.    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

## 2025-04-10 NOTE — SWALLOW BEDSIDE ASSESSMENT ADULT - SWALLOW EVAL: RECOMMENDED DIET
NPO w/consideration for short term non oral alternative means of nutrition/hydration as per Pt/family wishes

## 2025-04-10 NOTE — PHYSICAL THERAPY INITIAL EVALUATION ADULT - ADDITIONAL COMMENTS
Pt lives alone in a private home, pt's son lives next door. 2 steps to enter front without handrails, 3 steps to enter via back entrance with handrails. No Steps inside. Pt was independent PTA without an assist device. Pt owns no DME.

## 2025-04-11 ENCOUNTER — RESULT REVIEW (OUTPATIENT)
Age: 75
End: 2025-04-11

## 2025-04-11 LAB
ANION GAP SERPL CALC-SCNC: 16 MMOL/L — SIGNIFICANT CHANGE UP (ref 5–17)
BUN SERPL-MCNC: 15.7 MG/DL — SIGNIFICANT CHANGE UP (ref 8–20)
CALCIUM SERPL-MCNC: 8.7 MG/DL — SIGNIFICANT CHANGE UP (ref 8.4–10.5)
CHLORIDE SERPL-SCNC: 105 MMOL/L — SIGNIFICANT CHANGE UP (ref 96–108)
CO2 SERPL-SCNC: 20 MMOL/L — LOW (ref 22–29)
CREAT SERPL-MCNC: 0.54 MG/DL — SIGNIFICANT CHANGE UP (ref 0.5–1.3)
EGFR: 96 ML/MIN/1.73M2 — SIGNIFICANT CHANGE UP
EGFR: 96 ML/MIN/1.73M2 — SIGNIFICANT CHANGE UP
GLUCOSE SERPL-MCNC: 76 MG/DL — SIGNIFICANT CHANGE UP (ref 70–99)
HCT VFR BLD CALC: 38.2 % — SIGNIFICANT CHANGE UP (ref 34.5–45)
HGB BLD-MCNC: 12.8 G/DL — SIGNIFICANT CHANGE UP (ref 11.5–15.5)
MAGNESIUM SERPL-MCNC: 1.9 MG/DL — SIGNIFICANT CHANGE UP (ref 1.6–2.6)
MCHC RBC-ENTMCNC: 30.2 PG — SIGNIFICANT CHANGE UP (ref 27–34)
MCHC RBC-ENTMCNC: 33.5 G/DL — SIGNIFICANT CHANGE UP (ref 32–36)
MCV RBC AUTO: 90.1 FL — SIGNIFICANT CHANGE UP (ref 80–100)
NRBC # BLD AUTO: 0 K/UL — SIGNIFICANT CHANGE UP (ref 0–0)
NRBC # FLD: 0 K/UL — SIGNIFICANT CHANGE UP (ref 0–0)
NRBC BLD AUTO-RTO: 0 /100 WBCS — SIGNIFICANT CHANGE UP (ref 0–0)
PHOSPHATE SERPL-MCNC: 3.5 MG/DL — SIGNIFICANT CHANGE UP (ref 2.4–4.7)
PLATELET # BLD AUTO: 261 K/UL — SIGNIFICANT CHANGE UP (ref 150–400)
PMV BLD: 10.6 FL — SIGNIFICANT CHANGE UP (ref 7–13)
POTASSIUM SERPL-MCNC: 3.5 MMOL/L — SIGNIFICANT CHANGE UP (ref 3.5–5.3)
POTASSIUM SERPL-SCNC: 3.5 MMOL/L — SIGNIFICANT CHANGE UP (ref 3.5–5.3)
RBC # BLD: 4.24 M/UL — SIGNIFICANT CHANGE UP (ref 3.8–5.2)
RBC # FLD: 14.9 % — HIGH (ref 10.3–14.5)
SODIUM SERPL-SCNC: 141 MMOL/L — SIGNIFICANT CHANGE UP (ref 135–145)
WBC # BLD: 8.91 K/UL — SIGNIFICANT CHANGE UP (ref 3.8–10.5)
WBC # FLD AUTO: 8.91 K/UL — SIGNIFICANT CHANGE UP (ref 3.8–10.5)

## 2025-04-11 PROCEDURE — 99232 SBSQ HOSP IP/OBS MODERATE 35: CPT

## 2025-04-11 PROCEDURE — 99222 1ST HOSP IP/OBS MODERATE 55: CPT

## 2025-04-11 PROCEDURE — 93306 TTE W/DOPPLER COMPLETE: CPT | Mod: 26

## 2025-04-11 PROCEDURE — 99231 SBSQ HOSP IP/OBS SF/LOW 25: CPT

## 2025-04-11 RX ORDER — MAGNESIUM HYDROXIDE 400 MG/5ML
30 SUSPENSION ORAL DAILY
Refills: 0 | Status: DISCONTINUED | OUTPATIENT
Start: 2025-04-11 | End: 2025-04-11

## 2025-04-11 RX ORDER — ENOXAPARIN SODIUM 100 MG/ML
30 INJECTION SUBCUTANEOUS EVERY 24 HOURS
Refills: 0 | Status: DISCONTINUED | OUTPATIENT
Start: 2025-04-11 | End: 2025-04-18

## 2025-04-11 RX ORDER — BISACODYL 5 MG
10 TABLET, DELAYED RELEASE (ENTERIC COATED) ORAL DAILY
Refills: 0 | Status: DISCONTINUED | OUTPATIENT
Start: 2025-04-11 | End: 2025-04-18

## 2025-04-11 RX ADMIN — Medication 50 MILLIEQUIVALENT(S): at 17:51

## 2025-04-11 RX ADMIN — Medication 10 MILLIGRAM(S): at 18:18

## 2025-04-11 RX ADMIN — ENOXAPARIN SODIUM 30 MILLIGRAM(S): 100 INJECTION SUBCUTANEOUS at 21:19

## 2025-04-11 RX ADMIN — Medication 1 APPLICATION(S): at 06:07

## 2025-04-11 RX ADMIN — Medication 60 MILLILITER(S): at 21:19

## 2025-04-11 RX ADMIN — Medication 10 MILLIGRAM(S): at 10:07

## 2025-04-11 RX ADMIN — Medication 60 MILLILITER(S): at 10:51

## 2025-04-11 RX ADMIN — Medication 50 MILLIEQUIVALENT(S): at 11:07

## 2025-04-11 NOTE — PROGRESS NOTE ADULT - ASSESSMENT
ASSESSMENT:   74F currently admitted to Neurosurgery stepdown for pontine hemorrhage seen and evaluated while laying in bed. No acute events overnight. Patient reports persistent double vision which improves when either eye is covered. She also notes persistent yet partially improved R-sided facial, hand and foot numbness/tingling. She notes feeling generally more weak throughout when getting out of bed which she attributes to being in the hospital. Otherwise, patient denies headache, neck pain, increasing lethargy, new or worsening vision changes, new or worsening changes in sensation, difficulty ambulating. Neurological exam remains stable at this time    PLAN:   - Q2 neuro checks and vital signs.   - PO pain regimen held at this time due to NPO status. Patient without complaints of pain at this time. Will follow-up S/S evaluation. Otherwise, will provide IV pain medication/Ofirmev as needed.   - SBP ; PRN Hydralazine and Labetalol.   - on RA; encourage incentive spirometry.   - Diet: NPO. S/S 4/10 recommend NPO with short term non-oral alternative for feeding. Will follow-up repeat S/S evaluation today.   - LBM: 4/9. Bowel regimen on hold due to NPO status. Will follow-up S/S evaluation and advance bowel regimen accordingly.   - NS @ 60cc/hr while NPO. Voiding spontaneously.   - DVT ppx: SCDs. Will resume SQL 30mg QD tonight.   - F/u AM labs.   - PT/OT: Recommend AIR with rolling walker.   - Medicine following; appreciate recommendations.   - Plan discussed with Dr. Farmer.    ASSESSMENT:   74F currently admitted to Neurosurgery stepdown for pontine hemorrhage seen and evaluated while laying in bed. No acute events overnight. Patient reports persistent double vision which improves when either eye is covered. She also notes persistent yet partially improved R-sided facial, hand and foot numbness/tingling. She notes feeling generally more weak throughout when getting out of bed which she attributes to being in the hospital. Otherwise, patient denies headache, neck pain, increasing lethargy, new or worsening vision changes, new or worsening changes in sensation, difficulty ambulating. Neurological exam remains stable at this time    PLAN:   - Q2 neuro checks and vital signs.   - PO pain regimen held at this time due to NPO status. Patient without complaints of pain at this time. Will follow-up S/S evaluation. Otherwise, will provide IV pain medication/Ofirmev as needed.   - SBP ; PRN Hydralazine and Labetalol.   - on RA; encourage incentive spirometry.   - Diet: NPO. S/S 4/10 recommend NPO with short term non-oral alternative for feeding. Will follow-up repeat S/S evaluation today.   - LBM: 4/9. Bowel regimen on hold due to NPO status. Will follow-up S/S evaluation and advance bowel regimen accordingly.   - NS @ 60cc/hr while NPO. Voiding spontaneously.   - DVT ppx: SCDs. Will resume SQL 30mg QD tonight.   - F/u AM labs. Potassium repleted.   - PT/OT: Recommend AIR with rolling walker.   - Medicine following; appreciate recommendations.   - Plan discussed with Dr. Farmer.    ASSESSMENT:   74F currently admitted to Neurosurgery stepdown for pontine hemorrhage seen and evaluated while laying in bed. No acute events overnight. Patient reports persistent double vision which improves when either eye is covered. She also notes persistent yet partially improved R-sided facial, hand and foot numbness/tingling. She notes feeling generally more weak throughout when getting out of bed which she attributes to being in the hospital. Otherwise, patient denies headache, neck pain, increasing lethargy, new or worsening vision changes, new or worsening changes in sensation, difficulty ambulating. Neurological exam remains stable at this time.     PLAN:   - Q2 neuro checks and vital signs.   - PO pain regimen held at this time due to NPO status. Patient without complaints of pain at this time. Will provide IV pain medication/Ofirmev as needed pending S/S re-eval.   - SBP ; PRN Hydralazine and Labetalol.   - on RA; encourage incentive spirometry.   - Diet: NPO. S/S 4/10 and 4/11 recommend NPO with short term non-oral alternative for feeding. Will follow-up repeat S/S tomorrow.   - LBM: 4/9. Dulcolax 10mg rectally PRN QD.   - NS @ 60cc/hr while NPO. Voiding spontaneously.   - DVT ppx: SCDs. Will resume SQL 30mg QD tonight.   - F/u AM labs. Potassium repleted.   - PT/OT: Recommend AIR with rolling walker.   - Medicine following; appreciate recommendations.   - Plan discussed with Dr. Farmer.    ASSESSMENT:   74F currently admitted to Neurosurgery stepdown for pontine hemorrhage seen and evaluated while laying in bed. No acute events overnight. Patient reports persistent double vision which improves when either eye is covered. She also notes persistent yet partially improved R-sided facial, hand and foot numbness/tingling. She notes feeling generally more weak throughout when getting out of bed which she attributes to being in the hospital. Otherwise, patient denies headache, neck pain, increasing lethargy, new or worsening vision changes, new or worsening changes in sensation, difficulty ambulating. Neurological exam remains stable at this time.     PLAN:   - Q2 neuro checks and vital signs.   - PO pain regimen held at this time due to NPO status. Patient without complaints of pain at this time. Will provide IV pain medication/Ofirmev as needed pending S/S re-eval.   - SBP ; PRN Hydralazine and Labetalol.   - on RA; encourage incentive spirometry.   - Diet: NPO. S/S 4/10 and 4/11 recommend NPO with short term non-oral alternative for feeding. Patient refusing NGT at this time and would like repeat eval tomorrow. Will follow-up repeat S/S tomorrow.   - LBM: 4/9. Dulcolax 10mg rectally PRN QD.   - NS @ 60cc/hr while NPO. Voiding spontaneously.   - DVT ppx: SCDs. Will resume SQL 30mg QD tonight.   - F/u AM labs. Potassium repleted.   - PT/OT: Recommend AIR with rolling walker.   - Medicine following; appreciate recommendations.   - Plan discussed with Dr. Farmer.

## 2025-04-11 NOTE — DIETITIAN INITIAL EVALUATION ADULT - PERTINENT MEDS FT
MEDICATIONS  (STANDING):  influenza  Vaccine (HIGH DOSE) 0.5 milliLiter(s) IntraMuscular once  sodium chloride 0.9%. 1000 milliLiter(s) (60 mL/Hr) IV Continuous <Continuous>    MEDICATIONS  (PRN):  hydrALAZINE Injectable 10 milliGRAM(s) IV Push every 2 hours PRN SBP> 160  labetalol Injectable 10 milliGRAM(s) IV Push every 2 hours PRN SBP> 160

## 2025-04-11 NOTE — DIETITIAN INITIAL EVALUATION ADULT - OTHER INFO
Pt is a 73 y/o Female with PMH Raynaud's and HTN presented to [facility] c/o visual changes, R sided numbness, and dizziness. Neurosurgery consulted for CTH with acute 2 x 1.5cm chloe IPH. Patient transferred to [facility] for Neurosurgical ICU admission and further management.

## 2025-04-11 NOTE — DIETITIAN INITIAL EVALUATION ADULT - ENERGY INTAKE
Nurses note reviewed but is diabetic.  Patient presents today with a CC of painful, inflammed toenails which the patient states hurt inside the shoes. The pain is related as being a pressure type pain occurring when the tops of the shoes rub on the toenails. The pain is related as being mild to moderate, and is made worse with dressier shoes.  She has no new issues to report today.  Her son is with her today.    Physical exam reveals nails that are thickened, lytic,brittle, yellow-brown discolored with marked subungual debris. There is noted pain to palpation of the nails. The nails are flaky and also exhibit an odor consistent with onchychomycosis. Nails affected are RIGHT 1.  Pulses are present.  B/L bunions with no areas of rubbing or pressure.    Diagnosis is DM, bunion, and dermatophytosis of the nails with pain.    All the affected nails were debrided and thinned in a dorsal-plantar direction using an electric . The diseased nail plate was removed to the bed using the . There were no cuts.   Diabetic foot care discussed.  Proper blood sugar stressed.  Her bunions seem to be doing fine and should try to just wear slippers around the house.  Patient is instructed to RTC in 2 months or sooner if problems develop.        NPO NPO

## 2025-04-11 NOTE — OCCUPATIONAL THERAPY INITIAL EVALUATION ADULT - VISUAL ACUITY
+reading/distance glasses; Pt reports double vision predominately in L eye and light sensitivity. Central and peripheral fields intact bilaterally. Pt given eye patch and instructed on wearing schedule to alternate between eyes every few hours.

## 2025-04-11 NOTE — OCCUPATIONAL THERAPY INITIAL EVALUATION ADULT - LIVES WITH, PROFILE
Pt lives in a house alone, son lives next door can assist prn but not 24/7. 3 DANITA with handrail to a landing + 1 step to enter. Flight with handrail inside to bed/bath/alone

## 2025-04-11 NOTE — PROGRESS NOTE ADULT - SUBJECTIVE AND OBJECTIVE BOX
[facility] Division of Hospital Medicine    Chief Complaint:  Dizziness, right hand and foot decreased sensation to right extremities and double vision to left eye.    SUBJECTIVE / OVERNIGHT EVENTS: No acute events overnight. Patient examined at bedside. BP has remained within goal SBP  mmHg, for approximately 24 hours. Endorses mild improvement in right sided numbness and no change in diplopia. Face remains symmetric.    Patient denies chest pain, SOB, abd pain, N/V, fever, chills, dysuria or any other complaints. All remainder ROS negative.     MEDICATIONS  (STANDING):  chlorhexidine 2% Cloths 1 Application(s) Topical daily  influenza  Vaccine (HIGH DOSE) 0.5 milliLiter(s) IntraMuscular once  sodium chloride 0.9%. 1000 milliLiter(s) (60 mL/Hr) IV Continuous <Continuous>    MEDICATIONS  (PRN):  hydrALAZINE Injectable 10 milliGRAM(s) IV Push every 2 hours PRN SBP> 160  labetalol Injectable 10 milliGRAM(s) IV Push every 2 hours PRN SBP> 160      I&O's Summary    10 Apr 2025 07:01  -  11 Apr 2025 07:00  --------------------------------------------------------  IN: 1480 mL / OUT: 1150 mL / NET: 330 mL    11 Apr 2025 07:01  -  11 Apr 2025 09:17  --------------------------------------------------------  IN: 120 mL / OUT: 0 mL / NET: 120 mL      PHYSICAL EXAM:  Vital Signs Last 24 Hrs  T(C): 36.6 (11 Apr 2025 08:00), Max: 36.8 (10 Apr 2025 14:02)  T(F): 97.9 (11 Apr 2025 08:00), Max: 98.2 (10 Apr 2025 14:02)  HR: 76 (11 Apr 2025 08:00) (61 - 78)  BP: 154/81 (11 Apr 2025 08:00) (121/75 - 172/129)  BP(mean): 104 (11 Apr 2025 08:00) (83 - 144)  RR: 20 (11 Apr 2025 08:00) (13 - 24)  SpO2: 100% (11 Apr 2025 08:00) (94% - 100%)    Parameters below as of 11 Apr 2025 08:00  Patient On (Oxygen Delivery Method): room air    GENERAL: NAD, well-groomed,  HEAD:  Atraumatic, Normocephalic  EYES: PERRLA, conjunctiva and sclera clear, eyelids symmetrical  NECK: Supple, No JVD, Normal thyroid  NERVOUS SYSTEM:  Alert & Oriented X4, Good concentration; Face symmetric w/ normal eye closure and smile, tongue midline. Speech clear.  CHEST/LUNG: CTA  b/l,  no rales, rhonchi, wheezing, or rubs  HEART: Regular rate and rhythm; grade 2 systolic murmur appreciated  ABDOMEN: Soft, Nontender, Nondistended; Bowel sounds present  EXTREMITIES:  2+ Peripheral Pulses, No clubbing, cyanosis, or edema , strength 5/5 all extremities in bed  LYMPH: No lymphadenopathy noted  SKIN: No rashes or lesions    LABS:                        12.8   8.91  )-----------( 261      ( 11 Apr 2025 03:50 )             38.2     04-11    141  |  105  |  15.7  ----------------------------<  76  3.5   |  20.0[L]  |  0.54    Ca    8.7      11 Apr 2025 03:50  Phos  3.5     04-11  Mg     1.9     04-11    TPro  7.5  /  Alb  4.3  /  TBili  0.4  /  DBili  x   /  AST  33[H]  /  ALT  13  /  AlkPhos  83  04-09    PT/INR - ( 09 Apr 2025 22:30 )   PT: 13.3 sec;   INR: 1.15 ratio         PTT - ( 09 Apr 2025 22:30 )  PTT:49.0 sec      Urinalysis Basic - ( 11 Apr 2025 03:50 )    Color: x / Appearance: x / SG: x / pH: x  Gluc: 76 mg/dL / Ketone: x  / Bili: x / Urobili: x   Blood: x / Protein: x / Nitrite: x   Leuk Esterase: x / RBC: x / WBC x   Sq Epi: x / Non Sq Epi: x / Bacteria: x      RADIOLOGY & ADDITIONAL TESTS:    CT HEAD 04/09:  Acute hemorrhage in the chloe, 2 x 1.5 cm, and located both centrally and   left dorsally at nuclei of CNs 6 and 7. Mild edema surrounds it, but   without hydrocephalus or major mass effect.    Finding above discussed with Dr. Martinez at 7:07 PM on 4/9/2025.    CT PERFUSION:  No deficit reported.    CTA INTRACRANIAL:  No arterial steno-occlusive disease or evidence of aneurysm. Source of   bleed not elucidated, and favored to be hypertensive.    CTA EXTRACRANIAL:  No arterial steno-occlusive disease or evidence of dissection.    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

## 2025-04-11 NOTE — PROGRESS NOTE ADULT - ASSESSMENT
75 y/o Female with PMH Raynaud's and HTN presented to [facility] c/o visual changes, R sided numbness, and dizziness. Neurosurgery consulted for CTH with acute 2 x 1.5cm chloe IPH. Patient transferred to [facility] for Neurosurgical ICU admission and further management.      Intraparenchymal Hemorrhage  - CTH with acute 2 x 1.5cm chloe IPH, 04/09  - Repeat CTH, stable no change, 04/10  - Aspiration Precautions  - Seizure Precautions  - c/w PT/OT recs  - Activity increased at tolerated  - Neurosurgery Recs appreciated:  - Downgrade to Neurosurgery Step down unit  - Q2 neuro checks and vitals  - HOB at 30 degrees, neck midline position  - Stroke neurology consult  - PRN Tylenol 975mg q6hr   - Stroke patient education    Raynauds, Improved.  - If low Spo2 pleth, consider using earlobe for monitoring  - Heat packs prn    HTN  - Maintain SBPs per Neurosx  - c/w Hydralazine 10mg or Labetalol 10mg IV Q2H for SBP > 160  - TTE pending  - Magnesium and Potassium supplementation    Dysphagia  - c/w NPO  - failed bedside dysphagia screen, to repeat 04/11  - c/w NS Maintenance Infusion and monitor electrolytes daily    Constipation  - c/w senna and miralax  - consider adding glycerin suppository or enema    PPX  - SCD's  - Hold AC until cleared by Dr. Farmer.    Disposition: Patient remains hemodynamically stable and is cleared for downgrade to NSCU     73 y/o Female with PMH Raynaud's and HTN presented to [facility] c/o visual changes, R sided numbness, and dizziness. Neurosurgery consulted for CTH with acute 2 x 1.5cm chloe IPH. Patient transferred to [facility] for Neurosurgical ICU admission and further management.    Intraparenchymal Hemorrhage  - CTH with acute 2 x 1.5cm chloe IPH, 04/09  - Repeat CTH, stable no change, 04/10  - Aspiration Precautions  - Seizure Precautions  - c/w PT/OT recs  - Activity increased at tolerated  - Neurosurgery Recs appreciated:  - Downgrade to Neurosurgery Step down unit  - Q2 neuro checks and vitals  - HOB at 30 degrees, neck midline position  - Stroke neurology consult  - PRN Tylenol 975mg q6hr   - Stroke patient education    Dysphagia  - c/w NPO  - failed bedside dysphagia screen, to repeat 04/11, if fails again consider NGT with tube feeds or GOC conversation.  - c/w NS Maintenance Infusion and monitor electrolytes daily    Urinary retention  - OOB to chair  - bladder scan   - Straight cath per Nursing protocol.     Raynauds, Improved.  - If low Spo2 pleth, consider using earlobe for monitoring  - Heat packs prn    HTN  - Maintain SBPs per Neurosx  - c/w Hydralazine 10mg or Labetalol 10mg IV Q2H for SBP > 160  - TTE pending  - Magnesium and Potassium supplementation    Constipation  last BM 4/9  - c/w senna and Miralax  - Add PRN MOM, ordered  - Added PRN Dulcolax suppositoy, ordered  - consider adding enema if above does not work    PPX  - SCD's  - Hold AC until cleared by Dr. Farmer.    Disposition: Patient remains hemodynamically stable and is cleared for downgrade to NSCU

## 2025-04-11 NOTE — DIETITIAN INITIAL EVALUATION ADULT - PERTINENT LABORATORY DATA
04-11    141  |  105  |  15.7  ----------------------------<  76  3.5   |  20.0[L]  |  0.54    Ca    8.7      11 Apr 2025 03:50  Phos  3.5     04-11  Mg     1.9     04-11    TPro  7.5  /  Alb  4.3  /  TBili  0.4  /  DBili  x   /  AST  33[H]  /  ALT  13  /  AlkPhos  83  04-09  A1C with Estimated Average Glucose Result: 5.4 % (04-09-25 @ 22:30)

## 2025-04-11 NOTE — OCCUPATIONAL THERAPY INITIAL EVALUATION ADULT - PHYSICAL ASSIST/NONPHYSICAL ASSIST: SIT/STAND, REHAB EVAL
+posterior lean, cues for weight distribution/shifting/verbal cues/nonverbal cues (demo/gestures)/1 person assist

## 2025-04-11 NOTE — PROGRESS NOTE ADULT - SUBJECTIVE AND OBJECTIVE BOX
HPI:  Patient is a 74-year-old female PMH Raynaud's HTN (off BP medication x 2 years) presents as a transfer from [facility] for ICH. Patient states that she woke up this morning around 7AM with no issues, then around 10:30AM she was brushing her teeth when she had a sudden onset of dizziness, nausea, episode of vomiting and laid down on her bed. Her son found her at 2:30PM laying down while she was still complaining of dizziness, and also was experiencing mouth numbness, right sided numbness and double vision. She was brought to [facility] via ambulance and on arrival she was hypertensive to 's requiring multiple pushes of IV medications and ultimately required a Cardene drip. 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 She was transferred to [facility] for neurosurgical evaluation. On arrival patient complaining of dizziness, right hand and foot decreased sensation, and double vision. Patient denies any AC/AP use and denies any recent trauma. Neurosurgery consulted and patient will be admitted to NSICU for close monitoring.    mRs: 0  ICH score: 1  NIHSS on Arrival: 3 (09 Apr 2025 21:51)    INTERVAL HPI/OVERNIGHT EVENTS:  74F currently admitted to Neurosurgery stepdown for pontine hemorrhage seen and evaluated while laying in bed. No acute events overnight. Patient reports persistent double vision which improves when either eye is covered. She also notes persistent yet partially improved R-sided facial, hand and foot numbness/tingling. She notes feeling generally more weak throughout when getting out of bed which she attributes to being in the hospital. Otherwise, patient denies headache, neck pain, increasing lethargy, new or worsening vision changes, new or worsening changes in sensation, difficulty ambulating. She is currently NPO awaiting speech/swallow re-evaluation, voiding spontaneously, and last bowel movement documented 4/9, denies abdominal pain.        Vital Signs Last 24 Hrs:   T(C): 36.6 (11 Apr 2025 08:00), Max: 36.8 (10 Apr 2025 14:02)  T(F): 97.9 (11 Apr 2025 08:00), Max: 98.2 (10 Apr 2025 14:02)  HR: 82 (11 Apr 2025 10:15) (68 - 82)  BP: 147/74 (11 Apr 2025 10:15) (121/75 - 172/129)  BP(mean): 85 (11 Apr 2025 10:15) (83 - 144)  RR: 15 (11 Apr 2025 10:15) (13 - 24)  SpO2: 98% (11 Apr 2025 10:15) (94% - 100%)    Parameters below as of 11 Apr 2025 08:00  Patient On (Oxygen Delivery Method): room air    PHYSICAL EXAM:  GENERAL: NAD  HEAD: Atraumatic, normocephalic  MENTAL STATUS: AAOx3; awake; opens eyes spontaneously; appropriately conversant without aphasia; following simple commands  CRANIAL NERVES: Visual fields full to confrontation, Pupils 3mm reactive bilaterally, +L eye CN6 palsy otherwise EOMI and cross midline. Facial sensation intact V1-3 distribution b/l, notes numbness/tingling to R side. Face symmetric w/ normal eye closure and smile, tongue midline. Hearing grossly intact. Speech clear.   MOTOR: Strength 5/5 b/l upper and lower extremities, no UE or LE drift  SENSATION: + subjective decreased sensation to R hand and R foot   COORDINATION: No upper extremity dysmetria  CHEST/LUNG: Non-labored breathing on room-air  HEART: Regular rate and rhythm on monitor  SKIN: Warm, dry    LABS:                  12.8   8.91  )-----------( 261      ( 11 Apr 2025 03:50 )             38.2     04-11    141  |  105  |  15.7  ----------------------------<  76  3.5   |  20.0[L]  |  0.54    Ca    8.7      11 Apr 2025 03:50  Phos  3.5     04-11  Mg     1.9     04-11    TPro  7.5  /  Alb  4.3  /  TBili  0.4  /  DBili  x   /  AST  33[H]  /  ALT  13  /  AlkPhos  83  04-09    PT/INR - ( 09 Apr 2025 22:30 )   PT: 13.3 sec;   INR: 1.15 ratio    PTT - ( 09 Apr 2025 22:30 )  PTT:49.0 sec    Urinalysis Basic - ( 11 Apr 2025 03:50 )  Color: x / Appearance: x / SG: x / pH: x  Gluc: 76 mg/dL / Ketone: x  / Bili: x / Urobili: x   Blood: x / Protein: x / Nitrite: x   Leuk Esterase: x / RBC: x / WBC x   Sq Epi: x / Non Sq Epi: x / Bacteria: x    04-10 @ 07:01  -  04-11 @ 07:00  --------------------------------------------------------  IN: 1480 mL / OUT: 1150 mL / NET: 330 mL    04-11 @ 07:01  -  04-11 @ 10:49  --------------------------------------------------------  IN: 120 mL / OUT: 0 mL / NET: 120 mL    RADIOLOGY & ADDITIONAL TESTS:  < from: MR Head w/wo IV Cont (04.10.25 @ 02:03) >  IMPRESSION:  2 cm hemorrhage within the mid to posterior chloe with   punctate hemorrhage centrally which may reflect a focal cavernoma.  Mild   chronic periventricular white matter ischemia.    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    < from: CT Brain Stroke Protocol (04.09.25 @ 19:03) >  IMPRESSION:    CT HEAD:    Acute hemorrhage in the chloe, 2 x 1.5 cm, and located both centrally and   left dorsally at nuclei of CNs 6 and 7. Mild edema surrounds it, but   without hydrocephalus or major mass effect.    Finding above discussed with Dr. Martinez at 7:07 PM on 4/9/2025.    CT PERFUSION:  No deficit reported.    CTA INTRACRANIAL:  No arterial steno-occlusive disease or evidence of aneurysm. Source of   bleed not elucidated, and favored to be hypertensive.    CTA EXTRACRANIAL:  No arterial steno-occlusive disease or evidence of dissection.

## 2025-04-11 NOTE — OCCUPATIONAL THERAPY INITIAL EVALUATION ADULT - IMPAIRED TRANSFERS: SIT/STAND, REHAB EVAL
vision impairments/impaired balance/impaired coordination/narrow base of support/impaired postural control/decreased strength

## 2025-04-11 NOTE — PROGRESS NOTE ADULT - ATTENDING COMMENTS
74F with PMH Raynaud's and HTN presented to [facility] c/o visual changes, R sided numbness, and dizziness. Patient transferred to [facility] for Neurosurgical ICU admission for Pontine hemorrhage.   Discussed with NP Senthil and agree with above. Changes made to test.     PHYSICAL EXAM:  Constitutional: No acute distress, alert and oriented by 3  HEENT: AT/NC, EOMI, PERRLA, Normal conjunctiva, no pharyngeal erythema, moist oral mucosa  Respiratory: CTA BL, equal breath sounds, no crackles or wheezing  Cardiovascular: RRR, no edema  Gastrointestinal: soft, Non-tender, Non-distended + Bowel sounds, no rebound or guarding  Genitourinary: no holden  Musculoskeletal: No joint edema  Neurological: Neurological:  CN6 palsy, normal strength in bl upper and lower extremities.  Skin: warm, dry and intact  Psychiatric: normal mood and affect    Patient now having difficulty with urinations. Added more bowel regimen and bladder scan q6 hours.   Ambulate as tolerated. 74F with PMH Raynaud's and HTN presented to [facility] c/o visual changes, R sided numbness, and dizziness. Patient transferred to [facility] for Neurosurgical ICU admission for Pontine hemorrhage.   Discussed with NP Senthil and agree with above. Changes made to test.     PHYSICAL EXAM:  Constitutional: No acute distress, alert and oriented by 3  HEENT: AT/NC, EOMI, PERRLA, Normal conjunctiva, no pharyngeal erythema, moist oral mucosa  Respiratory: CTA BL, equal breath sounds, no crackles or wheezing  Cardiovascular: RRR, no edema  Gastrointestinal: soft, Non-tender, Non-distended + Bowel sounds, no rebound or guarding  Genitourinary: no holden  Musculoskeletal: No joint edema  Neurological: Neurological:  CN6 palsy, normal strength in bl upper and lower extremities.  Skin: warm, dry and intact  Psychiatric: normal mood and affect    Patient now having difficulty with urinations. Added more bowel regimen and bladder scan q6 hours.   Ambulate as tolerated.  if patient fails swallow eval again, consider alternate means of nutrition through the weekend.   Medicine will follow.

## 2025-04-11 NOTE — OCCUPATIONAL THERAPY INITIAL EVALUATION ADULT - IMPAIRED TRANSFERS: TOILET, REHAB EVAL
vision impairments/impaired balance/impaired coordination/narrow base of support/impaired postural control/decreased ROM/decreased strength

## 2025-04-11 NOTE — CONSULT NOTE ADULT - SUBJECTIVE AND OBJECTIVE BOX
HPI:  Patient is a 74-year-old female PMH Raynaud's HTN (off BP medication x 2 years) presents as a transfer from [facility] for ICH. Patient states that she woke up this morning around 7AM with no issues, then around 10:30AM she was brushing her teeth when she had a sudden onset of dizziness, nausea, episode of vomiting and laid down on her bed. Her son found her at 2:30PM laying down while she was still complaining of dizziness, and also was experiencing mouth numbness, right sided numbness and double vision. She was brought to [facility] via ambulance and on arrival she was hypertensive to 's requiring multiple pushes of IV medications and ultimately required a Cardene drip. 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 She was transferred to [facility] for neurosurgical evaluation. On arrival patient complaining of dizziness, right hand and foot decreased sensation, and double vision. Patient denies any AC/AP use and denies any recent trauma. Neurosurgery consulted and patient will be admitted to NSICU for close monitoring.        Imaging performed:      REVIEW OF SYSTEMS  Constitutional - No fever, No fatigue  HEENT - +double vision   Respiratory - No cough, No wheezing, No shortness of breath  Cardiovascular - No chest pain, No palpitations  Gastrointestinal - No abdominal pain, No nausea, No vomiting, No diarrhea, No constipation  Genitourinary - No dysuria, No incontinence  Neurological - No headaches, No memory loss, +loss of strength, No numbness, No tremors  Skin - No itching, No rashes, No lesions   Musculoskeletal - No joint pain, No joint swelling, No muscle pain  Psychiatric - No depression, No anxiety        PAST MEDICAL & SURGICAL HISTORY  HTN (hypertension)    S/P lumpectomy, left breast        FUNCTIONAL HISTORY  Lives alone in , son lives next door.   Independent in ADLs and ambulation PTA    CURRENT FUNCTIONAL STATUS  PT 4/10  bed mobility - min-mod A  transfers - min A   gait - bed to chair     OT - pending eval    SLP - pending eval     SOCIAL HISTORY - as per documentation/history  Smoking - None  EtOH - None  Drugs - None    FAMILY HISTORY   noncontributory     RECENT LABS - Reviewed  CBC Full  -  ( 11 Apr 2025 03:50 )  WBC Count : 8.91 K/uL  RBC Count : 4.24 M/uL  Hemoglobin : 12.8 g/dL  Hematocrit : 38.2 %  Platelet Count - Automated : 261 K/uL  Mean Cell Volume : 90.1 fl  Mean Cell Hemoglobin : 30.2 pg  Mean Cell Hemoglobin Concentration : 33.5 g/dL  Auto Neutrophil # : x  Auto Lymphocyte # : x  Auto Monocyte # : x  Auto Eosinophil # : x  Auto Basophil # : x  Auto Neutrophil % : x  Auto Lymphocyte % : x  Auto Monocyte % : x  Auto Eosinophil % : x  Auto Basophil % : x    04-11    141  |  105  |  15.7  ----------------------------<  76  3.5   |  20.0[L]  |  0.54    Ca    8.7      11 Apr 2025 03:50  Phos  3.5     04-11  Mg     1.9     04-11    TPro  7.5  /  Alb  4.3  /  TBili  0.4  /  DBili  x   /  AST  33[H]  /  ALT  13  /  AlkPhos  83  04-09    Urinalysis Basic - ( 11 Apr 2025 03:50 )    Color: x / Appearance: x / SG: x / pH: x  Gluc: 76 mg/dL / Ketone: x  / Bili: x / Urobili: x   Blood: x / Protein: x / Nitrite: x   Leuk Esterase: x / RBC: x / WBC x   Sq Epi: x / Non Sq Epi: x / Bacteria: x        ALLERGIES  No Known Allergies      MEDICATIONS   bisacodyl Suppository 10 milliGRAM(s) Rectal daily PRN  chlorhexidine 2% Cloths 1 Application(s) Topical daily  enoxaparin Injectable 30 milliGRAM(s) SubCutaneous every 24 hours  hydrALAZINE Injectable 10 milliGRAM(s) IV Push every 2 hours PRN  influenza  Vaccine (HIGH DOSE) 0.5 milliLiter(s) IntraMuscular once  labetalol Injectable 10 milliGRAM(s) IV Push every 2 hours PRN  potassium chloride  20 mEq/100 mL IVPB 20 milliEquivalent(s) IV Intermittent every 2 hours  sodium chloride 0.9%. 1000 milliLiter(s) IV Continuous <Continuous>      VITALS  T(C): 36.6 (04-11-25 @ 08:00), Max: 36.8 (04-10-25 @ 14:02)  HR: 94 (04-11-25 @ 13:02) (68 - 100)  BP: 155/79 (04-11-25 @ 13:02) (121/75 - 166/85)  RR: 18 (04-11-25 @ 13:02) (13 - 24)  SpO2: 97% (04-11-25 @ 13:02) (94% - 100%)  Wt(kg): --    < from: US Duplex Venous Lower Ext Complete, Bilateral (04.10.25 @ 11:14) >  IMPRESSION:  No evidence of deep venous thrombosis in either lower extremity.    < end of copied text >    < from: MR Head w/wo IV Cont (04.10.25 @ 02:03) >  IMPRESSION:  2 cm hemorrhage within the mid to posterior chloe with   punctate hemorrhage centrally which may reflect a focal cavernoma.  Mild   chronic periventricular white matter ischemia.    < end of copied text >  < from: CT Head No Cont (04.10.25 @ 01:16) >  IMPRESSION: 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    < end of copied text >      ----------------------------------------------------------------------------------------  PHYSICAL EXAM  Constitutional - NAD, Comfortable  HEENT - NCAT, EOMI  Chest - Breathing comfortably, No wheezing  Cardiovascular - S1S2   Abdomen - Soft   Extremities - No C/C/E, No calf tenderness   Neurologic Exam -                    Cognitive - AAO to self, place, date, year, situation     Communication - Fluent, No dysarthria     Cranial Nerves - left beating nystagmus, otherwise CN 2-12 intact     Motor -                    LEFT    UE - ShAB 5/5, EF 5/5, EE 5/5, WE 5/5,  5/5                    RIGHT UE - ShAB 5/5, EF 4/5, EE 4/5, WE 5/5,  5/5                    LEFT    LE - HF 5/5, KE 5/5, DF 5/5, PF 5/5                    RIGHT LE - HF 5/5, KE 5/5, DF 5/5, PF 5/5        Sensory - Intact to LT     Reflexes - DTR Intact, No primitive reflexive     Coordination - FTN intact  Psychiatric - Mood stable, Affect WNL  ----------------------------------------------------------------------------------------  ASSESSMENT/PLAN  74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 control BP, cont neuro checks  HTN - permissive normotension, BP should be controlled on PO meds before dc to rehab.   Pain - Tylenol  DVT PPX - SCDs, lovenox   Rehab -   Patient continues to require hospitalization for the above diagnoses and ongoing active management of comorbid complications ( tele monitoring, neurologic monitoring ) that are substantially posing a threat to bodily function, functional ability and quality of life.     RECOMMEND - OOB DAILY    Reviewed PT evals. Pending OT eval, pending speech/swallow re-evaluation. Would continue to follow for progress as patient could benefit from AR.      Will continue to follow. Rehab recommendations are dependent on how functional progress changes as well as how patient continues to participate and tolerate therapeutic interventions, WHICH MAY CHANGE UPON FOLLOW UP EXAMINATIONS. Recommend ongoing mobilization by staff to maintain cardiopulmonary function and prevention of secondary complications related to debility/immobility.     Total Time Spent on Encounter (reviewing clinical notes, labs, radiology and medications, reviewing patient history, physical exam, assessment and discussing rehabilitation options - with consideration of prior level of function, expected level of recovery and return to community living, rounding with team) - 56 minutes

## 2025-04-11 NOTE — OCCUPATIONAL THERAPY INITIAL EVALUATION ADULT - DIAGNOSIS, OT EVAL
74 year old Female with PMH Raynaud's and HTN presented to [facility] c/o visual changes, R sided numbness, and dizziness with double vision to L eye. Neurosurgery consulted for CTH with acute 2 x 1.5cm chloe IPH. Pt transferred to [facility] for Neurosurgical ICU admission and further management.

## 2025-04-11 NOTE — DIETITIAN INITIAL EVALUATION ADULT - ORAL INTAKE PTA/DIET HISTORY
Pt reports eating well PTA; has been ~98lbs for many years (per pt). Pt denies hx of difficulty chewing or swallowing. Pt had swallow evaluation; per SLP pt not safe for PO at this time. Pt aware of NPO status.

## 2025-04-11 NOTE — OCCUPATIONAL THERAPY INITIAL EVALUATION ADULT - IMPAIRMENTS CONTRIBUTING IMPAIRED BED MOBILITY, REHAB EVAL
c/o dizziness, double vision and light sensitivity/impaired balance/impaired postural control/decreased strength

## 2025-04-11 NOTE — OCCUPATIONAL THERAPY INITIAL EVALUATION ADULT - GENERAL OBSERVATIONS, REHAB EVAL
Left pt as received, supine in bed, NAD +IV, +Tele//BP on RA A&Ox4. Pre/post pain 0/10. Pt c/o L eye double vision, pt given eye patch and instructed on use. Pt agreeable to OT evaluation

## 2025-04-12 LAB
ANION GAP SERPL CALC-SCNC: 20 MMOL/L — HIGH (ref 5–17)
BUN SERPL-MCNC: 18.7 MG/DL — SIGNIFICANT CHANGE UP (ref 8–20)
CALCIUM SERPL-MCNC: 9.2 MG/DL — SIGNIFICANT CHANGE UP (ref 8.4–10.5)
CHLORIDE SERPL-SCNC: 106 MMOL/L — SIGNIFICANT CHANGE UP (ref 96–108)
CO2 SERPL-SCNC: 16 MMOL/L — LOW (ref 22–29)
CREAT SERPL-MCNC: 0.55 MG/DL — SIGNIFICANT CHANGE UP (ref 0.5–1.3)
EGFR: 96 ML/MIN/1.73M2 — SIGNIFICANT CHANGE UP
EGFR: 96 ML/MIN/1.73M2 — SIGNIFICANT CHANGE UP
GLUCOSE BLDC GLUCOMTR-MCNC: 100 MG/DL — HIGH (ref 70–99)
GLUCOSE BLDC GLUCOMTR-MCNC: 132 MG/DL — HIGH (ref 70–99)
GLUCOSE BLDC GLUCOMTR-MCNC: 59 MG/DL — LOW (ref 70–99)
GLUCOSE BLDC GLUCOMTR-MCNC: 63 MG/DL — LOW (ref 70–99)
GLUCOSE BLDC GLUCOMTR-MCNC: 64 MG/DL — LOW (ref 70–99)
GLUCOSE BLDC GLUCOMTR-MCNC: 68 MG/DL — LOW (ref 70–99)
GLUCOSE BLDC GLUCOMTR-MCNC: 81 MG/DL — SIGNIFICANT CHANGE UP (ref 70–99)
GLUCOSE BLDC GLUCOMTR-MCNC: 91 MG/DL — SIGNIFICANT CHANGE UP (ref 70–99)
GLUCOSE SERPL-MCNC: 97 MG/DL — SIGNIFICANT CHANGE UP (ref 70–99)
HCT VFR BLD CALC: 44.3 % — SIGNIFICANT CHANGE UP (ref 34.5–45)
HGB BLD-MCNC: 14.5 G/DL — SIGNIFICANT CHANGE UP (ref 11.5–15.5)
MAGNESIUM SERPL-MCNC: 1.9 MG/DL — SIGNIFICANT CHANGE UP (ref 1.6–2.6)
MCHC RBC-ENTMCNC: 30.4 PG — SIGNIFICANT CHANGE UP (ref 27–34)
MCHC RBC-ENTMCNC: 32.7 G/DL — SIGNIFICANT CHANGE UP (ref 32–36)
MCV RBC AUTO: 92.9 FL — SIGNIFICANT CHANGE UP (ref 80–100)
NRBC # BLD AUTO: 0 K/UL — SIGNIFICANT CHANGE UP (ref 0–0)
NRBC # FLD: 0 K/UL — SIGNIFICANT CHANGE UP (ref 0–0)
NRBC BLD AUTO-RTO: 0 /100 WBCS — SIGNIFICANT CHANGE UP (ref 0–0)
PHOSPHATE SERPL-MCNC: 3.1 MG/DL — SIGNIFICANT CHANGE UP (ref 2.4–4.7)
PLATELET # BLD AUTO: 234 K/UL — SIGNIFICANT CHANGE UP (ref 150–400)
PMV BLD: 10.6 FL — SIGNIFICANT CHANGE UP (ref 7–13)
POTASSIUM SERPL-MCNC: 4 MMOL/L — SIGNIFICANT CHANGE UP (ref 3.5–5.3)
POTASSIUM SERPL-SCNC: 4 MMOL/L — SIGNIFICANT CHANGE UP (ref 3.5–5.3)
RBC # BLD: 4.77 M/UL — SIGNIFICANT CHANGE UP (ref 3.8–5.2)
RBC # FLD: 15.1 % — HIGH (ref 10.3–14.5)
SODIUM SERPL-SCNC: 142 MMOL/L — SIGNIFICANT CHANGE UP (ref 135–145)
WBC # BLD: 8.22 K/UL — SIGNIFICANT CHANGE UP (ref 3.8–10.5)
WBC # FLD AUTO: 8.22 K/UL — SIGNIFICANT CHANGE UP (ref 3.8–10.5)

## 2025-04-12 PROCEDURE — 99233 SBSQ HOSP IP/OBS HIGH 50: CPT

## 2025-04-12 RX ORDER — SODIUM CHLORIDE 9 G/1000ML
1000 INJECTION, SOLUTION INTRAVENOUS
Refills: 0 | Status: DISCONTINUED | OUTPATIENT
Start: 2025-04-12 | End: 2025-04-12

## 2025-04-12 RX ORDER — TAMSULOSIN HYDROCHLORIDE 0.4 MG/1
0.4 CAPSULE ORAL AT BEDTIME
Refills: 0 | Status: DISCONTINUED | OUTPATIENT
Start: 2025-04-12 | End: 2025-04-18

## 2025-04-12 RX ORDER — SODIUM CHLORIDE 9 G/1000ML
1000 INJECTION, SOLUTION INTRAVENOUS
Refills: 0 | Status: DISCONTINUED | OUTPATIENT
Start: 2025-04-12 | End: 2025-04-14

## 2025-04-12 RX ORDER — DEXTROSE 50 % IN WATER 50 %
12.5 SYRINGE (ML) INTRAVENOUS ONCE
Refills: 0 | Status: COMPLETED | OUTPATIENT
Start: 2025-04-12 | End: 2025-04-12

## 2025-04-12 RX ADMIN — TAMSULOSIN HYDROCHLORIDE 0.4 MILLIGRAM(S): 0.4 CAPSULE ORAL at 22:13

## 2025-04-12 RX ADMIN — Medication 12.5 MILLILITER(S): at 05:58

## 2025-04-12 RX ADMIN — ENOXAPARIN SODIUM 30 MILLIGRAM(S): 100 INJECTION SUBCUTANEOUS at 22:15

## 2025-04-12 RX ADMIN — Medication 12.5 MILLILITER(S): at 00:56

## 2025-04-12 RX ADMIN — Medication 10 MILLIGRAM(S): at 09:42

## 2025-04-12 RX ADMIN — Medication 10 MILLIGRAM(S): at 08:32

## 2025-04-12 RX ADMIN — Medication 40 MILLIGRAM(S): at 11:43

## 2025-04-12 RX ADMIN — SODIUM CHLORIDE 60 MILLILITER(S): 9 INJECTION, SOLUTION INTRAVENOUS at 05:58

## 2025-04-12 RX ADMIN — Medication 1 APPLICATION(S): at 05:58

## 2025-04-12 NOTE — PROGRESS NOTE ADULT - SUBJECTIVE AND OBJECTIVE BOX
Patient seen and examined . Denies n/v, voiding but retaining urine , this am PVR - 625 ml , denies sob/chest pain , last BM 3 days ago .   Denies n/v, NPO due to dysphagia , on ivf .     CC : dysphagia  , retaining urine       MEDICATIONS  (STANDING):  chlorhexidine 2% Cloths 1 Application(s) Topical daily  dextrose 5% + sodium chloride 0.9%. 1000 milliLiter(s) (60 mL/Hr) IV Continuous <Continuous>  enoxaparin Injectable 30 milliGRAM(s) SubCutaneous every 24 hours  influenza  Vaccine (HIGH DOSE) 0.5 milliLiter(s) IntraMuscular once  pantoprazole  Injectable 40 milliGRAM(s) IV Push daily    MEDICATIONS  (PRN):  bisacodyl Suppository 10 milliGRAM(s) Rectal daily PRN Constipation  hydrALAZINE Injectable 10 milliGRAM(s) IV Push every 2 hours PRN SBP> 160  labetalol Injectable 10 milliGRAM(s) IV Push every 2 hours PRN SBP> 160      LABS:                          14.5   8.22  )-----------( 234      ( 12 Apr 2025 06:46 )             44.3     04-12    142  |  106  |  18.7  ----------------------------<  97  4.0   |  16.0[L]  |  0.55    Ca    9.2      12 Apr 2025 06:46  Phos  3.1     04-12  Mg     1.9     04-12  A1C with Estimated Average Glucose (04.09.25 @ 22:30)    A1C with Estimated Average Glucose Result: 5.4 %   Estimated Average Glucose: 108 mg/dL    Thyroid Stimulating Hormone, Serum (04.09.25 @ 22:30)    Thyroid Stimulating Hormone, Serum: 1.37 uIU/mL      RADIOLOGY & ADDITIONAL TESTS:    < from: US Duplex Venous Lower Ext Complete, Bilateral (04.10.25 @ 11:14) >    ACC: [id number] EXAM:  US DPLX LWR EXT VEINS COMPL BI   ORDERED BY: OLYA VELAZQUEZ     PROCEDURE DATE:  04/10/2025      < end of copied text >  < from: US Duplex Venous Lower Ext Complete, Bilateral (04.10.25 @ 11:14) >    IMPRESSION:  No evidence of deep venous thrombosis in either lower extremity.    --- End of Report ---      < from: Xray Chest 1 View- PORTABLE-Urgent (04.09.25 @ 19:33) >    ACC: [id number] EXAM:  XR CHEST PORTABLE URGENT 1V   ORDERED BY: KRISTIN MARTINEZ     PROCEDURE DATE:  04/09/2025      < end of copied text >  < from: CT Angio Neck Stroke Protocol w/ IV Cont (04.09.25 @ 19:15) >    ACC: [id number] EXAM:  CT ANGIO BRAIN STROKE PROTC IC   ORDERED BY: KRISTIN MARTINEZ     ACC: [id number] EXAM:  CT ANGIO NECK STROKE PROTCL IC   ORDERED BY: KRISTIN MARTINEZ     ACC: [id number] EXAM:  CT BRAIN STROKE PROTOCOL   ORDERED BY: KRISTIN MARTINEZ     ACC: [id number] EXAM:  CT BRAIN PERFUSION MAPS STROKE   ORDERED BY: KRISTIN MARTINEZ     PROCEDURE DATE:  04/09/2025      < end of copied text >  < from: CT Angio Neck Stroke Protocol w/ IV Cont (04.09.25 @ 19:15) >    IMPRESSION:    CT HEAD:    Acute hemorrhage in the chloe, 2 x 1.5 cm, and located both centrally and   left dorsally at nuclei of CNs 6 and 7. Mild edema surrounds it, but   without hydrocephalus or major mass effect.    Finding above discussed with Dr. Martinez at 7:07 PM on 4/9/2025.    CT PERFUSION:  No deficit reported.    CTA INTRACRANIAL:  No arterial steno-occlusive disease or evidence of aneurysm. Source of   bleed not elucidated, and favored to be hypertensive.    CTA EXTRACRANIAL:  No arterial steno-occlusive disease or evidence of dissection.    --- Endof Report ---    < end of copied text >  < from: Xray Chest 1 View- PORTABLE-Urgent (04.09.25 @ 19:33) >    IMPRESSION:  Interstitial lung disease without large airspace consolidation or   effusion.    --- End of Report ---    < from: CT Head No Cont (04.10.25 @ 01:16) >    ACC: [id number] EXAM:  CT BRAIN   ORDERED BY: OLYA VELAZQUEZ     PROCEDURE DATE:  04/10/2025      < end of copied text >  < from: CT Head No Cont (04.10.25 @ 01:16) >  IMPRESSION: 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    --- End of Report ---    < from: 12 Lead ECG (04.10.25 @ 08:18) >    Ventricular Rate 66 BPM    Atrial Rate 66 BPM    P-R Interval 146 ms    QRS Duration 80 ms    Q-T Interval 464 ms    QTC Calculation(Bazett) 486 ms    P Axis 75 degrees    R Axis 16 degrees    T Axis 230 degrees    Diagnosis Line Normal sinus rhythm  Left ventricular hypertrophy with repolarization abnormality  T wave abnormality, consider anterolateral ischemia  Prolonged QT  Abnormal ECG    Confirmed by Abdiel Jain MD ([id number]) on 4/10/2025 10:24:41 AM    < end of copied text >      < from: TTE W or WO Ultrasound Enhancing Agent (04.11.25 @ 09:37) >    TRANSTHORACIC ECHOCARDIOGRAM REPORT  ________________________________________________________________________________                                      _______       Pt. Name:       JANET BUSTILLOS Study Date:    4/11/2025  MRN:            [id number]      YOB: 1950  Accession #:    [id number]     Age:           74 years  Account#:       [id number]    Gender:        F  Heart Rate:                   Height:        59.84 in (152.00 cm)  Rhythm:                       Weight:        99.21 lb (45.00 kg)  Blood Pressure: 156/81 mmHg   BSA/BMI:       1.38 m² / 19.48 kg/m²    < end of copied text >  < from: TTE W or WO Ultrasound Enhancing Agent (04.11.25 @ 09:37) >  CONCLUSIONS:      1. Technically difficult image quality.   2. Left ventricular cavity is normal in size. Left ventricular systolic function is hyperdynamic with an ejection fraction visually estimated at 70 to 75 %. There are no regional wall motion abnormalities seen.   3. There is normal LV mass and normal geometry.   4. Normal left ventricular diastolic function.   5. Normal right ventricular cavity size and normal right ventricular systolic function. Tricuspid annular plane systolic excursion (TAPSE) is 1.8 cm (normal >=1.7 cm).   6. Left atrium is normal as determined by visual assessment.   7. The right atrium is normal in size.   8. Indeterminate saline contrast injection results due to poor image quality. There is negative contrast in the RA during bubble study.   9. There is calcification of the mitral valve annulus.  10. Mild mitral valve leaflet calcification.  11. There is mild calcification of the aortic valve leaflets. Mild to moderate aortic stenosis.  12. The peak transaortic velocity is 2.09 m/s, peak transaortic gradient is 17.5 mmHg and mean transaortic gradient is 8.0 mmHg with an LVOT/aortic valve VTI ratio of 0.58. The effective orifice area is estimated at 1.47 cm² by the continuity equation.  13. Small pericardial effusion noted adjacent to the right ventricle with no echocardiographic evidence of tamponade physiology. Organized fibrinous vs coagulant material is seen in the pericardial space.  14. Estimated pulmonary artery systolic pressure is 8 mmHg.    < end of copied text >          REVIEW OF SYSTEMS:    All systems are reviewed and are negative .       I&O's Summary    11 Apr 2025 07:01  -  12 Apr 2025 07:00  --------------------------------------------------------  IN: 1060 mL / OUT: 1585 mL / NET: -525 mL          Vital Signs Last 24 Hrs  T(C): 36.5 (12 Apr 2025 04:04), Max: 36.7 (11 Apr 2025 15:08)  T(F): 97.7 (12 Apr 2025 04:04), Max: 98 (11 Apr 2025 15:08)  HR: 97 (12 Apr 2025 10:00) (85 - 100)  BP: 153/71 (12 Apr 2025 10:00) (111/65 - 192/96)  BP(mean): 95 (12 Apr 2025 10:00) (76 - 123)  RR: 16 (12 Apr 2025 10:00) (15 - 20)  SpO2: 99% (12 Apr 2025 10:00) (97% - 100%)    Parameters below as of 12 Apr 2025 10:00  Patient On (Oxygen Delivery Method): room air      PHYSICAL EXAM:    GENERAL: NAD, well-groomed, well-developed  HEAD:  Atraumatic, Normocephalic  EYES: EOMI, PERRLA, conjunctiva and sclera clear  NECK: Supple, No JVD, Normal thyroid  NERVOUS SYSTEM:  Alert & Oriented X4, no focal deficit  CHEST/LUNG: CTA b/l ,  no  rales, rhonchi, wheezing, or rubs  HEART: Regular rate and rhythm; murmur 2/6 systolic , no  rubs, or gallops  ABDOMEN: Soft, Nontender, Nondistended; Bowel sounds present  EXTREMITIES:  2+ Peripheral Pulses, No clubbing, cyanosis, or edema  LYMPH: No lymphadenopathy noted  SKIN: No rashes or lesions    CAPILLARY BLOOD GLUCOSE      POCT Blood Glucose.: 81 mg/dL (12 Apr 2025 06:15)  POCT Blood Glucose.: 64 mg/dL (12 Apr 2025 05:12)  POCT Blood Glucose.: 68 mg/dL (12 Apr 2025 05:10)  POCT Blood Glucose.: 91 mg/dL (12 Apr 2025 01:17)  POCT Blood Glucose.: 63 mg/dL (12 Apr 2025 00:35)  POCT Blood Glucose.: 59 mg/dL (12 Apr 2025 00:31)

## 2025-04-12 NOTE — SBIRT NOTE ADULT - NSSBIRTALCPOSREINDET_GEN_A_CORE
Patient reports she has 1-2 glasses of wine 5 days a week. Pt denied concerns with drinking use and declined resources.

## 2025-04-12 NOTE — PROVIDER CONTACT NOTE (HYPOGLYCEMIA EVENT) - NS PROVIDER CONTACT BACKGROUND-HYPO
Age: 74y    Gender: Female    POCT Blood Glucose:  63 mg/dL (04-12-25 @ 00:35)  59 mg/dL (04-12-25 @ 00:31)      eMAR:dextrose 50% Injectable   12.5 milliLiter(s) IV Push (04-12-25 @ 00:56)    
Age: 74y    Gender: Female    POCT Blood Glucose:  64 mg/dL (04-12-25 @ 05:12)  68 mg/dL (04-12-25 @ 05:10)  91 mg/dL (04-12-25 @ 01:17)  63 mg/dL (04-12-25 @ 00:35)  59 mg/dL (04-12-25 @ 00:31)      eMAR:dextrose 50% Injectable   12.5 milliLiter(s) IV Push (04-12-25 @ 00:56)    dextrose 50% Injectable   12.5 milliLiter(s) IV Push (04-12-25 @ 05:58)

## 2025-04-12 NOTE — PROVIDER CONTACT NOTE (OTHER) - RECOMMENDATIONS
Neurosurg team aware of repeat results. No further action treatment ordered at this time. Will continue Accucheck Q6 and D5 and 0.9% at 60mL/hr for NPO status.

## 2025-04-12 NOTE — PROVIDER CONTACT NOTE (OTHER) - ACTION/TREATMENT ORDERED:
Neurosurg PA aware of repeat results. No action treatment ordered at this time. Will continue Accucheck Q6 for NPO status.

## 2025-04-12 NOTE — PROVIDER CONTACT NOTE (HYPOGLYCEMIA EVENT) - NS PROVIDER CONTACT NOTE-TREATMENT-HYPO
Addended by: SARAH MITCHELL on: 6/9/2023 12:01 PM     Modules accepted: Orders    
dextrose 50% Injectable   12.5 milliLiter(s) IV Push/Other (Specify)
Other (Specify)

## 2025-04-12 NOTE — PROGRESS NOTE ADULT - ASSESSMENT
75 y/o Female with PMH Raynaud's and HTN presented to [facility] c/o visual changes, R sided numbness, and dizziness. Neurosurgery consulted for CTH with acute 2 x 1.5cm chloe IPH. Patient transferred to [facility] for Neurosurgical ICU admission and further management.    Intraparenchymal Hemorrhage  - CTH with acute 2 x 1.5cm chloe IPH, 04/09  - Repeat CTH, stable no change, 04/10  - Aspiration Precautions  - Seizure Precautions  - c/w PT/OT recs  - Activity increased as tolerated  - Neurosurgery Recs appreciated:  - Downgrade to Neurosurgery Step down unit  - neuro checks as per NS   - HOB at 30 degrees, neck midline position  - Stroke neurology consult  - PRN Tylenol 975mg q6hr   - Stroke patient education    Dysphagia  - c/w NPO  - failed bedside dysphagia screen, to repeat 04/14, if fails again consider NGT with tube feeds or GOC conversation.  - c/w NS Maintenance Infusion and monitor electrolytes daily    Urinary retention  - OOB to chair  - bladder scan - this am  ml   - Straight cath per Nursing protocol.   - consider to add Dulcolax suppository - BM goal daily   - consider to add Flomax     Hx of Raynauds      Hx of HTN- was on Lisinopril which she discontinued on her own 2 yrs ago as BP was on lower side ,   never f/u with her PCP since   - Maintain SBPs per Neurosx  - c/w Hydralazine 10mg / Labetalol 10mg IV Q2H for SBP > 160  - Will recommend to start Lisinopril 10 mg daily and hold for SBP < 110 once able to tolerate PO   - TTE - noted , EF - 70%-75% , Mild to moderate aortic stenosis.  - EKG noted T wave abnormality, consider anterolateral ischemia,   patient is asymptomatic , will recommend CARDIOLOGY EVAL AS ON OUTPATIENT TO R/O ISCHEMIA     Constipation  last BM 4/9  CONTINUE  DULCOLAX SUPPOSITORY WHILE  NPO   - consider adding enema if above does not work    Hypoglycemia- this am F/S - 64, repeated 81 , agree with D5 ivf     Metabolic acidosis - CO2 - noted 16 - will recommend to increase ivf to 100 ml ,   f/u BMP in am , if still with mA will consider iv bicarb      PPX  - SCD's  - Hold AC until cleared by Dr. Farmer.    Will follow along with you .  75 y/o Female with PMH Raynaud's and HTN presented to [facility] c/o visual changes, R sided numbness, and dizziness. Neurosurgery consulted for CTH with acute 2 x 1.5cm chloe IPH. Patient transferred to [facility] for Neurosurgical ICU admission and further management.    Intraparenchymal Hemorrhage  - CTH with acute 2 x 1.5cm chloe IPH, 04/09  - Repeat CTH, stable no change, 04/10  - Aspiration Precautions  - Seizure Precautions  - c/w PT/OT recs  - Activity increased as tolerated  - Neurosurgery Recs appreciated:  - Downgrade to Neurosurgery Step down unit  - neuro checks as per NS   - HOB at 30 degrees, neck midline position  - Stroke neurology consult  - PRN Tylenol 975mg q6hr   - Stroke patient education    Dysphagia  - c/w NPO  - failed bedside dysphagia screen, pending re eval , if fails again consider NGT with tube feeds or GOC conversation.  - c/w NS Maintenance Infusion and monitor electrolytes daily    Urinary retention  - OOB to chair  - bladder scan - this am  ml   - Straight cath per Nursing protocol.   - consider to add Dulcolax suppository - BM goal daily   - consider to add Flomax once tolerates PO     Hx of Raynauds      Hx of HTN- was on Lisinopril which she discontinued on her own 2 yrs ago as BP was on lower side ,   never f/u with her PCP since   - Maintain SBPs per Neurosx  - c/w Hydralazine 10mg / Labetalol 10mg IV Q2H for SBP > 160  - Will recommend to start Lisinopril 10 mg daily and hold for SBP < 110 once able to tolerate PO   - TTE - noted , EF - 70%-75% , Mild to moderate aortic stenosis.  - EKG noted T wave abnormality, consider anterolateral ischemia,   patient is asymptomatic , will recommend CARDIOLOGY EVAL AS ON OUTPATIENT TO R/O ISCHEMIA     Constipation  last BM 4/9  CONTINUE  DULCOLAX SUPPOSITORY WHILE  NPO   - consider adding enema if above does not work    Hypoglycemia- this am F/S - 64, repeated 81 , agree with D5 ivf     Metabolic acidosis - CO2 - noted 16 - will recommend to increase ivf to 100 ml ,   f/u BMP in am , if still with mA will consider iv bicarb      PPX  - SCD's  - Hold AC until cleared by Dr. Farmer.    Will follow along with you .

## 2025-04-12 NOTE — PROGRESS NOTE ADULT - SUBJECTIVE AND OBJECTIVE BOX
Liz COX Note  HD#3    HPI:  Patient is a 74-year-old female PMH Raynaud's HTN (off BP medication x 2 years) presents as a transfer from [facility] for ICH. Patient states that she woke up this morning around 7AM with no issues, then around 10:30AM she was brushing her teeth when she had a sudden onset of dizziness, nausea, episode of vomiting and laid down on her bed. Her son found her at 2:30PM laying down while she was still complaining of dizziness, and also was experiencing mouth numbness, right sided numbness and double vision. She was brought to [facility] via ambulance and on arrival she was hypertensive to 's requiring multiple pushes of IV medications and ultimately required a Cardene drip. 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 She was transferred to [facility] for neurosurgical evaluation. On arrival patient complaining of dizziness, right hand and foot decreased sensation, and double vision. Patient denies any AC/AP use and denies any recent trauma. Neurosurgery consulted and patient will be admitted to NSICU for close monitoring.    mRs: 0  ICH score: 1  NIHSS on Arrival: 3 (09 Apr 2025 21:51)      Interval/Overnight Events  Patient seen bedside on rounds. Still not advanced on diet, NPO. Refusing NGT. Had discussion this morning with attending bedside about her choices being PEG or less optimally IV nutrition with PICC line. Patient also can refuse all offered means of nutrition and workup, was counseled on risks of aspiration, PNA and even death.  Liz COX Note  HD#3    HPI:  Patient is a 74-year-old female PMH Raynaud's HTN (off BP medication x 2 years) presents as a transfer from [facility] for ICH. Patient states that she woke up this morning around 7AM with no issues, then around 10:30AM she was brushing her teeth when she had a sudden onset of dizziness, nausea, episode of vomiting and laid down on her bed. Her son found her at 2:30PM laying down while she was still complaining of dizziness, and also was experiencing mouth numbness, right sided numbness and double vision. She was brought to [facility] via ambulance and on arrival she was hypertensive to 's requiring multiple pushes of IV medications and ultimately required a Cardene drip. 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 She was transferred to [facility] for neurosurgical evaluation. On arrival patient complaining of dizziness, right hand and foot decreased sensation, and double vision. Patient denies any AC/AP use and denies any recent trauma. Neurosurgery consulted and patient will be admitted to NSICU for close monitoring.    mRs: 0  ICH score: 1  NIHSS on Arrival: 3 (09 Apr 2025 21:51)      Interval/Overnight Events  Patient seen bedside on rounds. Still not advanced on diet, NPO. Refusing NGT. Had discussion this morning with attending bedside about her choices being PEG or less optimally IV nutrition with PICC line. Patient also can refuse all offered means of nutrition and workup, was counseled on risks of aspiration, PNA and even death. Repeat Swallow evaluation ordered for  Monday morning as patient and attending both would like acute rehab which isn't going to happen over the weekend and she would need means to be able to eat for rehab. Re-evaluation for Monday to give her more time to recover her swallowing reflex.     MEDICATIONS  (STANDING):  chlorhexidine 2% Cloths 1 Application(s) Topical daily  dextrose 5% + sodium chloride 0.9%. 1000 milliLiter(s) (60 mL/Hr) IV Continuous <Continuous>  enoxaparin Injectable 30 milliGRAM(s) SubCutaneous every 24 hours  influenza  Vaccine (HIGH DOSE) 0.5 milliLiter(s) IntraMuscular once  pantoprazole  Injectable 40 milliGRAM(s) IV Push daily    MEDICATIONS  (PRN):  bisacodyl Suppository 10 milliGRAM(s) Rectal daily PRN Constipation  hydrALAZINE Injectable 10 milliGRAM(s) IV Push every 2 hours PRN SBP> 160  labetalol Injectable 10 milliGRAM(s) IV Push every 2 hours PRN SBP> 160    Vital Signs Last 24 Hrs  T(C): 36.5 (12 Apr 2025 04:04), Max: 36.7 (11 Apr 2025 15:08)  T(F): 97.7 (12 Apr 2025 04:04), Max: 98 (11 Apr 2025 15:08)  HR: 97 (12 Apr 2025 10:00) (82 - 100)  BP: 153/71 (12 Apr 2025 10:00) (111/65 - 192/96)  BP(mean): 95 (12 Apr 2025 10:00) (76 - 123)  RR: 16 (12 Apr 2025 10:00) (15 - 20)  SpO2: 99% (12 Apr 2025 10:00) (97% - 100%)    Parameters below as of 12 Apr 2025 10:00  Patient On (Oxygen Delivery Method): room air    Neuro- Awake, alert, orientedx3  speech clear and appropriate  L eye CN 6 palsy with intermittent patching  face symmetric, tongue ML  MANN well in bed  no drift  5/5 in all extremities   sensate intact to LT                          14.5   8.22  )-----------( 234      ( 12 Apr 2025 06:46 )             44.3     04-12    142  |  106  |  18.7  ----------------------------<  97  4.0   |  16.0[L]  |  0.55    Ca    9.2      12 Apr 2025 06:46  Phos  3.1     04-12  Mg     1.9     04-12    Plan:   CW Q2 neuro checks and vitals, monitor BG levels closely due to lack of nutritional status   All meds IV at this time as needed   Repeat Swallow eval on Monday to assess for any improvement in swallowing reflex   Will start protonix as patient has a hx of clearing her throat intermittently and complains of PND  OOB as tolerated/PT   LBM 4/9- order placed for suppository, patient also not eating well, continue to observe   SCDs in bed, on DVT ppx with SQL  Pending placement to AR when diet addressed  Seen by Dr. Alfonso FRANCO ADDN 10:15am   Patient seen by Dr. Farmer who convinced patient to allow for NGT.   NGT to be placed by nursing.   Once in place, will confirm placement and have dietary consult for tube feedings.

## 2025-04-13 DIAGNOSIS — R07.9 CHEST PAIN, UNSPECIFIED: ICD-10-CM

## 2025-04-13 LAB
ANION GAP SERPL CALC-SCNC: 14 MMOL/L — SIGNIFICANT CHANGE UP (ref 5–17)
ANION GAP SERPL CALC-SCNC: 14 MMOL/L — SIGNIFICANT CHANGE UP (ref 5–17)
BUN SERPL-MCNC: 12.3 MG/DL — SIGNIFICANT CHANGE UP (ref 8–20)
BUN SERPL-MCNC: 8 MG/DL — SIGNIFICANT CHANGE UP (ref 8–20)
CALCIUM SERPL-MCNC: 8.6 MG/DL — SIGNIFICANT CHANGE UP (ref 8.4–10.5)
CALCIUM SERPL-MCNC: 8.8 MG/DL — SIGNIFICANT CHANGE UP (ref 8.4–10.5)
CHLORIDE SERPL-SCNC: 107 MMOL/L — SIGNIFICANT CHANGE UP (ref 96–108)
CHLORIDE SERPL-SCNC: 107 MMOL/L — SIGNIFICANT CHANGE UP (ref 96–108)
CO2 SERPL-SCNC: 21 MMOL/L — LOW (ref 22–29)
CO2 SERPL-SCNC: 22 MMOL/L — SIGNIFICANT CHANGE UP (ref 22–29)
CREAT SERPL-MCNC: 0.48 MG/DL — LOW (ref 0.5–1.3)
CREAT SERPL-MCNC: 0.49 MG/DL — LOW (ref 0.5–1.3)
D DIMER BLD IA.RAPID-MCNC: 177 NG/ML DDU — SIGNIFICANT CHANGE UP
EGFR: 98 ML/MIN/1.73M2 — SIGNIFICANT CHANGE UP
EGFR: 98 ML/MIN/1.73M2 — SIGNIFICANT CHANGE UP
EGFR: 99 ML/MIN/1.73M2 — SIGNIFICANT CHANGE UP
EGFR: 99 ML/MIN/1.73M2 — SIGNIFICANT CHANGE UP
GLUCOSE BLDC GLUCOMTR-MCNC: 102 MG/DL — HIGH (ref 70–99)
GLUCOSE BLDC GLUCOMTR-MCNC: 121 MG/DL — HIGH (ref 70–99)
GLUCOSE BLDC GLUCOMTR-MCNC: 131 MG/DL — HIGH (ref 70–99)
GLUCOSE SERPL-MCNC: 123 MG/DL — HIGH (ref 70–99)
GLUCOSE SERPL-MCNC: 131 MG/DL — HIGH (ref 70–99)
HCT VFR BLD CALC: 39.1 % — SIGNIFICANT CHANGE UP (ref 34.5–45)
HCT VFR BLD CALC: 41 % — SIGNIFICANT CHANGE UP (ref 34.5–45)
HGB BLD-MCNC: 13 G/DL — SIGNIFICANT CHANGE UP (ref 11.5–15.5)
HGB BLD-MCNC: 13.6 G/DL — SIGNIFICANT CHANGE UP (ref 11.5–15.5)
MAGNESIUM SERPL-MCNC: 1.6 MG/DL — LOW (ref 1.8–2.6)
MAGNESIUM SERPL-MCNC: 1.9 MG/DL — SIGNIFICANT CHANGE UP (ref 1.6–2.6)
MCHC RBC-ENTMCNC: 30.1 PG — SIGNIFICANT CHANGE UP (ref 27–34)
MCHC RBC-ENTMCNC: 30.4 PG — SIGNIFICANT CHANGE UP (ref 27–34)
MCHC RBC-ENTMCNC: 33.2 G/DL — SIGNIFICANT CHANGE UP (ref 32–36)
MCHC RBC-ENTMCNC: 33.2 G/DL — SIGNIFICANT CHANGE UP (ref 32–36)
MCV RBC AUTO: 90.7 FL — SIGNIFICANT CHANGE UP (ref 80–100)
MCV RBC AUTO: 91.6 FL — SIGNIFICANT CHANGE UP (ref 80–100)
NRBC # BLD AUTO: 0 K/UL — SIGNIFICANT CHANGE UP (ref 0–0)
NRBC # BLD AUTO: 0 K/UL — SIGNIFICANT CHANGE UP (ref 0–0)
NRBC # FLD: 0 K/UL — SIGNIFICANT CHANGE UP (ref 0–0)
NRBC # FLD: 0 K/UL — SIGNIFICANT CHANGE UP (ref 0–0)
NRBC BLD AUTO-RTO: 0 /100 WBCS — SIGNIFICANT CHANGE UP (ref 0–0)
NRBC BLD AUTO-RTO: 0 /100 WBCS — SIGNIFICANT CHANGE UP (ref 0–0)
PHOSPHATE SERPL-MCNC: 1.9 MG/DL — LOW (ref 2.4–4.7)
PHOSPHATE SERPL-MCNC: 3.3 MG/DL — SIGNIFICANT CHANGE UP (ref 2.4–4.7)
PLATELET # BLD AUTO: 243 K/UL — SIGNIFICANT CHANGE UP (ref 150–400)
PLATELET # BLD AUTO: 267 K/UL — SIGNIFICANT CHANGE UP (ref 150–400)
PMV BLD: 10.6 FL — SIGNIFICANT CHANGE UP (ref 7–13)
PMV BLD: 10.9 FL — SIGNIFICANT CHANGE UP (ref 7–13)
POTASSIUM SERPL-MCNC: 3.2 MMOL/L — LOW (ref 3.5–5.3)
POTASSIUM SERPL-MCNC: 3.5 MMOL/L — SIGNIFICANT CHANGE UP (ref 3.5–5.3)
POTASSIUM SERPL-SCNC: 3.2 MMOL/L — LOW (ref 3.5–5.3)
POTASSIUM SERPL-SCNC: 3.5 MMOL/L — SIGNIFICANT CHANGE UP (ref 3.5–5.3)
RBC # BLD: 4.27 M/UL — SIGNIFICANT CHANGE UP (ref 3.8–5.2)
RBC # BLD: 4.52 M/UL — SIGNIFICANT CHANGE UP (ref 3.8–5.2)
RBC # FLD: 15.1 % — HIGH (ref 10.3–14.5)
RBC # FLD: 15.1 % — HIGH (ref 10.3–14.5)
SODIUM SERPL-SCNC: 142 MMOL/L — SIGNIFICANT CHANGE UP (ref 135–145)
SODIUM SERPL-SCNC: 143 MMOL/L — SIGNIFICANT CHANGE UP (ref 135–145)
TROPONIN T, HIGH SENSITIVITY RESULT: 14 NG/L — SIGNIFICANT CHANGE UP (ref 0–51)
WBC # BLD: 14.19 K/UL — HIGH (ref 3.8–10.5)
WBC # BLD: 14.73 K/UL — HIGH (ref 3.8–10.5)
WBC # FLD AUTO: 14.19 K/UL — HIGH (ref 3.8–10.5)
WBC # FLD AUTO: 14.73 K/UL — HIGH (ref 3.8–10.5)

## 2025-04-13 PROCEDURE — 71045 X-RAY EXAM CHEST 1 VIEW: CPT | Mod: 26

## 2025-04-13 PROCEDURE — 99233 SBSQ HOSP IP/OBS HIGH 50: CPT

## 2025-04-13 PROCEDURE — 99232 SBSQ HOSP IP/OBS MODERATE 35: CPT

## 2025-04-13 PROCEDURE — 93010 ELECTROCARDIOGRAM REPORT: CPT

## 2025-04-13 RX ORDER — SOD PHOS DI, MONO/K PHOS MONO 250 MG
1 TABLET ORAL ONCE
Refills: 0 | Status: DISCONTINUED | OUTPATIENT
Start: 2025-04-13 | End: 2025-04-13

## 2025-04-13 RX ORDER — SODIUM CHLORIDE 9 G/1000ML
250 INJECTION, SOLUTION INTRAVENOUS
Refills: 0 | Status: COMPLETED | OUTPATIENT
Start: 2025-04-13 | End: 2025-04-13

## 2025-04-13 RX ORDER — ACETAMINOPHEN 500 MG/5ML
675 LIQUID (ML) ORAL ONCE
Refills: 0 | Status: DISCONTINUED | OUTPATIENT
Start: 2025-04-13 | End: 2025-04-18

## 2025-04-13 RX ORDER — LISINOPRIL 5 MG/1
10 TABLET ORAL DAILY
Refills: 0 | Status: DISCONTINUED | OUTPATIENT
Start: 2025-04-13 | End: 2025-04-13

## 2025-04-13 RX ORDER — LISINOPRIL 5 MG/1
10 TABLET ORAL DAILY
Refills: 0 | Status: DISCONTINUED | OUTPATIENT
Start: 2025-04-13 | End: 2025-04-15

## 2025-04-13 RX ORDER — SODIUM CHLORIDE 9 G/1000ML
1000 INJECTION, SOLUTION INTRAVENOUS
Refills: 0 | Status: DISCONTINUED | OUTPATIENT
Start: 2025-04-13 | End: 2025-04-13

## 2025-04-13 RX ORDER — POTASSIUM PHOSPHATE, MONOBASIC POTASSIUM PHOSPHATE, DIBASIC INJECTION, 236; 224 MG/ML; MG/ML
30 SOLUTION, CONCENTRATE INTRAVENOUS ONCE
Refills: 0 | Status: COMPLETED | OUTPATIENT
Start: 2025-04-13 | End: 2025-04-13

## 2025-04-13 RX ORDER — MAGNESIUM SULFATE 500 MG/ML
2 SYRINGE (ML) INJECTION ONCE
Refills: 0 | Status: COMPLETED | OUTPATIENT
Start: 2025-04-13 | End: 2025-04-13

## 2025-04-13 RX ADMIN — TAMSULOSIN HYDROCHLORIDE 0.4 MILLIGRAM(S): 0.4 CAPSULE ORAL at 21:18

## 2025-04-13 RX ADMIN — Medication 100 MILLIEQUIVALENT(S): at 10:57

## 2025-04-13 RX ADMIN — Medication 10 MILLIGRAM(S): at 06:09

## 2025-04-13 RX ADMIN — SODIUM CHLORIDE 100 MILLILITER(S): 9 INJECTION, SOLUTION INTRAVENOUS at 23:22

## 2025-04-13 RX ADMIN — ENOXAPARIN SODIUM 30 MILLIGRAM(S): 100 INJECTION SUBCUTANEOUS at 21:17

## 2025-04-13 RX ADMIN — Medication 40 MILLIGRAM(S): at 13:50

## 2025-04-13 RX ADMIN — Medication 100 MILLIEQUIVALENT(S): at 09:41

## 2025-04-13 RX ADMIN — POTASSIUM PHOSPHATE, MONOBASIC POTASSIUM PHOSPHATE, DIBASIC INJECTION, 83.33 MILLIMOLE(S): 236; 224 SOLUTION, CONCENTRATE INTRAVENOUS at 23:24

## 2025-04-13 RX ADMIN — SODIUM CHLORIDE 500 MILLILITER(S): 9 INJECTION, SOLUTION INTRAVENOUS at 19:49

## 2025-04-13 RX ADMIN — SODIUM CHLORIDE 100 MILLILITER(S): 9 INJECTION, SOLUTION INTRAVENOUS at 16:12

## 2025-04-13 RX ADMIN — Medication 85 MILLIMOLE(S): at 09:41

## 2025-04-13 RX ADMIN — Medication 10 MILLIGRAM(S): at 18:29

## 2025-04-13 RX ADMIN — Medication 10 MILLIGRAM(S): at 00:15

## 2025-04-13 RX ADMIN — Medication 25 GRAM(S): at 08:36

## 2025-04-13 RX ADMIN — Medication 100 MILLIEQUIVALENT(S): at 08:36

## 2025-04-13 RX ADMIN — LABETALOL HYDROCHLORIDE 10 MILLIGRAM(S): 200 TABLET, FILM COATED ORAL at 19:05

## 2025-04-13 RX ADMIN — Medication 1 APPLICATION(S): at 06:12

## 2025-04-13 NOTE — PROGRESS NOTE ADULT - ASSESSMENT
74-year-old female PMH Raynaud's HTN (off BP medication x 2 years) presents as a transfer from [facility] for ICH.    PLAN    - Q2 neuro checks and vitals, monitor BG levels closely due to lack of nutritional status   - All meds IV at this time as needed   - Electrolytes repleted this AM, will continue to monitor  - Repeat Swallow eval today to assess for any improvement in swallowing reflex   - Will start protonix as patient has a hx of clearing her throat intermittently and complains of PND  - Medicine reccs appreciated, will start lisinopril once pt can tolerate PO  - OOB as tolerated/PT   - LBM 4/9- order placed for suppository, patient also not eating well, continue to observe   - SCDs in bed, on DVT ppx with SQL  - Pending placement to AR when diet addressed  - Discussed with Dr. Hamilton 74-year-old female PMH Raynaud's HTN (off BP medication x 2 years) presents as a transfer from [facility] for ICH.    PLAN  - Q2 neuro checks and vitals, monitor BG levels closely due to lack of nutritional status   - All meds IV at this time as needed   - Electrolytes repleted this AM, will continue to monitor  - Repeat Swallow eval today to assess for any improvement in swallowing reflex   - Will start protonix as patient has a hx of clearing her throat intermittently and complains of PND  - Medicine reccs appreciated, will start lisinopril once pt can tolerate PO  - OOB as tolerated/PT   - LBM 4/9- order placed for suppository, patient also not eating well, continue to observe   - SCDs in bed, on DVT ppx with SQL  - Pending placement to AR when diet addressed  - Discussed with Dr. Hamilton

## 2025-04-13 NOTE — CONSULT NOTE ADULT - NS ATTEND AMEND GEN_ALL_CORE FT
Patient was seen and examined at bedside with no chest pain but notes that overnight had episode of sharp pain, non radiating with some shortness of breath but at that time the blood pressure was elevated Patient was seen and examined at bedside with no chest pain but notes that overnight had episode of sharp pain, non radiating with some shortness of breath but at that time the blood pressure was elevated at the time the she was having chest pain   HS trops normal with TTE done shows normal LVEF with no significant valvulopathy   Patient based on acute hemorrhagic stroke not an ideal candidate for NST at this time and would defer to the outpatient setting   Blood pressure goal as per Neuro     will follow up in the outpatient setting     Keira Conley D.O. [facility]  Cardiology/Vascular Cardiology -[facility]   Telephone # [phone]

## 2025-04-13 NOTE — PROGRESS NOTE ADULT - SUBJECTIVE AND OBJECTIVE BOX
HPI: 74-year-old female PMH Raynaud's HTN (off BP medication x 2 years) presents as a transfer from [facility] for ICH. Patient states that she woke up this morning around 7AM with no issues, then around 10:30AM she was brushing her teeth when she had a sudden onset of dizziness, nausea, episode of vomiting and laid down on her bed. Her son found her at 2:30PM laying down while she was still complaining of dizziness, and also was experiencing mouth numbness, right sided numbness and double vision. She was brought to [facility] via ambulance and on arrival she was hypertensive to 's requiring multiple pushes of IV medications and ultimately required a Cardene drip. 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 She was transferred to [facility] for neurosurgical evaluation. On arrival patient complaining of dizziness, right hand and foot decreased sensation, and double vision. Patient denies any AC/AP use and denies any recent trauma. Neurosurgery consulted and patient will be admitted to NSICU for close monitoring.    INTERVAL HPI/OVERNIGHT EVENTS:  74y Female s/p seen lying comfortably in bed. Pureed Diet. Needs BM. Odell in with clear urine. Pending S&S reeval today to try and pass for full PO diet. Needs diet in order to go to AIR.    Vital Signs Last 24 Hrs  T(C): 36.4 (13 Apr 2025 08:00), Max: 36.8 (12 Apr 2025 19:13)  T(F): 97.5 (13 Apr 2025 08:00), Max: 98.3 (13 Apr 2025 00:02)  HR: 79 (13 Apr 2025 08:01) (78 - 106)  BP: 121/66 (13 Apr 2025 08:01) (121/66 - 180/81)  BP(mean): 84 (13 Apr 2025 08:01) (79 - 111)  RR: 20 (13 Apr 2025 08:01) (16 - 20)  SpO2: 98% (13 Apr 2025 08:01) (96% - 99%)    Parameters below as of 13 Apr 2025 08:01  Patient On (Oxygen Delivery Method): room air    PHYSICAL EXAM:  GENERAL: NAD, well-groomed  HEAD:  Atraumatic, normocephalic  RAJWINDER COMA SCORE: E- V- M- = 15  MENTAL STATUS: AAO x3; Awake; Opens eyes spontaneously; Appropriately conversant without aphasia; following simple commands  CRANIAL NERVES: L eye CN 6 palsy with intermittent patching PERRL. Face symmetric w/ normal eye closure and smile, tongue midline. Hearing grossly intact. Speech clear. Head turning and shoulder shrug intact.   MOTOR: strength 5/5 b/l upper and lower extremities  SENSATION: grossly intact to light touch all extremities  EXTREMITIES:  no clubbing, cyanosis, or edema  SKIN: Warm, dry    LABS:                        13.0   14.73 )-----------( 243      ( 13 Apr 2025 06:12 )             39.1     04-13    142  |  107  |  12.3  ----------------------------<  123[H]  3.2[L]   |  21.0[L]  |  0.48[L]    Ca    8.8      13 Apr 2025 06:12  Phos  1.9     04-13  Mg     1.6     04-13    Urinalysis Basic - ( 13 Apr 2025 06:12 )    Color: x / Appearance: x / SG: x / pH: x  Gluc: 123 mg/dL / Ketone: x  / Bili: x / Urobili: x   Blood: x / Protein: x / Nitrite: x   Leuk Esterase: x / RBC: x / WBC x   Sq Epi: x / Non Sq Epi: x / Bacteria: x    04-12 @ 07:01  -  04-13 @ 07:00  --------------------------------------------------------  IN: 1580 mL / OUT: 800 mL / NET: 780 mL    RADIOLOGY & ADDITIONAL TESTS:    < from: MR Head w/wo IV Cont (04.10.25 @ 02:03) >  IMPRESSION:  2 cm hemorrhage within the mid to posterior chloe with   punctate hemorrhage centrally which may reflect a focal cavernoma.  Mild   chronic periventricular white matter ischemia.

## 2025-04-13 NOTE — PROGRESS NOTE ADULT - ASSESSMENT
75 y/o Female with PMH Raynaud's and HTN presented to [facility] c/o visual changes, R sided numbness, and dizziness. Neurosurgery consulted for CTH with acute 2 x 1.5cm chloe IPH. Patient transferred to [facility] for Neurosurgical ICU admission and further management.    Intraparenchymal Hemorrhage  - CTH with acute 2 x 1.5cm chloe IPH, 04/09  - Repeat CTH, stable no change, 04/10  - Aspiration Precautions  - Seizure Precautions  - c/w PT/OT recs  - Activity increased as tolerated  - Downgrade to Neurosurgery Step down unit  - neuro checks as per NS   - HOB at 30 degrees, neck midline position  - PRN Tylenol 975mg q6hr       Dysphagia  - passed  bedside dysphagia screen for puree diet, no liquids  on 4/12   - to be re eval today   - c/w NS Maintenance Infusion and monitor electrolytes daily  - Aspiration precautions       Urinary retention- continue to retain , this am PVR - 700 ml ,   - Odell placed this am 4/13  - Flomax 0.4 mg qhs started   - BM goal - daily     Hx of Raynaud's    Hx of HTN- was on Lisinopril which she discontinued on her own 2 yrs ago as BP was on lower side as per patient   never f/u with her PCP since   - Maintain SBPs per Neurosx  - c/w Hydralazine 10mg / Labetalol 10mg IV Q2H for SBP > 160  - this am SBP noted in 170's , repeated in 120's   - Will recommend to start Lisinopril 10 mg daily and hold for SBP < 110 once able to tolerate PO   - TTE - noted , EF - 70%-75% , Mild to moderate aortic stenosis.  - EKG noted T wave abnormality, consider anterolateral ischemia,     patient is asymptomatic , will recommend CARDIOLOGY EVAL AS ON OUTPATIENT TO R/O ISCHEMIA   - Cardiac monitor reviewed - NSR in 80's with non sustained short episodes of sinus tachy at 130's overnight -     continue to monitor     Last BM 4/9- patient was NPO for couple days , now on puree diet , no liquids   CONTINUE  DULCOLAX SUPPOSITORY   - consider adding enema if above does not work    Hypoglycemia- RESOLVED WITH DIET AND IVF     Metabolic acidosis - CO2 - noted 16 , today CO2 21 - RESOLVING with ivf ,   check BMP in am     Hypokalemia/ Hypomagnesemia/ Hypophosphatemia - will supplement ,   monitor electrolytes and supplement if needed     PPX  - SCD's  - Lovenox started as per NS     Will follow along with you .  75 y/o Female with PMH Raynaud's and HTN presented to [facility] c/o visual changes, R sided numbness, and dizziness. Neurosurgery consulted for CTH with acute 2 x 1.5cm chloe IPH. Patient transferred to [facility] for Neurosurgical ICU admission and further management.    Intraparenchymal Hemorrhage  - CTH with acute 2 x 1.5cm chloe IPH, 04/09  - Repeat CTH, stable no change, 04/10  - Aspiration Precautions  - Seizure Precautions  - c/w PT/OT recs  - Activity increased as tolerated  - Downgrade to Neurosurgery Step down unit  - neuro checks as per NS   - HOB at 30 degrees, neck midline position  - PRN Tylenol 975mg q6hr       Dysphagia  - passed  bedside dysphagia screen for puree diet, no liquids  on 4/12   - to be re eval today   - c/w NS Maintenance Infusion and monitor electrolytes daily  - Aspiration precautions       Urinary retention- continue to retain , this am PVR - 700 ml ,   - Odell placed this am 4/13  - Flomax 0.4 mg qhs started   - BM goal - daily     Hx of Raynaud's    Hx of HTN- was on Lisinopril which she discontinued on her own 2 yrs ago as BP was on lower side as per patient   never f/u with her PCP since   - Maintain SBPs per Neurosx  - c/w Hydralazine 10mg / Labetalol 10mg IV Q2H for SBP > 160  - this am SBP noted in 170's , repeated in 120's   - Will recommend to start Lisinopril 10 mg daily and hold for SBP < 110 once able to tolerate PO   - TTE - noted , EF - 70%-75% , Mild to moderate aortic stenosis.  - EKG noted T wave abnormality, consider anterolateral ischemia,     patient is asymptomatic , will recommend CARDIOLOGY EVAL AS ON OUTPATIENT TO R/O ISCHEMIA   - Cardiac monitor reviewed - NSR in 80's with non sustained short episodes of sinus tachy at 130's overnight -     continue to monitor     Last BM 4/9- patient was NPO for couple days , now on puree diet , no liquids   CONTINUE  DULCOLAX SUPPOSITORY   - consider adding enema if above does not work    Hypoglycemia- RESOLVED WITH DIET AND IVF     Metabolic acidosis - CO2 - noted 16 , today CO2 21 - RESOLVING with ivf ,   check BMP in am     Leucocytosis - WBC noted trending up from 8 to 14 , afebrile   trend fever /wbc , if still with leucocytosis in am will consider CXR to r/o aspiration PNA     Hypokalemia/ Hypomagnesemia/ Hypophosphatemia - will supplement ,   monitor electrolytes and supplement if needed     PPX  - SCD's  - Lovenox started as per NS     Will follow along with you .

## 2025-04-13 NOTE — CHART NOTE - NSCHARTNOTEFT_GEN_A_CORE
Rapid response called for patient for left sided chest pain and HTN. BP was 190 given her BP meds and upon arrival . Patient complaining of sharp pain to her left breast/chest area.     ICU Vital Signs Last 24 Hrs  T(C): 36.6 (13 Apr 2025 15:17), Max: 36.8 (13 Apr 2025 00:02)  T(F): 97.9 (13 Apr 2025 15:17), Max: 98.3 (13 Apr 2025 00:02)  HR: 106 (13 Apr 2025 18:56) (78 - 106)  BP: 166/76 (13 Apr 2025 18:56) (118/57 - 190/95)  BP(mean): 99 (13 Apr 2025 18:56) (74 - 122)  ABP: --  ABP(mean): --  RR: 16 (13 Apr 2025 18:24) (15 - 22)  SpO2: 100% (13 Apr 2025 18:56) (96% - 100%)    O2 Parameters below as of 13 Apr 2025 18:56  Patient On (Oxygen Delivery Method): room air      MEDICATIONS  (STANDING):  chlorhexidine 2% Cloths 1 Application(s) Topical daily  dextrose 5% + sodium chloride 0.9%. 1000 milliLiter(s) (100 mL/Hr) IV Continuous <Continuous>  enoxaparin Injectable 30 milliGRAM(s) SubCutaneous every 24 hours  influenza  Vaccine (HIGH DOSE) 0.5 milliLiter(s) IntraMuscular once  lisinopril 10 milliGRAM(s) Oral daily  pantoprazole  Injectable 40 milliGRAM(s) IV Push daily  tamsulosin 0.4 milliGRAM(s) Oral at bedtime    MEDICATIONS  (PRN):  bisacodyl Suppository 10 milliGRAM(s) Rectal daily PRN Constipation  hydrALAZINE Injectable 10 milliGRAM(s) IV Push every 2 hours PRN SBP> 160  labetalol Injectable 10 milliGRAM(s) IV Push every 2 hours PRN SBP> 160    Physical exam:     awake alert oriented x3 PERRL, EOMI  Lungs cta b/l  heart s1 s2 tachycardic to 109 but no murmurs appreciated. EKG Sinus rhythm  Abd soft not tender not distended  ext warm and well perfused  pulses palpable 2+    Ordered repeat labs, cardiac enzymes, d-dimer, cbc, lactic acid, bmp, blood cx.   ekg reviewed and compared at bedside similar to EKG from 4/9/2025  IV tylenol given  labetalol 10mg ivp given and BP improved to sbp 106, then up to sbp 120  patient is anxious but states pain improving.   no focal neurological changes  will have cardiology evaluate.     will inform Dr. Farmer Rapid response called for patient for left sided chest pain and HTN. BP was 190 given her BP meds and upon arrival . Patient complaining of sharp pain to her left breast/chest area.     ICU Vital Signs Last 24 Hrs  T(C): 36.6 (13 Apr 2025 15:17), Max: 36.8 (13 Apr 2025 00:02)  T(F): 97.9 (13 Apr 2025 15:17), Max: 98.3 (13 Apr 2025 00:02)  HR: 106 (13 Apr 2025 18:56) (78 - 106)  BP: 166/76 (13 Apr 2025 18:56) (118/57 - 190/95)  BP(mean): 99 (13 Apr 2025 18:56) (74 - 122)  ABP: --  ABP(mean): --  RR: 16 (13 Apr 2025 18:24) (15 - 22)  SpO2: 100% (13 Apr 2025 18:56) (96% - 100%)    O2 Parameters below as of 13 Apr 2025 18:56  Patient On (Oxygen Delivery Method): room air      MEDICATIONS  (STANDING):  chlorhexidine 2% Cloths 1 Application(s) Topical daily  dextrose 5% + sodium chloride 0.9%. 1000 milliLiter(s) (100 mL/Hr) IV Continuous <Continuous>  enoxaparin Injectable 30 milliGRAM(s) SubCutaneous every 24 hours  influenza  Vaccine (HIGH DOSE) 0.5 milliLiter(s) IntraMuscular once  lisinopril 10 milliGRAM(s) Oral daily  pantoprazole  Injectable 40 milliGRAM(s) IV Push daily  tamsulosin 0.4 milliGRAM(s) Oral at bedtime    MEDICATIONS  (PRN):  bisacodyl Suppository 10 milliGRAM(s) Rectal daily PRN Constipation  hydrALAZINE Injectable 10 milliGRAM(s) IV Push every 2 hours PRN SBP> 160  labetalol Injectable 10 milliGRAM(s) IV Push every 2 hours PRN SBP> 160    Physical exam:     awake alert oriented x3 PERRL, left CN VI palsy.   Lungs cta b/l  heart s1 s2 tachycardic to 109 but no murmurs appreciated. EKG Sinus rhythm  Abd soft not tender not distended  ext warm and well perfused  pulses palpable 2+    Ordered repeat labs, cardiac enzymes, d-dimer, cbc, lactic acid, bmp, blood cx.   ekg reviewed and compared at bedside similar to EKG from 4/9/2025  IV tylenol given  labetalol 10mg ivp given and BP improved to sbp 106, then up to sbp 120  patient is anxious but states pain improving.   no focal neurological changes  will have cardiology evaluate.     will inform Dr. Farmer

## 2025-04-13 NOTE — CONSULT NOTE ADULT - PROBLEM SELECTOR RECOMMENDATION 9
-possibly related to hypertensive urgency, symptoms improved with BP control  -telemetry monitoring  -trend troponin  -EKG with TWI but unchanged since 4/9  -recent echocardiogram showed no WMA  -will defer ischemic evaluation for now as antiplatelets are contraindicated due to acute IPH

## 2025-04-13 NOTE — CONSULT NOTE ADULT - SUBJECTIVE AND OBJECTIVE BOX
[facility]                                              CARDIOLOGY AT [place] [address]                                             Telephone: [phone]. Fax:[phone]                                                       CARDIOLOGY CONSULTATION NOTE                                                                                             History obtained by: Patient and medical record  Community Cardiologist: n/a   obtained: Yes [  ] No [ x ]  Reason for Consultation: chest pain  Available out pt records reviewed: Yes [ x ] No [  ]    Chief complaint:    Patient is a 74y old  Female who presents with a chief complaint of Pontine hemorrhage (13 Apr 2025 10:13)      HPI:  This is 73 y/o female with hx of untreated HTN and Raynaud's who presented to [facility] with visual changes and right-sided weakness. Pt was found to have 2x 1.5 cm chloe IPH and is now transferred to [facility] for neurosurgical evaluation and monitoring. This evening after dinner pt experienced left-sided chest pain radiating to the neck, denies nausea or diaphoresis. She was found to have /95. Pt was treated with IV Labetalol and IV Tylenol with improvement of symptoms. EKG shows SR with TWI in anterolateral leads. Troponin 14. Echocardiogram on 4/11 showed LVEF 70-75% with no wall motion abnormalities, mild to moderate AS and small pericardial effusion. Pt does not have a cardiologist.      CARDIAC TESTING   ECHO:  < from: TTE W or WO Ultrasound Enhancing Agent (04.11.25 @ 09:37) >  CONCLUSIONS:      1. Technically difficult image quality.   2. Left ventricular cavity is normal in size. Left ventricular systolic function is hyperdynamic with an ejection fraction visually estimated at 70 to 75 %. There are no regional wall motion abnormalities seen.   3. There is normal LV mass and normal geometry.   4. Normal left ventricular diastolic function.   5. Normal right ventricular cavity size and normal right ventricular systolic function. Tricuspid annular plane systolic excursion (TAPSE) is 1.8 cm (normal >=1.7 cm).   6. Left atrium is normal as determined by visual assessment.   7. The right atrium is normal in size.   8. Indeterminate saline contrast injection results due to poor image quality. There is negative contrast in the RA during bubble study.   9. There is calcification of the mitral valve annulus.  10. Mild mitral valve leaflet calcification.  11. There is mild calcification of the aortic valve leaflets. Mild to moderate aortic stenosis.  12. The peak transaortic velocity is 2.09 m/s, peak transaortic gradient is 17.5 mmHg and mean transaortic gradient is 8.0 mmHg with an LVOT/aortic valve VTI ratio of 0.58. The effective orifice area is estimated at 1.47 cm² by the continuity equation.  13. Small pericardial effusion noted adjacent to the right ventricle with no echocardiographic evidence of tamponade physiology. Organized fibrinous vs coagulant material is seen in the pericardial space.  14. Estimated pulmonary artery systolic pressure is 8 mmHg.    < end of copied text >      STRESS: n/a    CATH: n/a    ELECTROPHYSIOLOGY: n/a    PAST MEDICAL HISTORY  HTN (hypertension)        PAST SURGICAL HISTORY  S/P lumpectomy, left breast        SOCIAL HISTORY:  CIGARETTES:     ALCOHOL:  DRUGS:    FAMILY HISTORY:    Family History of Cardiovascular Disease:  Yes [  ] No [  ]  Coronary Artery Disease in first degree relative: Yes [  ] No [  ]  Sudden Cardiac Death in First degree relative: Yes [  ] No [  ]    HOME MEDICATIONS:  cholecalciferol 25 mcg (1000 intl units) oral capsule: 1 cap(s) orally once a day (10 Apr 2025 13:38)  cyanocobalamin: 1,000 microgram(s) orally once a day (10 Apr 2025 13:38)      CURRENT CARDIAC MEDICATIONS:  hydrALAZINE Injectable 10 milliGRAM(s) IV Push every 2 hours PRN SBP> 160  labetalol Injectable 10 milliGRAM(s) IV Push every 2 hours PRN SBP> 160  lisinopril 10 milliGRAM(s) Oral daily      CURRENT OTHER MEDICATIONS:  acetaminophen   IVPB .. 675 milliGRAM(s) IV Intermittent once, Stop order after: 1 Doses  bisacodyl Suppository 10 milliGRAM(s) Rectal daily PRN Constipation  pantoprazole  Injectable 40 milliGRAM(s) IV Push daily  chlorhexidine 2% Cloths 1 Application(s) Topical daily  dextrose 5% + sodium chloride 0.9%. 1000 milliLiter(s) (100 mL/Hr) IV Continuous <Continuous>  enoxaparin Injectable 30 milliGRAM(s) SubCutaneous every 24 hours  influenza  Vaccine (HIGH DOSE) 0.5 milliLiter(s) IntraMuscular once  tamsulosin 0.4 milliGRAM(s) Oral at bedtime      ALLERGIES:   No Known Allergies      REVIEW OF SYMPTOMS:   CONSTITUTIONAL: No fever, no chills, no weight loss, no weight gain, no fatigue   ENMT:  No vertigo; No sinus or throat pain  NECK: No pain or stiffness  CARDIOVASCULAR: as per HPI  RESPIRATORY: no Shortness of breath, no cough, no wheezing  : No dysuria, no hematuria   GI: No dark color stool, no nausea, no diarrhea, no constipation, no abdominal pain   NEURO: No headache, no slurred speech   MUSCULOSKELETAL: No joint pain or swelling; No muscle, back, or extremity pain  PSYCH: No agitation, no anxiety.    ALL OTHER REVIEW OF SYSTEMS ARE NEGATIVE.    VITAL SIGNS:  T(C): 37.2 (04-13-25 @ 20:04), Max: 37.2 (04-13-25 @ 20:04)  T(F): 99 (04-13-25 @ 20:04), Max: 99 (04-13-25 @ 20:04)  HR: 79 (04-13-25 @ 20:00) (77 - 106)  BP: 127/65 (04-13-25 @ 20:00) (105/66 - 190/95)  RR: 18 (04-13-25 @ 20:00) (15 - 22)  SpO2: 99% (04-13-25 @ 20:00) (96% - 100%)    INTAKE AND OUTPUT:     04-12 @ 07:01  -  04-13 @ 07:00  --------------------------------------------------------  IN: 1580 mL / OUT: 800 mL / NET: 780 mL    04-13 @ 07:01  -  04-13 @ 21:44  --------------------------------------------------------  IN: 1948 mL / OUT: 1025 mL / NET: 923 mL        PHYSICAL EXAM:  Constitutional:  No acute distress.   HEENT: Atraumatic and normocephalic , neck is supple . no JVD. No carotid bruit.  CNS: A&Ox3. No focal deficits.   Respiratory: CTAB, unlabored   Cardiovascular: RRR normal s1 s2. 2/6 systolic murmur  Gastrointestinal: Soft, non-tender. +Bowel sounds.   Extremities: 2+ Peripheral Pulses, No clubbing, cyanosis, or edema  Psychiatric: Calm . no agitation.   Skin: Warm and dry, no ulcers on extremities     LABS:                            13.6   14.19 )-----------( 267      ( 13 Apr 2025 19:00 )             41.0     04-13    143  |  107  |  8.0  ----------------------------<  131[H]  3.5   |  22.0  |  0.49[L]    Ca    8.6      13 Apr 2025 19:00  Phos  3.3     04-13  Mg     1.9     04-13        Urinalysis Basic - ( 13 Apr 2025 19:00 )    Color: x / Appearance: x / SG: x / pH: x  Gluc: 131 mg/dL / Ketone: x  / Bili: x / Urobili: x   Blood: x / Protein: x / Nitrite: x   Leuk Esterase: x / RBC: x / WBC x   Sq Epi: x / Non Sq Epi: x / Bacteria: x      INTERPRETATION OF TELEMETRY: SR    ECG: SR 79 bpm, TWI in anterolateral leads  Prior ECG: Yes [x  ] No [  ]    RADIOLOGY & ADDITIONAL STUDIES:    X-ray:    CT scan:   < from: CT Head No Cont (04.10.25 @ 01:16) >  IMPRESSION: 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    --- End of Report ---      CARLOS ENRIQUE MD; Attending Radiologist  This document has been electronically signed. Apr 10 2025  6:43AM    < end of copied text >    MRI:   US:

## 2025-04-13 NOTE — CONSULT NOTE ADULT - ASSESSMENT
This is 73 y/o female with hx of untreated HTN and Raynaud's who presented to [facility] with visual changes and right-sided weakness. Pt was found to have 2x 1.5 cm chloe IPH and is now transferred to [facility] for neurosurgical evaluation and monitoring. This evening after dinner pt experienced left-sided chest pain radiating to the neck, denies nausea or diaphoresis. She was found to have /95. Pt was treated with IV Labetalol and IV Tylenol with improvement of symptoms. EKG shows SR with TWI in anterolateral leads. Troponin 14. Echocardiogram on 4/11 showed LVEF 70-75% with no wall motion abnormalities, mild to moderate AS and small pericardial effusion. Pt does not have a cardiologist.

## 2025-04-13 NOTE — PROGRESS NOTE ADULT - SUBJECTIVE AND OBJECTIVE BOX
Patient seen and examined . Comfortable , denies pain / nausea / vomiting , Odell placed earlier this am for PVRof 700 ml .   Yesterday passed S/S for puree diet , tolerating diet , last BM 3 days ago . L eye double vision .     CC : as above       MEDICATIONS  (STANDING):  chlorhexidine 2% Cloths 1 Application(s) Topical daily  dextrose 5% + sodium chloride 0.9%. 1000 milliLiter(s) (100 mL/Hr) IV Continuous <Continuous>  enoxaparin Injectable 30 milliGRAM(s) SubCutaneous every 24 hours  influenza  Vaccine (HIGH DOSE) 0.5 milliLiter(s) IntraMuscular once  pantoprazole  Injectable 40 milliGRAM(s) IV Push daily  potassium chloride  10 mEq/100 mL IVPB 10 milliEquivalent(s) IV Intermittent every 1 hour  tamsulosin 0.4 milliGRAM(s) Oral at bedtime    MEDICATIONS  (PRN):  bisacodyl Suppository 10 milliGRAM(s) Rectal daily PRN Constipation  hydrALAZINE Injectable 10 milliGRAM(s) IV Push every 2 hours PRN SBP> 160  labetalol Injectable 10 milliGRAM(s) IV Push every 2 hours PRN SBP> 160      LABS:                          13.0   14.73 )-----------( 243      ( 13 Apr 2025 06:12 )             39.1     04-13    142  |  107  |  12.3  ----------------------------<  123[H]  3.2[L]   |  21.0[L]  |  0.48[L]    Ca    8.8      13 Apr 2025 06:12  Phos  1.9     04-13  Mg     1.6     04-13  A1C with Estimated Average Glucose (04.09.25 @ 22:30)    A1C with Estimated Average Glucose Result: 5.4 %   Estimated Average Glucose: 108 mg/dL    Thyroid Stimulating Hormone, Serum (04.09.25 @ 22:30)    Thyroid Stimulating Hormone, Serum: 1.37 uIU/mL        RADIOLOGY & ADDITIONAL TESTS:    < from: US Duplex Venous Lower Ext Complete, Bilateral (04.10.25 @ 11:14) >    ACC: [id number] EXAM:  US DPLX LWR EXT VEINS COMPL BI   ORDERED BY: OLYA VELAZQUEZ     PROCEDURE DATE:  04/10/2025      < end of copied text >  < from: US Duplex Venous Lower Ext Complete, Bilateral (04.10.25 @ 11:14) >    IMPRESSION:  No evidence of deep venous thrombosis in either lower extremity.    --- End of Report ---    < from: MR Head w/wo IV Cont (04.10.25 @ 02:03) >    ACC: [id number] EXAM:  MR BRAIN WAW IC   ORDERED BY: OLYA VELAZQUEZ     PROCEDURE DATE:  04/10/2025        < end of copied text >  < from: MR Head w/wo IV Cont (04.10.25 @ 02:03) >  IMPRESSION:  2 cm hemorrhage within the mid to posterior chloe with   punctate hemorrhage centrally which may reflect a focal cavernoma.  Mild   chronic periventricular white matter ischemia.    --- End of Report ---    < end of copied text >        REVIEW OF SYSTEMS:    CONSTITUTIONAL: No fever, weight loss, or fatigue  EYES: No eye pain, visual disturbances, or discharge  ENMT:  No difficulty hearing, tinnitus, vertigo; No sinus or throat pain  NECK: No pain or stiffness  RESPIRATORY: No cough, wheezing, chills or hemoptysis; No shortness of breath  CARDIOVASCULAR: No chest pain, palpitations, dizziness, or leg swelling  GASTROINTESTINAL: No abdominal or epigastric pain. No nausea, vomiting, or hematemesis; No diarrhea or constipation. No melena or hematochezia.  GENITOURINARY: No dysuria, frequency, hematuria, or incontinence  NEUROLOGICAL: No headaches, memory loss, loss of strength, numbness, or tremors  SKIN: No itching, burning, rashes, or lesions   LYMPH NODES: No enlarged glands  ENDOCRINE: No heat or cold intolerance; No hair loss  MUSCULOSKELETAL:   PSYCHIATRIC: No depression, anxiety, mood swings, or difficulty sleeping  HEME/LYMPH: No easy bruising, or bleeding gums  ALLERGY AND IMMUNOLOGIC: No hives or eczema  I&O's Summary    12 Apr 2025 07:01  -  13 Apr 2025 07:00  --------------------------------------------------------  IN: 1580 mL / OUT: 800 mL / NET: 780 mL          Vital Signs Last 24 Hrs  T(C): 36.4 (13 Apr 2025 08:00), Max: 36.8 (12 Apr 2025 19:13)  T(F): 97.5 (13 Apr 2025 08:00), Max: 98.3 (13 Apr 2025 00:02)  HR: 79 (13 Apr 2025 08:01) (78 - 106)  BP: 121/66 (13 Apr 2025 08:01) (121/66 - 180/81)  BP(mean): 84 (13 Apr 2025 08:01) (79 - 111)  RR: 20 (13 Apr 2025 08:01) (16 - 20)  SpO2: 98% (13 Apr 2025 08:01) (96% - 99%)    Parameters below as of 13 Apr 2025 08:01  Patient On (Oxygen Delivery Method): room air      PHYSICAL EXAM:    GENERAL: NAD,   HEAD:  Atraumatic, Normocephalic  EYES: EOMI, PERRLA, conjunctiva and sclera clear, L  eye CN 6 palsy with intermittent patching   NECK: Supple, No JVD, Normal thyroid  NERVOUS SYSTEM:  Alert & Oriented X4, no focal deficit except L eye CN 6 palsy with double vision   CHEST/LUNG: CTA b/l ,  no  rales, rhonchi, wheezing, or rubs  HEART: Regular rate and rhythm; No murmurs, rubs, or gallops  ABDOMEN: Soft, Nontender, Nondistended; Bowel sounds present  EXTREMITIES:  2+ Peripheral Pulses, No clubbing, cyanosis, or edema  LYMPH: No lymphadenopathy noted  SKIN: No rashes or lesions    CAPILLARY BLOOD GLUCOSE      POCT Blood Glucose.: 121 mg/dL (13 Apr 2025 08:53)  POCT Blood Glucose.: 132 mg/dL (12 Apr 2025 17:45)  POCT Blood Glucose.: 100 mg/dL (12 Apr 2025 11:42)     Patient seen and examined . Comfortable , denies pain / nausea / vomiting , Odell placed earlier this am for PVR of 700 ml .   Yesterday passed S/S for puree diet , tolerating diet , last BM 3 days ago . L eye double vision .     CC : as above       MEDICATIONS  (STANDING):  chlorhexidine 2% Cloths 1 Application(s) Topical daily  dextrose 5% + sodium chloride 0.9%. 1000 milliLiter(s) (100 mL/Hr) IV Continuous <Continuous>  enoxaparin Injectable 30 milliGRAM(s) SubCutaneous every 24 hours  influenza  Vaccine (HIGH DOSE) 0.5 milliLiter(s) IntraMuscular once  pantoprazole  Injectable 40 milliGRAM(s) IV Push daily  potassium chloride  10 mEq/100 mL IVPB 10 milliEquivalent(s) IV Intermittent every 1 hour  tamsulosin 0.4 milliGRAM(s) Oral at bedtime    MEDICATIONS  (PRN):  bisacodyl Suppository 10 milliGRAM(s) Rectal daily PRN Constipation  hydrALAZINE Injectable 10 milliGRAM(s) IV Push every 2 hours PRN SBP> 160  labetalol Injectable 10 milliGRAM(s) IV Push every 2 hours PRN SBP> 160      LABS:                          13.0   14.73 )-----------( 243      ( 13 Apr 2025 06:12 )             39.1     04-13    142  |  107  |  12.3  ----------------------------<  123[H]  3.2[L]   |  21.0[L]  |  0.48[L]    Ca    8.8      13 Apr 2025 06:12  Phos  1.9     04-13  Mg     1.6     04-13  A1C with Estimated Average Glucose (04.09.25 @ 22:30)    A1C with Estimated Average Glucose Result: 5.4 %   Estimated Average Glucose: 108 mg/dL    Thyroid Stimulating Hormone, Serum (04.09.25 @ 22:30)    Thyroid Stimulating Hormone, Serum: 1.37 uIU/mL        RADIOLOGY & ADDITIONAL TESTS:    < from: US Duplex Venous Lower Ext Complete, Bilateral (04.10.25 @ 11:14) >    ACC: [id number] EXAM:  US DPLX LWR EXT VEINS COMPL BI   ORDERED BY: OLYA VELAZQUEZ     PROCEDURE DATE:  04/10/2025      < end of copied text >  < from: US Duplex Venous Lower Ext Complete, Bilateral (04.10.25 @ 11:14) >    IMPRESSION:  No evidence of deep venous thrombosis in either lower extremity.    --- End of Report ---    < from: MR Head w/wo IV Cont (04.10.25 @ 02:03) >    ACC: [id number] EXAM:  MR BRAIN WAW IC   ORDERED BY: OLYA VELAZQUEZ     PROCEDURE DATE:  04/10/2025        < end of copied text >  < from: MR Head w/wo IV Cont (04.10.25 @ 02:03) >  IMPRESSION:  2 cm hemorrhage within the mid to posterior chloe with   punctate hemorrhage centrally which may reflect a focal cavernoma.  Mild   chronic periventricular white matter ischemia.    --- End of Report ---    < end of copied text >        REVIEW OF SYSTEMS:    As above , all other systems are reviewed and are negative .     I&O's Summary    12 Apr 2025 07:01  -  13 Apr 2025 07:00  --------------------------------------------------------  IN: 1580 mL / OUT: 800 mL / NET: 780 mL          Vital Signs Last 24 Hrs  T(C): 36.4 (13 Apr 2025 08:00), Max: 36.8 (12 Apr 2025 19:13)  T(F): 97.5 (13 Apr 2025 08:00), Max: 98.3 (13 Apr 2025 00:02)  HR: 79 (13 Apr 2025 08:01) (78 - 106)  BP: 121/66 (13 Apr 2025 08:01) (121/66 - 180/81)  BP(mean): 84 (13 Apr 2025 08:01) (79 - 111)  RR: 20 (13 Apr 2025 08:01) (16 - 20)  SpO2: 98% (13 Apr 2025 08:01) (96% - 99%)    Parameters below as of 13 Apr 2025 08:01  Patient On (Oxygen Delivery Method): room air      PHYSICAL EXAM:    GENERAL: NAD,   HEAD:  Atraumatic, Normocephalic  EYES: EOMI, PERRLA, conjunctiva and sclera clear, L  eye CN 6 palsy with intermittent patching   NECK: Supple, No JVD, Normal thyroid  NERVOUS SYSTEM:  Alert & Oriented X4, no focal deficit except L eye CN 6 palsy with double vision   CHEST/LUNG: CTA b/l ,  no  rales, rhonchi, wheezing, or rubs  HEART: Regular rate and rhythm; No murmurs, rubs, or gallops  ABDOMEN: Soft, Nontender, Nondistended; Bowel sounds present  EXTREMITIES:  2+ Peripheral Pulses, No clubbing, cyanosis, or edema  LYMPH: No lymphadenopathy noted  SKIN: No rashes or lesions    CAPILLARY BLOOD GLUCOSE      POCT Blood Glucose.: 121 mg/dL (13 Apr 2025 08:53)  POCT Blood Glucose.: 132 mg/dL (12 Apr 2025 17:45)  POCT Blood Glucose.: 100 mg/dL (12 Apr 2025 11:42)

## 2025-04-13 NOTE — PROGRESS NOTE ADULT - NS ATTEND AMEND GEN_ALL_CORE FT
I agree with the above. I personally examined and saw the patient. Intact other than double vision. Plan as above, dispo pending swallow.
MARIAH Attg:    see above    patient seen and examined    agree with above  dysconjugate gaze with diplopia  left NC VI palsy    plan of care determined for ICH  MRI reviewed  plan per Dr. Farmer for suspected underlying cavernoma
I agree with the above. I personally examined and saw the patient. Intact other than double vision which she thinks is improving. Swallow improving, re-eval today. Dispo planning.

## 2025-04-14 LAB
ANION GAP SERPL CALC-SCNC: 16 MMOL/L — SIGNIFICANT CHANGE UP (ref 5–17)
BUN SERPL-MCNC: 7.3 MG/DL — LOW (ref 8–20)
CALCIUM SERPL-MCNC: 8.6 MG/DL — SIGNIFICANT CHANGE UP (ref 8.4–10.5)
CHLORIDE SERPL-SCNC: 104 MMOL/L — SIGNIFICANT CHANGE UP (ref 96–108)
CO2 SERPL-SCNC: 20 MMOL/L — LOW (ref 22–29)
CREAT SERPL-MCNC: 0.42 MG/DL — LOW (ref 0.5–1.3)
EGFR: 102 ML/MIN/1.73M2 — SIGNIFICANT CHANGE UP
EGFR: 102 ML/MIN/1.73M2 — SIGNIFICANT CHANGE UP
GLUCOSE SERPL-MCNC: 99 MG/DL — SIGNIFICANT CHANGE UP (ref 70–99)
HCT VFR BLD CALC: 41.1 % — SIGNIFICANT CHANGE UP (ref 34.5–45)
HGB BLD-MCNC: 13.5 G/DL — SIGNIFICANT CHANGE UP (ref 11.5–15.5)
MAGNESIUM SERPL-MCNC: 1.9 MG/DL — SIGNIFICANT CHANGE UP (ref 1.8–2.6)
MCHC RBC-ENTMCNC: 30.3 PG — SIGNIFICANT CHANGE UP (ref 27–34)
MCHC RBC-ENTMCNC: 32.8 G/DL — SIGNIFICANT CHANGE UP (ref 32–36)
MCV RBC AUTO: 92.2 FL — SIGNIFICANT CHANGE UP (ref 80–100)
NRBC # BLD AUTO: 0 K/UL — SIGNIFICANT CHANGE UP (ref 0–0)
NRBC # FLD: 0 K/UL — SIGNIFICANT CHANGE UP (ref 0–0)
NRBC BLD AUTO-RTO: 0 /100 WBCS — SIGNIFICANT CHANGE UP (ref 0–0)
PHOSPHATE SERPL-MCNC: 4.7 MG/DL — SIGNIFICANT CHANGE UP (ref 2.4–4.7)
PLATELET # BLD AUTO: 216 K/UL — SIGNIFICANT CHANGE UP (ref 150–400)
PMV BLD: 11.3 FL — SIGNIFICANT CHANGE UP (ref 7–13)
POTASSIUM SERPL-MCNC: 3.9 MMOL/L — SIGNIFICANT CHANGE UP (ref 3.5–5.3)
POTASSIUM SERPL-SCNC: 3.9 MMOL/L — SIGNIFICANT CHANGE UP (ref 3.5–5.3)
RBC # BLD: 4.46 M/UL — SIGNIFICANT CHANGE UP (ref 3.8–5.2)
RBC # FLD: 15.1 % — HIGH (ref 10.3–14.5)
SODIUM SERPL-SCNC: 140 MMOL/L — SIGNIFICANT CHANGE UP (ref 135–145)
TROPONIN T, HIGH SENSITIVITY RESULT: 14 NG/L — SIGNIFICANT CHANGE UP (ref 0–51)
WBC # BLD: 13.3 K/UL — HIGH (ref 3.8–10.5)
WBC # FLD AUTO: 13.3 K/UL — HIGH (ref 3.8–10.5)

## 2025-04-14 PROCEDURE — 74230 X-RAY XM SWLNG FUNCJ C+: CPT | Mod: 26

## 2025-04-14 PROCEDURE — 99232 SBSQ HOSP IP/OBS MODERATE 35: CPT

## 2025-04-14 PROCEDURE — 99233 SBSQ HOSP IP/OBS HIGH 50: CPT

## 2025-04-14 PROCEDURE — 99223 1ST HOSP IP/OBS HIGH 75: CPT

## 2025-04-14 RX ORDER — ACETAMINOPHEN 500 MG/5ML
650 LIQUID (ML) ORAL EVERY 6 HOURS
Refills: 0 | Status: DISCONTINUED | OUTPATIENT
Start: 2025-04-14 | End: 2025-04-18

## 2025-04-14 RX ORDER — SODIUM CHLORIDE 9 G/1000ML
1000 INJECTION, SOLUTION INTRAVENOUS
Refills: 0 | Status: DISCONTINUED | OUTPATIENT
Start: 2025-04-14 | End: 2025-04-14

## 2025-04-14 RX ADMIN — Medication 650 MILLIGRAM(S): at 05:15

## 2025-04-14 RX ADMIN — Medication 40 MILLIGRAM(S): at 16:54

## 2025-04-14 RX ADMIN — LISINOPRIL 10 MILLIGRAM(S): 5 TABLET ORAL at 05:14

## 2025-04-14 RX ADMIN — Medication 1 APPLICATION(S): at 05:14

## 2025-04-14 RX ADMIN — Medication 10 MILLIGRAM(S): at 04:17

## 2025-04-14 RX ADMIN — TAMSULOSIN HYDROCHLORIDE 0.4 MILLIGRAM(S): 0.4 CAPSULE ORAL at 20:40

## 2025-04-14 RX ADMIN — ENOXAPARIN SODIUM 30 MILLIGRAM(S): 100 INJECTION SUBCUTANEOUS at 20:40

## 2025-04-14 RX ADMIN — Medication 100 MILLILITER(S): at 16:53

## 2025-04-14 RX ADMIN — SODIUM CHLORIDE 75 MILLILITER(S): 9 INJECTION, SOLUTION INTRAVENOUS at 02:20

## 2025-04-14 NOTE — PROGRESS NOTE ADULT - SUBJECTIVE AND OBJECTIVE BOX
HPI:74-year-old female PMH Raynaud's HTN (off BP medication x 2 years) presents as a transfer from [facility] for ICH. Patient states that she woke up this morning around 7AM with no issues, then around 10:30AM she was brushing her teeth when she had a sudden onset of dizziness, nausea, episode of vomiting and laid down on her bed. Her son found her at 2:30PM laying down while she was still complaining of dizziness, and also was experiencing mouth numbness, right sided numbness and double vision. She was brought to [facility] via ambulance and on arrival she was hypertensive to 's requiring multiple pushes of IV medications and ultimately required a Cardene drip. 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 She was transferred to [facility] for neurosurgical evaluation. On arrival patient complaining of dizziness, right hand and foot decreased sensation, and double vision. Patient denies any AC/AP use and denies any recent trauma. Neurosurgery consulted and patient will be admitted to NSICU for close monitoring.    INTERVAL HPI/OVERNIGHT EVENTS:  74y Female s/p seen lying comfortably in bed. Chest pain improved from last night, EKG and labs negative. Pureed Diet. Needs BM. Odell in with clear urine. Pending MBS today to try and see cause of dysphagia.    Vital Signs Last 24 Hrs  T(C): 36.7 (14 Apr 2025 08:09), Max: 37.2 (13 Apr 2025 20:04)  T(F): 98 (14 Apr 2025 08:09), Max: 99 (13 Apr 2025 20:04)  HR: 94 (14 Apr 2025 08:00) (71 - 106)  BP: 141/73 (14 Apr 2025 08:00) (105/66 - 190/95)  BP(mean): 93 (14 Apr 2025 08:00) (74 - 122)  RR: 17 (14 Apr 2025 08:00) (15 - 22)  SpO2: 98% (14 Apr 2025 08:00) (96% - 100%)    Parameters below as of 14 Apr 2025 08:00  Patient On (Oxygen Delivery Method): room air    PHYSICAL EXAM:  GENERAL: NAD, well-groomed  HEAD:  Atraumatic, normocephalic  RAJWINDER COMA SCORE: E- V- M- = 15  MENTAL STATUS: AAO x3; Awake; Opens eyes spontaneously; Appropriately conversant without aphasia; following simple commands  CRANIAL NERVES: L eye CN 6 palsy with intermittent patching PERRL. Face symmetric w/ normal eye closure and smile, tongue midline. Hearing grossly intact. Speech clear. Head turning and shoulder shrug intact.   MOTOR: strength 5/5 b/l upper and lower extremities  SENSATION: grossly intact to light touch all extremities  EXTREMITIES:  no clubbing, cyanosis, or edema  SKIN: Warm, dry    LABS:                        13.5   13.30 )-----------( 216      ( 14 Apr 2025 05:56 )             41.1     04-14    140  |  104  |  7.3[L]  ----------------------------<  99  3.9   |  20.0[L]  |  0.42[L]    Ca    8.6      14 Apr 2025 05:56  Phos  4.7     04-14  Mg     1.9     04-14    Urinalysis Basic - ( 14 Apr 2025 05:56 )    Color: x / Appearance: x / SG: x / pH: x  Gluc: 99 mg/dL / Ketone: x  / Bili: x / Urobili: x   Blood: x / Protein: x / Nitrite: x   Leuk Esterase: x / RBC: x / WBC x   Sq Epi: x / Non Sq Epi: x / Bacteria: x    04-13 @ 07:01 - 04-14 @ 07:00  --------------------------------------------------------  IN: 2973 mL / OUT: 1525 mL / NET: 1448 mL    04-14 @ 07:01 - 04-14 @ 09:39  --------------------------------------------------------  IN: 150 mL / OUT: 0 mL / NET: 150 mL    RADIOLOGY & ADDITIONAL TESTS:    < from: MR Head w/wo IV Cont (04.10.25 @ 02:03) >  IMPRESSION:  2 cm hemorrhage within the mid to posterior chloe with   punctate hemorrhage centrally which may reflect a focal cavernoma.  Mild   chronic periventricular white matter ischemia.

## 2025-04-14 NOTE — PROGRESS NOTE ADULT - SUBJECTIVE AND OBJECTIVE BOX
CC: Patient being seen for rehabilitation follow up.  Patient feels ok, but continues to have chronic chest pain.  Does not report palpitations.     FUNCTIONAL PROGRESS  4/13 SLP  Speech Language Pathology Recommendations: 1. Initiate Puree with NO liquids AS TOLERATED. Alternative means of hydration as per MD.2. STRICT Aspiration precautions - if any overt s/s aspiration, please d/c diet and resume NPO status pendign reevaluation. 3. Oral care 4. Crush meds in puree 5. Upright with all PO, 30 min after PO intake 6. Supervision an assistance with all PO 7. Strongly consider MBSS for objective view of swallow given location of CVA and risk aspiration 8. ST to follow to reassess as schedule allows     4/11 PT  Bed Mobility  Bed Mobility Training Supine-to-Sit: minimum assist (75% patient effort);  1 person assist  Bed Mobility Training Limitations: decreased ability to use legs for bridging/pushing;  impaired ability to control trunk for mobility;  decreased strength;  impaired balance    Bed-Chair Transfer Training  Transfer Training Bed-to-Chair Transfer: minimum assist (75% patient effort);  1 person assist;  weight-bearing as tolerated   hand held assist   Transfer Training Chair-to-Bed Transfer: minimum assist (75% patient effort);  1 person assist;  weight-bearing as tolerated   hand held assist   Bed-to-Chair Transfer Training Transfer Safety Analysis: decreased balance;  decreased weight-shifting ability;  Pt transferred to/from commode;  impaired balance;  impaired postural control;  Posterior lean, Posterior LOB while standing;  hand held assist    Sit-Stand Transfer Training  Transfer Training Sit-to-Stand Transfer: minimum assist (75% patient effort);  1 person assist;  weight-bearing as tolerated   hand held assist   Transfer Training Stand-to-Sit Transfer: minimum assist (75% patient effort);  1 person assist;  weight-bearing as tolerated   hand held assist   Sit-to-Stand Transfer Training Transfer Safety Analysis: decreased balance;  decreased weight-shifting ability;  impaired balance;  decreased strength;  (+) Posterior lean/LOB     4/11 OT  Bathing Training:     · Level of [place]	moderate assist (50% patients effort)    Upper Body Dressing Training:     · Level of [place]	moderate assist (50% patients effort)    Lower Body Dressing Training:     · Level of [place]	maximum assist (25% patients effort)    Toilet Hygiene Training:     · Level of [place]	moderate assist (50% patients effort)    Grooming Training:     · Level of [place]	minimum assist (75% patients effort)    Eating/Self-Feeding Training:     · Level of [place]	Did not directly observe        VITALS  T(C): 36.7 (04-14-25 @ 08:09), Max: 37.2 (04-13-25 @ 20:04)  HR: 94 (04-14-25 @ 08:00) (71 - 106)  BP: 141/73 (04-14-25 @ 08:00) (105/66 - 190/95)  RR: 17 (04-14-25 @ 08:00) (15 - 22)  SpO2: 98% (04-14-25 @ 08:00) (96% - 100%)  Wt(kg): --    MEDICATIONS   acetaminophen     Tablet .. 650 milliGRAM(s) every 6 hours PRN  acetaminophen   IVPB .. 675 milliGRAM(s) once  bisacodyl Suppository 10 milliGRAM(s) daily PRN  chlorhexidine 2% Cloths 1 Application(s) daily  dextrose 5% + sodium chloride 0.9%. 1000 milliLiter(s) <Continuous>  enoxaparin Injectable 30 milliGRAM(s) every 24 hours  hydrALAZINE Injectable 10 milliGRAM(s) every 2 hours PRN  influenza  Vaccine (HIGH DOSE) 0.5 milliLiter(s) once  labetalol Injectable 10 milliGRAM(s) every 2 hours PRN  lisinopril 10 milliGRAM(s) daily  pantoprazole  Injectable 40 milliGRAM(s) daily  tamsulosin 0.4 milliGRAM(s) at bedtime      RECENT LABS/IMAGING  - Reviewed Today                        13.5   13.30 )-----------( 216      ( 14 Apr 2025 05:56 )             41.1     04-14    140  |  104  |  7.3[L]  ----------------------------<  99  3.9   |  20.0[L]  |  0.42[L]    Ca    8.6      14 Apr 2025 05:56  Phos  4.7     04-14  Mg     1.9     04-14        Urinalysis Basic - ( 14 Apr 2025 05:56 )    Color: x / Appearance: x / SG: x / pH: x  Gluc: 99 mg/dL / Ketone: x  / Bili: x / Urobili: x   Blood: x / Protein: x / Nitrite: x   Leuk Esterase: x / RBC: x / WBC x   Sq Epi: x / Non Sq Epi: x / Bacteria: x          BLE V DOPPLERS 4/10 - No evidence of deep venous thrombosis in either lower extremity.    MR Head 4/10 - 2 cm hemorrhage within the mid to posterior chloe with punctate hemorrhage centrally which may reflect a focal cavernoma.  Mild chronic periventricular white matter ischemia.    HEAD CT 4/10 -  No change in dorsal left pontine hemorrhage with slight mass effect on the quadrigeminal plate cistern since 4/9/2025.        ----------------------------------------------------------------------------------------  PHYSICAL EXAM  Constitutional - NAD, Comfortable   Extremities - No edema  Neurologic Exam -                    Cognitive - AAO to self, place, date, year, situation     Communication - Fluent, No dysarthria     Cranial Nerves - Left nystagmus, Left CN6 palsy     Motor - Proximal weakness, Nonfocal     Sensory - Intact to LT  Psychiatric - Mood stable, Affect WNL  ----------------------------------------------------------------------------------------  ASSESSMENT/PLAN  74yFemale with functional deficits after a left CVA  Left pontine hemorrhage -  Lisinopril   Oropharyngeal Dysphagia - Puree/NO LIQUIDS  Pain - Tylenol  DVT PPX - SCDs, Lovenox   Rehab/Impaired mobility and function - Patient continues to require hospitalization for the above diagnoses and ongoing active management of comorbid complications that are substantially posing a threat to bodily function, functional ability and quality of life.     Will continue to follow.     Patient currently NPO. Pending MBS 4/14.    Rehab recommendations are dependent on how functional progress changes as well as how patient continues to participate and tolerate therapeutic interventions, WHICH MAY CHANGE UPON FOLLOW UP EXAMINATIONS.     Recommend ongoing mobilization by staff to maintain cardiopulmonary function and prevention of secondary complications related to debility/immobility.

## 2025-04-14 NOTE — SWALLOW VFSS/MBS ASSESSMENT ADULT - DIAGNOSTIC IMPRESSIONS
Mild oral dysphagia across administered PO trials with reduced lingual strength & coordination  Pharyngeal stage noted for reduced tongue base retraction, decreased hyolarygeal excursion with trace to mild valleculae & trace posterior pharyngeal wall & pyriform sinus stasis, however overall functional  Of note, trace retrograde progression of contrast viewed to level of upper esophagus Mild oral dysphagia across administered PO trials with reduced lingual strength & coordination  Pharyngeal stage noted for reduced tongue base retraction, decreased hyolarygeal excursion with trace to mild valleculae & trace posterior pharyngeal wall & pyriform sinus stasis, however overall functional  Of note, trace retrograde progression of contrast viewed to level of upper esophagus    **Pt with cough post study: no PO observed in airway

## 2025-04-14 NOTE — SWALLOW VFSS/MBS ASSESSMENT ADULT - ESOPHAGEAL STAGE
Trace retrograde progression to upper esophagus PHARYNGEAL PHASE: cough post swallow with initial cup sip, not captured on fluoro      **Throat clear/cough noted post study:

## 2025-04-14 NOTE — PROGRESS NOTE ADULT - ASSESSMENT
73 y/o Female with PMH Raynaud's and HTN presented to [facility] c/o visual changes, R sided numbness, and dizziness. Neurosurgery consulted for CTH with acute 2 x 1.5cm chloe IPH. Patient transferred to [facility] for Neurosurgical ICU admission and further management.    Intraparenchymal Hemorrhage  - CTH with acute 2 x 1.5cm chloe IPH, 04/09  - Repeat CTH on 04/10- no changes   - Aspiration Precautions  - Seizure Precautions  - c/w PT/OT recs  - Activity increased as tolerated  - Downgrade to Neurosurgery Step down unit  - neuro checks as per NS   - HOB at 30 degrees, neck midline position  - PRN Tylenol 975mg q6hr       Dysphagia  - passed  bedside dysphagia screen for puree diet, no liquids  on 4/12   - MBS - planned   - c/w NS Maintenance Infusion and monitor electrolytes daily  - Aspiration precautions       Urinary retention  - Odell placed this am 4/13  - Flomax 0.4 mg qhs started   - BM goal - daily , last BM this am     Hx of Raynaud's    Chest pain on 4/13 , while having SBP in 180's-190's - seen and evaluated by cardio ,   Trop x2 negative , EKG without acute changes - chest pain likely due to Hypertensive urgency .   Chest pain resolved with BP control . Started on Lisinopril 10 mg daily with parameters ,   SBP goal< 160 , may increase Lisinopril to 20 mg if SBP> 160 , diet changed to low soldium diet     Hx of HTN- was on Lisinopril in the past which she discontinued on her own 2 yrs ago as BP was on lower side as per patient   never f/u with her PCP since   - Maintain SBPs per Neurosx  - c/w Hydralazine 10mg / Labetalol 10mg IV Q2H for SBP > 160  - Hypertensive urgency with SBP in 180-190's on 4/13 noted   - Lisinopril 10 mg daily started   - TTE - noted , EF - 70%-75% , Mild to moderate aortic stenosis.  - EKG noted T wave abnormality, consider anterolateral ischemia,     patient is asymptomatic , will recommend CARDIOLOGY EVAL AS ON OUTPATIENT TO R/O ISCHEMIA   - Cardiac monitor reviewed - NSR in 80's    continue to monitor     Last BM 4/14 - patient was NPO for couple days , now on puree diet , no liquids   CONTINUE  DULCOLAX SUPPOSITORY PRN     Hypoglycemia- RESOLVED WITH DIET AND IVF     Metabolic acidosis - CO2 - noted 16 , today CO2 21 - RESOLVING with ivf ,   check BMP in am     Leucocytosis - WBC noted trending up from 8 to 14 , today down to 13 k   trend fever /wbc , if WBC continue to trend up  in am will consider CXR to r/o aspiration PNA .   Patient is asymptomatic     Hypokalemia/ Hypomagnesemia/ Hypophosphatemia - supplemented   monitor electrolytes and supplement if needed     PPX  - SCD's  - Lovenox started as per NS     Will follow along with you .

## 2025-04-14 NOTE — PROGRESS NOTE ADULT - ASSESSMENT
74-year-old female PMH Raynaud's HTN (off BP medication x 2 years) presents as a transfer from [facility] for ICH.    PLAN  - Q4 neuro checks and vitals  - MBS eval today to assess for any improvement in swallowing reflex   - Medicine reccs appreciated  - Increased fluids today  - Cardiology reccs appreciated  - Chest pain improved but will continue to trend troponins  - OOB as tolerated/PT   - LBM 4/13  - SCDs in bed, on DVT ppx with SQL  - Pending placement to [facility]  - Discussed with Dr. Farmer

## 2025-04-14 NOTE — SWALLOW VFSS/MBS ASSESSMENT ADULT - ORAL PHASE
Uncontrolled bolus / spillover in dexter-pharynx Uncontrolled bolus / spillover in dexter-pharynx/Uncontrolled bolus / spillover in hypopharynx

## 2025-04-14 NOTE — SWALLOW VFSS/MBS ASSESSMENT ADULT - SLP PERTINENT HISTORY OF CURRENT PROBLEM
Pt seen at bedside this admission for dysphagia. Pt now on puree, no fluids via PO with MBS warranted to objectively assess swallow

## 2025-04-14 NOTE — SWALLOW VFSS/MBS ASSESSMENT ADULT - SLP GENERAL OBSERVATIONS
Pt received & seen seated upright via stretcher, awake/alert, oriented, on room air, PARESH Osorio present, 02 sats: 98% on monitor, 0/10 pain pre/post

## 2025-04-14 NOTE — SWALLOW VFSS/MBS ASSESSMENT ADULT - COMMENTS
As per MD note: "74-year-old female PMH Raynaud's HTN (off BP medication x 2 years) presents as a transfer from [facility] for ICH. Patient states that she woke up this morning around 7AM with no issues, then around 10:30AM she was brushing her teeth when she had a sudden onset of dizziness, nausea, episode of vomiting and laid down on her bed. Her son found her at 2:30PM laying down while she was still complaining of dizziness, and also was experiencing mouth numbness, right sided numbness and double vision. She was brought to [facility] via ambulance and on arrival she was hypertensive to 's requiring multiple pushes of IV medications and ultimately required a Cardene drip. 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 She was transferred to [facility] for neurosurgical evaluation. On arrival patient complaining of dizziness, right hand and foot decreased sensation, and double vision."

## 2025-04-15 LAB
ANION GAP SERPL CALC-SCNC: 12 MMOL/L — SIGNIFICANT CHANGE UP (ref 5–17)
BUN SERPL-MCNC: 6.8 MG/DL — LOW (ref 8–20)
CALCIUM SERPL-MCNC: 8.3 MG/DL — LOW (ref 8.4–10.5)
CHLORIDE SERPL-SCNC: 106 MMOL/L — SIGNIFICANT CHANGE UP (ref 96–108)
CO2 SERPL-SCNC: 22 MMOL/L — SIGNIFICANT CHANGE UP (ref 22–29)
CREAT SERPL-MCNC: 0.4 MG/DL — LOW (ref 0.5–1.3)
EGFR: 103 ML/MIN/1.73M2 — SIGNIFICANT CHANGE UP
EGFR: 103 ML/MIN/1.73M2 — SIGNIFICANT CHANGE UP
GLUCOSE SERPL-MCNC: 98 MG/DL — SIGNIFICANT CHANGE UP (ref 70–99)
HCT VFR BLD CALC: 35.5 % — SIGNIFICANT CHANGE UP (ref 34.5–45)
HGB BLD-MCNC: 11.8 G/DL — SIGNIFICANT CHANGE UP (ref 11.5–15.5)
MAGNESIUM SERPL-MCNC: 1.7 MG/DL — SIGNIFICANT CHANGE UP (ref 1.6–2.6)
MCHC RBC-ENTMCNC: 30.2 PG — SIGNIFICANT CHANGE UP (ref 27–34)
MCHC RBC-ENTMCNC: 33.2 G/DL — SIGNIFICANT CHANGE UP (ref 32–36)
MCV RBC AUTO: 90.8 FL — SIGNIFICANT CHANGE UP (ref 80–100)
NRBC # BLD AUTO: 0 K/UL — SIGNIFICANT CHANGE UP (ref 0–0)
NRBC # FLD: 0 K/UL — SIGNIFICANT CHANGE UP (ref 0–0)
NRBC BLD AUTO-RTO: 0 /100 WBCS — SIGNIFICANT CHANGE UP (ref 0–0)
PHOSPHATE SERPL-MCNC: 2.4 MG/DL — SIGNIFICANT CHANGE UP (ref 2.4–4.7)
PLATELET # BLD AUTO: 198 K/UL — SIGNIFICANT CHANGE UP (ref 150–400)
PMV BLD: 10.9 FL — SIGNIFICANT CHANGE UP (ref 7–13)
POTASSIUM SERPL-MCNC: 3.6 MMOL/L — SIGNIFICANT CHANGE UP (ref 3.5–5.3)
POTASSIUM SERPL-SCNC: 3.6 MMOL/L — SIGNIFICANT CHANGE UP (ref 3.5–5.3)
RBC # BLD: 3.91 M/UL — SIGNIFICANT CHANGE UP (ref 3.8–5.2)
RBC # FLD: 15 % — HIGH (ref 10.3–14.5)
SODIUM SERPL-SCNC: 140 MMOL/L — SIGNIFICANT CHANGE UP (ref 135–145)
TROPONIN T, HIGH SENSITIVITY RESULT: 13 NG/L — SIGNIFICANT CHANGE UP (ref 0–51)
WBC # BLD: 9.74 K/UL — SIGNIFICANT CHANGE UP (ref 3.8–10.5)
WBC # FLD AUTO: 9.74 K/UL — SIGNIFICANT CHANGE UP (ref 3.8–10.5)

## 2025-04-15 PROCEDURE — 99233 SBSQ HOSP IP/OBS HIGH 50: CPT

## 2025-04-15 RX ORDER — LISINOPRIL 5 MG/1
20 TABLET ORAL DAILY
Refills: 0 | Status: DISCONTINUED | OUTPATIENT
Start: 2025-04-16 | End: 2025-04-17

## 2025-04-15 RX ORDER — POLYETHYLENE GLYCOL 3350 17 G/17G
17 POWDER, FOR SOLUTION ORAL DAILY
Refills: 0 | Status: DISCONTINUED | OUTPATIENT
Start: 2025-04-15 | End: 2025-04-18

## 2025-04-15 RX ORDER — LISINOPRIL 5 MG/1
10 TABLET ORAL ONCE
Refills: 0 | Status: COMPLETED | OUTPATIENT
Start: 2025-04-15 | End: 2025-04-15

## 2025-04-15 RX ADMIN — LISINOPRIL 10 MILLIGRAM(S): 5 TABLET ORAL at 05:20

## 2025-04-15 RX ADMIN — TAMSULOSIN HYDROCHLORIDE 0.4 MILLIGRAM(S): 0.4 CAPSULE ORAL at 21:52

## 2025-04-15 RX ADMIN — Medication 10 MILLIGRAM(S): at 14:33

## 2025-04-15 RX ADMIN — LISINOPRIL 10 MILLIGRAM(S): 5 TABLET ORAL at 21:52

## 2025-04-15 RX ADMIN — Medication 10 MILLIGRAM(S): at 01:03

## 2025-04-15 RX ADMIN — Medication 40 MILLIGRAM(S): at 11:47

## 2025-04-15 RX ADMIN — Medication 1 APPLICATION(S): at 05:20

## 2025-04-15 RX ADMIN — ENOXAPARIN SODIUM 30 MILLIGRAM(S): 100 INJECTION SUBCUTANEOUS at 21:52

## 2025-04-15 RX ADMIN — POLYETHYLENE GLYCOL 3350 17 GRAM(S): 17 POWDER, FOR SOLUTION ORAL at 17:15

## 2025-04-15 RX ADMIN — Medication 100 MILLILITER(S): at 01:04

## 2025-04-15 NOTE — PROGRESS NOTE ADULT - ASSESSMENT
74-year-old female PMH Raynaud's and HTN (off BP medication x 2 years) presents as a transfer from [facility] for ICH.     PLAN  - Q4 neuro checks and vitals  - [facility] eval yesterday - pt approved for regular diet  - Medicine reccs appreciated  - Diet - Regular DASH/TLC, supervised meals  - Increased fluids today  - Cardiology recs appreciated  - Increased lisinopril from 10 to 20mg for episodes of HTN   - Chest pain improved but will continue to trend troponin - continues to be negative  - OOB as tolerated/PT   - LBM 4/13  - SCDs in bed, on DVT ppx with SQL  - Pending placement to [place]  - PM&R recs appreciated  - Discussed with Dr. Farmer

## 2025-04-15 NOTE — PROVIDER CONTACT NOTE (OTHER) - SITUATION
Pt hypertensive this shift. Remains asymptomatic. No active BP meds ordered for this date. Neurosurg notified. See new orders.
Pt has been NPO, blood glucose resulted 59 and recheck 63. Pt was given dextrose 50% IV 6.25 grams. Repeat blood glucose 91.
Pt has been NPO. Blood glucose has resulted as 68 and recheck 64. Pt was given Dextrose IVP and started on new IVF. Repeat blood glucose 81.

## 2025-04-15 NOTE — PROGRESS NOTE ADULT - ASSESSMENT
75 y/o Female with PMH Raynaud's and HTN presented to [facility] c/o visual changes, R sided numbness, and dizziness. Neurosurgery consulted for CTH with acute 2 x 1.5cm chloe IPH. Patient transferred to [facility] for Neurosurgical ICU admission and further management.    Intraparenchymal Hemorrhage  - CTH with acute 2 x 1.5cm chloe IPH, 04/09  - Repeat CTH on 04/10- no changes   - Aspiration Precautions  - Seizure Precautions  - c/w PT/OT recs  - Downgrade to Neurosurgery Step down unit  - neuro checks as per NS   - HOB at 30 degrees, neck midline position  - PRN Tylenol 975mg q6hr       Dysphagia, MBS done - diet advanced to regular with fluids   - Aspiration precautions       Urinary retention  - Odell placed this am 4/13  - Flomax 0.4 mg qhs   - BM goal - daily , last BM on 4/14  - TOV in 3-5 days     Hx of Raynaud's    Chest pain on 4/13 , while having SBP in 180's-190's - seen and evaluated by cardio ,   Trop x2 negative , EKG without acute changes - chest pain likely due to Hypertensive urgency .   Chest pain resolved with BP control . Started on Lisinopril 10 mg daily with parameters , today increased to 20 mg daily   SBP goal< 160 ,   diet changed to low soldium diet     Hx of HTN- was on Lisinopril in the past which she discontinued on her own 2 yrs ago as BP was on lower side as per patient   never f/u with her PCP since   - Maintain SBPs per Neurosx < 160   - c/w Hydralazine 10mg / Labetalol 10mg IV Q2H for SBP > 160  - Hypertensive urgency with SBP in 180-190's on 4/13 noted   - Lisinopril 10 mg daily started , this am , Lisinopril increased to 20 mg daily   - WILL D/C NS IVF as patient now on regular diet with fluids   - TTE - noted , EF - 70%-75% , Mild to moderate aortic stenosis.  - EKG noted T wave abnormality, consider anterolateral ischemia,     patient is asymptomatic , will recommend CARDIOLOGY EVAL AS ON OUTPATIENT TO R/O ISCHEMIA   - Cardiac monitor reviewed - NSR in 80's    continue to monitor     Last BM 4/14 - Miralax added by me   CONTINUE  DULCOLAX SUPPOSITORY PRN     Hypoglycemia- RESOLVED WITH DIET AND IVF     Metabolic acidosis - resolved   check BMP in am     Leucocytosis - WBC noted trending up from 8 to 14 -->  13 k , today resolved   No S/S of infection     Hypokalemia/ Hypomagnesemia/ Hypophosphatemia - supplemented   monitor electrolytes and supplement if needed     PPX  - SCD's  - Lovenox started as per NS     Will follow along with you .

## 2025-04-15 NOTE — PROGRESS NOTE ADULT - SUBJECTIVE AND OBJECTIVE BOX
CC: Patient being seen for rehabilitation follow up.  Patient fatigued.  Was not able to walk much yesterday due to vision difficulties.     FUNCTIONAL PROGRESS  4/14 SLP  Speech Language Pathology Recommendations: 1. Regular diet, thin fluids as tolerated 2. Standard aspiration precautions 3. Oral care 4. Upright with PO 5. Small bites/sips, slow rate of intake 6. Will follow to check PO tolerance     4/14 PT  Bed Mobility  Bed Mobility Training Supine-to-Sit: minimum assist (75% patient effort);  1 person assist  Bed Mobility Training Limitations: impaired balance;  decreased strength;  impaired vision;  +double vision     Bed-Chair Transfer Training  Transfer Training Bed-to-Chair Transfer: minimum assist (75% patient effort);  1 person assist;  full weight-bearing   rolling walker  Bed-to-Chair Transfer Training Transfer Safety Analysis: decreased strength;  impaired balance    Sit-Stand Transfer Training  Transfer Training Sit-to-Stand Transfer: minimum assist (75% patient effort);  1 person assist;  full weight-bearing   rolling walker  Transfer Training Stand-to-Sit Transfer: minimum assist (75% patient effort);  1 person assist;  full weight-bearing   rolling walker  Sit-to-Stand Transfer Training Transfer Safety Analysis: decreased balance;  decreased strength    Gait Training  Gait Training: minimum assist (75% patient effort);  1 person assist;  full weight-bearing   rolling walker;  bed to chair  Gait Analysis: decreased step length;  decreased strength;  impaired balance    4/14 OT  Bed Mobility  Bed Mobility Training Sit-to-Supine: Left pt OOB in chair   Bed Mobility Training Supine-to-Sit: minimum assist (75% patient effort);  1 person assist;  nonverbal cues (demo/gestures);  verbal cues;  bed rails  Bed Mobility Training Limitations: impaired vision;  double vision ;  impaired ability to control trunk for mobility;  impaired postural control;  decreased strength;  impaired balance;  decreased flexibility    Bed-Chair Transfer Training  Transfer Training Bed-to-Chair Transfer: minimum assist (75% patient effort);  1 person assist;  nonverbal cues (demo/gestures);  verbal cues;  weight-bearing as tolerated   rolling walker;  Cues for sequencing of movement, pt wit posterior lean noted  Transfer Training Chair-to-Bed Transfer: Left pt OOB in chair   Bed-to-Chair Transfer Training Transfer Safety Analysis: decreased weight-shifting ability;  decreased balance;  decreased step length;  decreased strength;  decreased flexibility;  impaired balance;  impaired postural control;  impaired vision;  double vision - eye patch present ;  rolling walker    Sit-Stand Transfer Training  Transfer Training Sit-to-Stand Transfer: minimum assist (75% patient effort);  1 person assist;  nonverbal cues (demo/gestures);  verbal cues;  weight-bearing as tolerated   rolling walker  Transfer Training Stand-to-Sit Transfer: minimum assist (75% patient effort);  1 person assist;  nonverbal cues (demo/gestures);  verbal cues;  weight-bearing as tolerated   rolling walker  Sit-to-Stand Transfer Training Transfer Safety Analysis: decreased weight-shifting ability;  +posterior lean with transfer, cues for proper body position and weight shifting ;  decreased balance;  impaired vision;  impaired postural control;  impaired balance;  decreased strength;  decreased flexibility;  +double vision -eyepatch present ;  rolling walker    Lower Body Dressing Training  Lower Body Dressing Training Assistance: moderate assist (50% patient effort);  maximum assist (25% patient effort);  1 person assist;  nonverbal cues (demo/gestures);  verbal cues;  educated in compensatory dressing techniques;  decreased flexibility;  decreased strength;  impaired balance;  impaired postural control;  impaired vision    Toilet Training  Toilet Training Assistance: moderate assist (50% patient effort);  1 person assist;  nonverbal cues (demo/gestures);  verbal cues;  decreased flexibility;  decreased strength;  impaired balance;  impaired postural control;  impaired vision    VITALS  T(C): 36.8 (04-15-25 @ 07:47), Max: 36.9 (04-15-25 @ 00:04)  HR: 89 (04-15-25 @ 07:47) (85 - 96)  BP: 174/89 (04-15-25 @ 07:47) (131/61 - 174/89)  RR: 18 (04-15-25 @ 07:47) (15 - 18)  SpO2: 93% (04-15-25 @ 07:47) (93% - 98%)  Wt(kg): --    MEDICATIONS   acetaminophen     Tablet .. 650 milliGRAM(s) every 6 hours PRN  acetaminophen   IVPB .. 675 milliGRAM(s) once  bisacodyl Suppository 10 milliGRAM(s) daily PRN  chlorhexidine 2% Cloths 1 Application(s) daily  enoxaparin Injectable 30 milliGRAM(s) every 24 hours  hydrALAZINE Injectable 10 milliGRAM(s) every 2 hours PRN  influenza  Vaccine (HIGH DOSE) 0.5 milliLiter(s) once  labetalol Injectable 10 milliGRAM(s) every 2 hours PRN  pantoprazole  Injectable 40 milliGRAM(s) daily  tamsulosin 0.4 milliGRAM(s) at bedtime      RECENT LABS/IMAGING  - Reviewed Today                        11.8   9.74  )-----------( 198      ( 15 Apr 2025 07:10 )             35.5     04-15    140  |  106  |  6.8[L]  ----------------------------<  98  3.6   |  22.0  |  0.40[L]    Ca    8.3[L]      15 Apr 2025 07:10  Phos  2.4     04-15  Mg     1.7     04-15        Urinalysis Basic - ( 15 Apr 2025 07:10 )    Color: x / Appearance: x / SG: x / pH: x  Gluc: 98 mg/dL / Ketone: x  / Bili: x / Urobili: x   Blood: x / Protein: x / Nitrite: x   Leuk Esterase: x / RBC: x / WBC x   Sq Epi: x / Non Sq Epi: x / Bacteria: x                BLE V DOPPLERS 4/10 - No evidence of deep venous thrombosis in either lower extremity.    MR Head 4/10 - 2 cm hemorrhage within the mid to posterior chloe with punctate hemorrhage centrally which may reflect a focal cavernoma.  Mild chronic periventricular white matter ischemia.    HEAD CT 4/10 -  No change in dorsal left pontine hemorrhage with slight mass effect on the quadrigeminal plate cistern since 4/9/2025.        ----------------------------------------------------------------------------------------  PHYSICAL EXAM  Constitutional - NAD, Comfortable   Extremities - No edema  Neurologic Exam -                    Cognitive - AAO to self, place, date, year, situation     Communication - Fluent, No dysarthria     Cranial Nerves - Left nystagmus, Left CN6 palsy     Motor - Proximal weakness, Nonfocal     Sensory - Intact to LT  Psychiatric - Mood stable, Affect WNL  ----------------------------------------------------------------------------------------  ASSESSMENT/PLAN  74yFemale with functional deficits after a left CVA  Left pontine hemorrhage -  Lisinopril , Hydralazine/Labetalol IV  Oropharyngeal Dysphagia - Puree/NO LIQUIDS  Pain - Tylenol  DVT PPX - SCDs, Lovenox   Rehab/Impaired mobility and function - Patient continues to require hospitalization for the above diagnoses and ongoing active management of comorbid complications that are substantially posing a threat to bodily function, functional ability and quality of life.     Will continue to follow.   Pending BP optimization without IV meds.  Pending progress with ambulation.   Unclear how patient will be able to integrate back home alone with stairs/ambulation.     Will continue to follow.

## 2025-04-15 NOTE — PROGRESS NOTE ADULT - SUBJECTIVE AND OBJECTIVE BOX
HPI:  Patient is a 74-year-old female PMH Raynaud's HTN (off BP medication x 2 years) presents as a transfer from [facility] for ICH. Patient states that she woke up this morning around 7AM with no issues, then around 10:30AM she was brushing her teeth when she had a sudden onset of dizziness, nausea, episode of vomiting and laid down on her bed. Her son found her at 2:30PM laying down while she was still complaining of dizziness, and also was experiencing mouth numbness, right sided numbness and double vision. She was brought to [facility] via ambulance and on arrival she was hypertensive to 's requiring multiple pushes of IV medications and ultimately required a Cardene drip. 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 She was transferred to [facility] for neurosurgical evaluation. On arrival patient complaining of dizziness, right hand and foot decreased sensation, and double vision. Patient denies any AC/AP use and denies any recent trauma. Neurosurgery consulted and patient will be admitted to NSICU for close monitoring.    mRs: 0  ICH score: 1  NIHSS on Arrival: 3 (09 Apr 2025 21:51)      INTERVAL HPI/OVERNIGHT EVENTS:  74y Female s/p seen lying comfortably in bed. Chest pain improved from last night, EKG and labs negative. Pureed Diet. Needs BM. Odell in with clear urine. Now approved for regular diet based on swallow MBS. Episode of hypertension last night, lisinopril increased to 20 from 10.     Vital Signs Last 24 Hrs  T(C): 36.8 (15 Apr 2025 07:47), Max: 36.9 (15 Apr 2025 00:04)  T(F): 98.2 (15 Apr 2025 07:47), Max: 98.4 (15 Apr 2025 00:04)  HR: 89 (15 Apr 2025 07:47) (85 - 96)  BP: 174/89 (15 Apr 2025 07:47) (131/61 - 174/89)  BP(mean): 80 (14 Apr 2025 12:00) (80 - 98)  RR: 18 (15 Apr 2025 07:47) (15 - 18)  SpO2: 93% (15 Apr 2025 07:47) (93% - 98%)    Parameters below as of 15 Apr 2025 07:47  Patient On (Oxygen Delivery Method): room air      PHYSICAL EXAM:  GENERAL: NAD, well-groomed  HEAD:  Atraumatic, normocephalic  RAJWINDER COMA SCORE: E- V- M- = 15  MENTAL STATUS: AAO x3; Awake; Opens eyes spontaneously; Appropriately conversant without aphasia; following simple commands  CRANIAL NERVES: L eye CN 6 palsy with intermittent patching PERRL. Face symmetric w/ normal eye closure and smile, tongue midline. Hearing grossly intact. Speech clear. Head turning and shoulder shrug intact.   MOTOR: strength 5/5 b/l upper and lower extremities  SENSATION: grossly intact to light touch all extremities  EXTREMITIES:  no clubbing, cyanosis, or edema  SKIN: Warm, dry      LABS:                        11.8   9.74  )-----------( 198      ( 15 Apr 2025 07:10 )             35.5     04-15    140  |  106  |  6.8[L]  ----------------------------<  98  3.6   |  22.0  |  0.40[L]    Ca    8.3[L]      15 Apr 2025 07:10  Phos  2.4     04-15  Mg     1.7     04-15      Urinalysis Basic - ( 15 Apr 2025 07:10 )    Color: x / Appearance: x / SG: x / pH: x  Gluc: 98 mg/dL / Ketone: x  / Bili: x / Urobili: x   Blood: x / Protein: x / Nitrite: x   Leuk Esterase: x / RBC: x / WBC x   Sq Epi: x / Non Sq Epi: x / Bacteria: x        04-14 @ 07:01  -  04-15 @ 07:00  --------------------------------------------------------  IN: 1725 mL / OUT: 1300 mL / NET: 425 mL        RADIOLOGY & ADDITIONAL TESTS:    < from: MR Head w/wo IV Cont (04.10.25 @ 02:03) >    IMPRESSION:  2 cm hemorrhage within the mid to posterior chloe with   punctate hemorrhage centrally which may reflect a focal cavernoma.  Mild   chronic periventricular white matter ischemia.    < end of copied text >      MR Head w/wo IV Cont (04.10.25 @ 02:03) >  IMPRESSION:  2 cm hemorrhage within the mid to posterior chloe with   punctate hemorrhage centrally which may reflect a focal cavernoma.  Mild   chronic periventricular white matter ischemia.    CT Head No Cont (04.10.25 @ 01:16) >  IMPRESSION: 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

## 2025-04-15 NOTE — PROGRESS NOTE ADULT - SUBJECTIVE AND OBJECTIVE BOX
Patient seen and examined . C/P L eye double vision , L eye with pach now , denies n/v, denies sob/chest pain ,   , last BM on 4/14 , diet advanced to Regular with fluids , tolerating , this am BP noted in 170's     CC : as above         MEDICATIONS  (STANDING):  acetaminophen   IVPB .. 675 milliGRAM(s) IV Intermittent once  chlorhexidine 2% Cloths 1 Application(s) Topical daily  enoxaparin Injectable 30 milliGRAM(s) SubCutaneous every 24 hours  influenza  Vaccine (HIGH DOSE) 0.5 milliLiter(s) IntraMuscular once  pantoprazole  Injectable 40 milliGRAM(s) IV Push daily  tamsulosin 0.4 milliGRAM(s) Oral at bedtime    MEDICATIONS  (PRN):  acetaminophen     Tablet .. 650 milliGRAM(s) Oral every 6 hours PRN Temp greater or equal to 38C (100.4F), Mild Pain (1 - 3)  bisacodyl Suppository 10 milliGRAM(s) Rectal daily PRN Constipation      LABS:                          11.8   9.74  )-----------( 198      ( 15 Apr 2025 07:10 )             35.5     04-15    140  |  106  |  6.8[L]  ----------------------------<  98  3.6   |  22.0  |  0.40[L]    Ca    8.3[L]      15 Apr 2025 07:10  Phos  2.4     04-15  Mg     1.7     04-15    Troponin T, High Sensitivity (04.15.25 @ 07:10)    Troponin T, High Sensitivity Result: 13: *            RADIOLOGY & ADDITIONAL TESTS:    < from: US Duplex Venous Lower Ext Complete, Bilateral (04.10.25 @ 11:14) >    ACC: [id number] EXAM:  US DPLX LWR EXT VEINS COMPL BI   ORDERED BY: OLYA VELAZQUEZ     PROCEDURE DATE:  04/10/202    < end of copied text >  < from: US Duplex Venous Lower Ext Complete, Bilateral (04.10.25 @ 11:14) >    IMPRESSION:  No evidence of deep venous thrombosis in either lower extremity.      --- End of Report ---    < end of copied text >    < from: 12 Lead ECG (04.13.25 @ 19:07) >    Ventricular Rate 79 BPM    Atrial Rate 79 BPM    P-R Interval 138 ms    QRS Duration 72 ms    Q-T Interval 420 ms    QTC Calculation(Bazett) 481 ms    P Axis 74 degrees    R Axis 7 degrees    T Axis 214 degrees    Diagnosis Line Normal sinus rhythm  Anterior infarct , age undetermined  T wave abnormality, consider inferolateral ischemia  Abnormal ECG    Confirmed by Brandin Meier MD ([id number]) on 4/13/2025 9:56:26 PM    < end of copied text >          REVIEW OF SYSTEMS:    L eye double vision , all other systems are reviewed and are negative .       I&O's Summary    14 Apr 2025 07:01  -  15 Apr 2025 07:00  --------------------------------------------------------  IN: 1725 mL / OUT: 1300 mL / NET: 425 mL          Vital Signs Last 24 Hrs  T(C): 36.8 (15 Apr 2025 07:47), Max: 36.9 (15 Apr 2025 00:04)  T(F): 98.2 (15 Apr 2025 07:47), Max: 98.4 (15 Apr 2025 00:04)  HR: 89 (15 Apr 2025 07:47) (85 - 96)  BP: 174/89 (15 Apr 2025 07:47) (131/61 - 174/89)  BP(mean): 80 (14 Apr 2025 12:00) (80 - 80)  RR: 18 (15 Apr 2025 07:47) (15 - 18)  SpO2: 93% (15 Apr 2025 07:47) (93% - 98%)    Parameters below as of 15 Apr 2025 07:47  Patient On (Oxygen Delivery Method): room air      PHYSICAL EXAM:    GENERAL: NAD, well-groomed, well-developed  HEAD:  Atraumatic, Normocephalic  EYES: EOMI, PERRLA, conjunctiva and sclera clear - R eye   L eye with a patch   NECK: Supple, No JVD, Normal thyroid  NERVOUS SYSTEM:  Alert & Oriented X4 , no focal deficit  CHEST/LUNG: CTA b/l ,  no  rales, rhonchi, wheezing, or rubs  HEART: Regular rate and rhythm; No murmurs, rubs, or gallops  ABDOMEN: Soft, Nontender, Nondistended; Bowel sounds present  EXTREMITIES:  2+ Peripheral Pulses, No clubbing, cyanosis, or edema  LYMPH: No lymphadenopathy noted  SKIN: No rashes or lesions    CAPILLARY BLOOD GLUCOSE

## 2025-04-16 LAB
ANION GAP SERPL CALC-SCNC: 11 MMOL/L — SIGNIFICANT CHANGE UP (ref 5–17)
BUN SERPL-MCNC: 9.4 MG/DL — SIGNIFICANT CHANGE UP (ref 8–20)
CALCIUM SERPL-MCNC: 8.5 MG/DL — SIGNIFICANT CHANGE UP (ref 8.4–10.5)
CHLORIDE SERPL-SCNC: 104 MMOL/L — SIGNIFICANT CHANGE UP (ref 96–108)
CO2 SERPL-SCNC: 24 MMOL/L — SIGNIFICANT CHANGE UP (ref 22–29)
CREAT SERPL-MCNC: 0.4 MG/DL — LOW (ref 0.5–1.3)
EGFR: 103 ML/MIN/1.73M2 — SIGNIFICANT CHANGE UP
EGFR: 103 ML/MIN/1.73M2 — SIGNIFICANT CHANGE UP
GLUCOSE SERPL-MCNC: 94 MG/DL — SIGNIFICANT CHANGE UP (ref 70–99)
HCT VFR BLD CALC: 35.3 % — SIGNIFICANT CHANGE UP (ref 34.5–45)
HGB BLD-MCNC: 11.8 G/DL — SIGNIFICANT CHANGE UP (ref 11.5–15.5)
MAGNESIUM SERPL-MCNC: 2 MG/DL — SIGNIFICANT CHANGE UP (ref 1.6–2.6)
MCHC RBC-ENTMCNC: 30.3 PG — SIGNIFICANT CHANGE UP (ref 27–34)
MCHC RBC-ENTMCNC: 33.4 G/DL — SIGNIFICANT CHANGE UP (ref 32–36)
MCV RBC AUTO: 90.7 FL — SIGNIFICANT CHANGE UP (ref 80–100)
NRBC # BLD AUTO: 0 K/UL — SIGNIFICANT CHANGE UP (ref 0–0)
NRBC # FLD: 0 K/UL — SIGNIFICANT CHANGE UP (ref 0–0)
NRBC BLD AUTO-RTO: 0 /100 WBCS — SIGNIFICANT CHANGE UP (ref 0–0)
PHOSPHATE SERPL-MCNC: 2.9 MG/DL — SIGNIFICANT CHANGE UP (ref 2.4–4.7)
PLATELET # BLD AUTO: 212 K/UL — SIGNIFICANT CHANGE UP (ref 150–400)
PMV BLD: 10.5 FL — SIGNIFICANT CHANGE UP (ref 7–13)
POTASSIUM SERPL-MCNC: 3.5 MMOL/L — SIGNIFICANT CHANGE UP (ref 3.5–5.3)
POTASSIUM SERPL-SCNC: 3.5 MMOL/L — SIGNIFICANT CHANGE UP (ref 3.5–5.3)
RBC # BLD: 3.89 M/UL — SIGNIFICANT CHANGE UP (ref 3.8–5.2)
RBC # FLD: 14.8 % — HIGH (ref 10.3–14.5)
SODIUM SERPL-SCNC: 139 MMOL/L — SIGNIFICANT CHANGE UP (ref 135–145)
WBC # BLD: 7.61 K/UL — SIGNIFICANT CHANGE UP (ref 3.8–10.5)
WBC # FLD AUTO: 7.61 K/UL — SIGNIFICANT CHANGE UP (ref 3.8–10.5)

## 2025-04-16 PROCEDURE — 99233 SBSQ HOSP IP/OBS HIGH 50: CPT

## 2025-04-16 PROCEDURE — 99232 SBSQ HOSP IP/OBS MODERATE 35: CPT

## 2025-04-16 PROCEDURE — 71046 X-RAY EXAM CHEST 2 VIEWS: CPT | Mod: 26

## 2025-04-16 RX ORDER — BISACODYL 5 MG
10 TABLET, DELAYED RELEASE (ENTERIC COATED) ORAL ONCE
Refills: 0 | Status: COMPLETED | OUTPATIENT
Start: 2025-04-16 | End: 2025-04-16

## 2025-04-16 RX ADMIN — Medication 1 APPLICATION(S): at 06:00

## 2025-04-16 RX ADMIN — ENOXAPARIN SODIUM 30 MILLIGRAM(S): 100 INJECTION SUBCUTANEOUS at 22:21

## 2025-04-16 RX ADMIN — Medication 40 MILLIGRAM(S): at 11:14

## 2025-04-16 RX ADMIN — Medication 10 MILLIGRAM(S): at 11:07

## 2025-04-16 RX ADMIN — POLYETHYLENE GLYCOL 3350 17 GRAM(S): 17 POWDER, FOR SOLUTION ORAL at 11:15

## 2025-04-16 RX ADMIN — LISINOPRIL 20 MILLIGRAM(S): 5 TABLET ORAL at 06:00

## 2025-04-16 RX ADMIN — Medication 10 MILLIGRAM(S): at 01:37

## 2025-04-16 NOTE — DIETITIAN NUTRITION RISK NOTIFICATION - TREATMENT: THE FOLLOWING DIET HAS BEEN RECOMMENDED
Diet, Regular:   DASH/TLC {Sodium & Cholesterol Restricted} (DASH) (04-14-25 @ 11:57) [Active]

## 2025-04-16 NOTE — PROGRESS NOTE ADULT - SUBJECTIVE AND OBJECTIVE BOX
Patient seen and examined . Comfortable , denies n/v, voiding , last BM 2 days ago . C/O L eye double vision , no sob/chest pain .   BP noted elevated over night , patient is asymptomatic .     CC : as above       MEDICATIONS  (STANDING):  acetaminophen   IVPB .. 675 milliGRAM(s) IV Intermittent once  chlorhexidine 2% Cloths 1 Application(s) Topical daily  enoxaparin Injectable 30 milliGRAM(s) SubCutaneous every 24 hours  influenza  Vaccine (HIGH DOSE) 0.5 milliLiter(s) IntraMuscular once  lisinopril 20 milliGRAM(s) Oral daily  pantoprazole  Injectable 40 milliGRAM(s) IV Push daily  polyethylene glycol 3350 17 Gram(s) Oral daily  tamsulosin 0.4 milliGRAM(s) Oral at bedtime    MEDICATIONS  (PRN):  acetaminophen     Tablet .. 650 milliGRAM(s) Oral every 6 hours PRN Temp greater or equal to 38C (100.4F), Mild Pain (1 - 3)  bisacodyl Suppository 10 milliGRAM(s) Rectal daily PRN Constipation      LABS:                          11.8   7.61  )-----------( 212      ( 16 Apr 2025 07:12 )             35.3     04-16    139  |  104  |  9.4  ----------------------------<  94  3.5   |  24.0  |  0.40[L]    Ca    8.5      16 Apr 2025 07:12  Phos  2.9     04-16  Mg     2.0     04-16      RADIOLOGY & ADDITIONAL TESTS:    < from: Xray Chest 2 Views PA/Lat (04.16.25 @ 00:01) >    ACC: [id number] EXAM:  XR CHEST PA LAT 2V   ORDERED BY: LEXIS COLUNGA     PROCEDURE DATE:  04/16/2025          INTERPRETATION:  TECHNIQUE: 2 views of the chest.    COMPARISON: 4/13/2025    CLINICAL HISTORY: atelectasis vs pneumonia    FINDINGS:    Frontal and lateral views of the chest demonstrates a new small bilateral   pleural effusions. Mild bibasilar atelectasis. Osteopenia. Moderate   emphysema changes. No pneumonia. Overlying EKG leads and wires are   noted.. The cardiomediastinal silhouette is unremarkable. No acute   osseous abnormalities. Chronic right-sided rib fractures.    IMPRESSION:    New small bilateral pleural effusions. Mild bibasilar atelectasis. No   pneumonia.    --- End of Report ---    < end of copied text >        REVIEW OF SYSTEMS:    As above , all other systems are reviewed and are negative .       15 Apr 2025 07:01  -  16 Apr 2025 07:00  --------------------------------------------------------  IN: 0 mL / OUT: 500 mL / NET: -500 mL          Vital Signs Last 24 Hrs  T(C): 36.7 (16 Apr 2025 11:47), Max: 36.8 (15 Apr 2025 19:47)  T(F): 98.1 (16 Apr 2025 11:47), Max: 98.3 (15 Apr 2025 19:47)  HR: 99 (16 Apr 2025 11:47) (84 - 99)  BP: 165/93 (16 Apr 2025 11:47) (150/74 - 184/82)  BP(mean): --  RR: 18 (16 Apr 2025 11:47) (18 - 18)  SpO2: 96% (16 Apr 2025 11:47) (94% - 96%)    Parameters below as of 16 Apr 2025 11:47  Patient On (Oxygen Delivery Method): room air      PHYSICAL EXAM:    GENERAL: NAD,   HEAD:  Atraumatic, Normocephalic  EYES: EOMI, PERRLA, conjunctiva and sclera clear,   NECK: Supple, No JVD, Normal thyroid  NERVOUS SYSTEM:  Alert & Oriented X4 ,no focal deficit   CHEST/LUNG: CTA b/l ,  no  rales, rhonchi, wheezing, or rubs  HEART: Regular rate and rhythm; No murmurs, rubs, or gallops  ABDOMEN: Soft, Nontender, Nondistended; Bowel sounds present  EXTREMITIES:  2+ Peripheral Pulses, No clubbing, cyanosis, or edema  LYMPH: No lymphadenopathy noted  SKIN: No rashes or lesions

## 2025-04-16 NOTE — PROGRESS NOTE ADULT - ASSESSMENT
74-year-old female PMH Raynaud's and HTN (off BP medication x 2 years) presents as a transfer from [facility] for ICH.     PLAN  - Q4 neuro checks and vitals  - MBS eval complete pt approved for regular diet  - Medicine reccs appreciated  - Pending read of repeat chest Xray  - Diet - Regular DASH/TLC, supervised meals  - Cardiology recs appreciated  - Increased to 20mg QD for HTN, still requiring IV push HTN medications    -one time dose of 25mg HXTZ given in AM for HTN  - Chest pain has resolved and troponin - continues to be negative  - OOB as tolerated/PT   - LBM 4/13    - Suppository today  - SCDs in bed, on DVT ppx with SQL  - Pending placement to [place] [facility]  - PM&R recs appreciated  - Case and plan to be discussed with Dr. Farmer in AM rounds

## 2025-04-16 NOTE — PROGRESS NOTE ADULT - SUBJECTIVE AND OBJECTIVE BOX
HPI:  Patient is a 74-year-old female PMH Raynaud's HTN (off BP medication x 2 years) presents as a transfer from [facility] for ICH. Patient states that she woke up this morning around 7AM with no issues, then around 10:30AM she was brushing her teeth when she had a sudden onset of dizziness, nausea, episode of vomiting and laid down on her bed. Her son found her at 2:30PM laying down while she was still complaining of dizziness, and also was experiencing mouth numbness, right sided numbness and double vision. She was brought to [facility] via ambulance and on arrival she was hypertensive to 's requiring multiple pushes of IV medications and ultimately required a Cardene drip. 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 She was transferred to [facility] for neurosurgical evaluation. On arrival patient complaining of dizziness, right hand and foot decreased sensation, and double vision. Patient denies any AC/AP use and denies any recent trauma. Neurosurgery consulted and patient will be admitted to NSICU for close monitoring.    mRs: 0  ICH score: 1  NIHSS on Arrival: 3 (09 Apr 2025 21:51)      INTERVAL HPI/OVERNIGHT EVENTS:  74y Female s/p seen lying comfortably in bed. Now on regular diet. Last bowel movement 4/13. Odell in with clear urine on flomax. Now approved for regular diet based on swallow MBS. Was started on lisinopril 20mg in AM. Chest xray reviewed by medicine team. Patient denies any headache, nausea, vomiting, shortness of breath, chest pain, difficulty breathing. Unable to get transferred to [facility] 4/14 due to hypertension episodes that are requiring PRN IV push hypertensives, being managed by Medicine team.  4/16: Episode of hypertension last night again despite an additional dose of lisinopril 10mg at night, patient still required IV hydralazine pushes. This morning patient received 25mg dose of HCTZ per medicine team recs on phone. they will continue to follow up chest xray read but believe it to be atelectasis.    Vital Signs Last 24 Hrs  T(C): 36.8 (16 Apr 2025 08:23), Max: 36.8 (15 Apr 2025 19:47)  T(F): 98.2 (16 Apr 2025 08:23), Max: 98.3 (15 Apr 2025 19:47)  HR: 85 (16 Apr 2025 08:23) (84 - 97)  BP: 172/85 (16 Apr 2025 08:23) (150/74 - 184/82)  BP(mean): --  RR: 18 (16 Apr 2025 08:23) (18 - 18)  SpO2: 96% (16 Apr 2025 08:23) (94% - 96%)    Parameters below as of 16 Apr 2025 08:23  Patient On (Oxygen Delivery Method): room air      PHYSICAL EXAM:  GENERAL: NAD, well-groomed  HEAD:  Atraumatic, normocephalic  RAJWINDER COMA SCORE: E- V- M- = 15  MENTAL STATUS: AAO x3; Awake; Opens eyes spontaneously; Appropriately conversant without aphasia; following simple commands  CRANIAL NERVES: L eye CN 6 palsy with intermittent patching PERRL. Face symmetric w/ normal eye closure and smile, tongue midline. Hearing grossly intact. Speech clear. Head turning and shoulder shrug intact.   MOTOR: strength 5/5 b/l upper and lower extremities  SENSATION: grossly intact to light touch all extremities  Pulm: equal bilateral chest rise, no accessory muscle usage, no cough  EXTREMITIES:  no clubbing, cyanosis, or edema  SKIN: Warm, dry    LABS:                        11.8   7.61  )-----------( 212      ( 16 Apr 2025 07:12 )             35.3     04-16    139  |  104  |  9.4  ----------------------------<  94  3.5   |  24.0  |  0.40[L]    Ca    8.5      16 Apr 2025 07:12  Phos  2.9     04-16  Mg     2.0     04-16        Urinalysis Basic - ( 16 Apr 2025 07:12 )    Color: x / Appearance: x / SG: x / pH: x  Gluc: 94 mg/dL / Ketone: x  / Bili: x / Urobili: x   Blood: x / Protein: x / Nitrite: x   Leuk Esterase: x / RBC: x / WBC x   Sq Epi: x / Non Sq Epi: x / Bacteria: x        04-15 @ 07:01  -  04-16 @ 07:00  --------------------------------------------------------  IN: 0 mL / OUT: 500 mL / NET: -500 mL        RADIOLOGY & ADDITIONAL TESTS:  MR Head w/wo IV Cont (04.10.25 @ 02:03) >    IMPRESSION:  2 cm hemorrhage within the mid to posterior chloe with   punctate hemorrhage centrally which may reflect a focal cavernoma.  Mild   chronic periventricular white matter ischemia.    MR Head w/wo IV Cont (04.10.25 @ 02:03) >  IMPRESSION:  2 cm hemorrhage within the mid to posterior chloe with   punctate hemorrhage centrally which may reflect a focal cavernoma.  Mild   chronic periventricular white matter ischemia.    CT Head No Cont (04.10.25 @ 01:16) >  IMPRESSION: 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

## 2025-04-16 NOTE — PROGRESS NOTE ADULT - SUBJECTIVE AND OBJECTIVE BOX
HPI: Patient seen and examined at bedside. Patient frustrated as she is usually very independent. Did complain of some dizziness in AM that improved.       FUNCTIONAL PROGRESS  OT 4/16    Bed-Chair Transfer Training  Transfer Training Bed-to-Chair Transfer: minimum assist (75% patient effort);  1 person assist  Transfer Training Chair-to-Bed Transfer: minimum assist (75% patient effort);  1 person assist    Sit-Stand Transfer Training  Transfer Training Sit-to-Stand Transfer: minimum assist (75% patient effort);  1 person assist  Transfer Training Stand-to-Sit Transfer: minimum assist (75% patient effort);  1 person assist    Lower Body Dressing Training  Lower Body Dressing Training Assistance: maximum assist (25% patient effort)    Grooming Training  Grooming Training Assistance: stand-by assist      VITALS  T(C): 36.6 (04-16-25 @ 16:03), Max: 36.8 (04-15-25 @ 19:47)  HR: 97 (04-16-25 @ 16:03) (84 - 99)  BP: 130/76 (04-16-25 @ 16:03) (130/76 - 184/82)  RR: 17 (04-16-25 @ 16:03) (17 - 18)  SpO2: 93% (04-16-25 @ 16:03) (93% - 96%)  Wt(kg): --    MEDICATIONS   acetaminophen     Tablet .. 650 milliGRAM(s) every 6 hours PRN  acetaminophen   IVPB .. 675 milliGRAM(s) once  bisacodyl Suppository 10 milliGRAM(s) daily PRN  chlorhexidine 2% Cloths 1 Application(s) daily  enoxaparin Injectable 30 milliGRAM(s) every 24 hours  influenza  Vaccine (HIGH DOSE) 0.5 milliLiter(s) once  lisinopril 20 milliGRAM(s) daily  pantoprazole  Injectable 40 milliGRAM(s) daily  polyethylene glycol 3350 17 Gram(s) daily  tamsulosin 0.4 milliGRAM(s) at bedtime      RECENT LABS/IMAGING                          11.8   7.61  )-----------( 212      ( 16 Apr 2025 07:12 )             35.3     04-16    139  |  104  |  9.4  ----------------------------<  94  3.5   |  24.0  |  0.40[L]    Ca    8.5      16 Apr 2025 07:12  Phos  2.9     04-16  Mg     2.0     04-16        Urinalysis Basic - ( 16 Apr 2025 07:12 )    Color: x / Appearance: x / SG: x / pH: x  Gluc: 94 mg/dL / Ketone: x  / Bili: x / Urobili: x   Blood: x / Protein: x / Nitrite: x   Leuk Esterase: x / RBC: x / WBC x   Sq Epi: x / Non Sq Epi: x / Bacteria: x              PHYSICAL EXAM  Constitutional - NAD, Comfortable   Extremities - No edema  Neurologic Exam -                    Cognitive - AAO to self, place, date, year, situation     Communication - Fluent, No dysarthria     Cranial Nerves - Left nystagmus, Left CN6 palsy     Motor - Proximal weakness, Nonfocal     Sensory - Intact to LT  Psychiatric - Mood stable, Affect WNL  ----------------------------------------------------------------------------------------  ASSESSMENT/PLAN  74yFemale with functional deficits after a left CVA  Left pontine hemorrhage -  Lisinopril , Hydralazine/Labetalol IV  Oropharyngeal Dysphagia - S/p MBS 4/15, can have thin liquids  Pain - Tylenol  DVT PPX - SCDs, Lovenox   Rehab/Impaired mobility and function - Patient continues to require hospitalization for the above diagnoses and ongoing active management of comorbid complications that are substantially posing a threat to bodily function, functional ability and quality of life.     Will continue to follow.   Pending BP optimization without IV meds, BP still elevated this AM.   Pending progress with ambulation.   Still unclear how patient will be able to integrate back home alone with stairs/ambulation.     Will continue to follow.

## 2025-04-16 NOTE — CHART NOTE - NSCHARTNOTEFT_GEN_A_CORE
Source: Patient [x ]  Family [ ]   other [ x] chart,staff     Current Diet:   Diet, Regular:   DASH/TLC {Sodium & Cholesterol Restricted} (DASH) (04-14-25 @ 11:57) [Active]    Patient reports [ ] nausea  [ ] vomiting [ ] diarrhea [ ] constipation  [ ]chewing problems [ ] swallowing issues  [ ] other: denies     PO intake:  < 50% [ ]   50-75%  [x ]   %  [x ]  other :    Source for PO intake [x ] Patient [ ] family [x ] chart [ ] staff [ ] other    Current Weight:   4/11 99.2#   no edema noted    % Weight Change: no new weights in chart     Pertinent Medications: MEDICATIONS  (STANDING):  hydrochlorothiazide 25 milliGRAM(s) Oral once  polyethylene glycol 3350 17 Gram(s) Oral daily    Pertinent Labs: 04-16 Na139 mmol/L Glu 94 mg/dL K+ 3.5 mmol/L Cr  0.40 mg/dL[L] BUN 9.4 mg/dL Phos 2.9 mg/dL       Nutrition focused physical exam conducted - found signs of malnutrition [ ]absent [x ]present    Subcutaneous fat loss: [x ] Orbital fat pads region, [x ]Buccal fat region, [ ]Triceps region,  [ ]Ribs region    Muscle wasting: [x ]Temples region, [x ]Clavicle region, [ x]Shoulder region, [ ]Scapula region, [ ]Interosseous region,  [ ]thigh region, [ ]Calf region    Estimated Needs:   [x ] no change since previous assessment  [ ] recalculated:     75 y/o Female with PMH Raynaud's and HTN presented to [facility] c/o visual changes, R sided numbness, and dizziness. Neurosurgery consulted for CTH with acute 2 x 1.5cm chloe IPH.     Current Nutrition Diagnosis: Malnutrition ( moderate,  chronic) related to inability to meet sufficient protein-energy needs in setting of IPH as evidenced by moderate muscle/fat loss, meeting <75% EER >1mo    Spoke with pt this AM. consuming breakfast tray at time of visit. tolerating diet at this time. Nutrition interview/diet hx obtained. Fair po intake noted today, however pt trying to increase po intake at meals. Fair po intake also noted PTA. Unsure if any recent weight change. recommend to add ensure plus HP BID to optimize po intake and provide an additional 350 kcal, 20g protein per serving. Last BM documented 3/15. Will monitor weights for trends/accuracy. RD to remain available.     Recommendations:   - Recommend ensure plus HP BID  - Provide encouragement/assistance as needed during mealtimes to inc PO   - Continue diet as tolerated.   - Rx MVI daily, vit C 500mg     Monitoring and Evaluation:   [ x] PO intake [x ] Tolerance to diet prescription [X] Weights  [X] Follow up per protocol [X] Labs: chem 8, mg, phos, H/H

## 2025-04-16 NOTE — PROGRESS NOTE ADULT - ASSESSMENT
75 y/o Female with PMH Raynaud's and HTN presented to [facility] c/o visual changes, R sided numbness, and dizziness. Neurosurgery consulted for CTH with acute 2 x 1.5cm chloe IPH. Patient transferred to [facility] for Neurosurgical ICU admission and further management.    Intraparenchymal Hemorrhage  - CTH with acute 2 x 1.5cm chloe IPH, 04/09  - Repeat CTH on 04/10- no changes   - Aspiration Precautions  - Seizure Precautions  - c/w PT/OT recs  - Downgrade to Neurosurgery Step down unit----> on 4/14   to telemetry   - PRN Tylenol 975mg q6hr       Dysphagia, MBS done - diet advanced to regular with fluids   - Aspiration precautions       Urinary retention  - Odell placed this am 4/13  - Flomax 0.4 mg qhs   - BM goal - daily , last BM on 4/14  - TOV in in 2-3 days     Hx of Raynaud's    Chest pain on 4/13 , while having SBP in 180's-190's - seen and evaluated by cardio ,   Trop x2 negative , EKG without acute changes - chest pain likely due to Hypertensive urgency .   Chest pain resolved with BP control . Started on Lisinopril 10 mg daily with parameters , today increased to 20 mg daily   SBP goal< 160 ,   diet changed to low soldium diet     Hx of HTN- was on Lisinopril in the past which she discontinued on her own 2 yrs ago as BP was on lower side as per patient   never f/u with her PCP since   - Maintain SBPs per Neurosx < 160   - c/w Hydralazine 10mg / Labetalol 10mg IV Q2H for SBP > 160  - Hypertensive urgency with SBP in 180-190's on 4/13 noted   - Lisinopril 10 mg daily started and increased to 20 mg daily ,   overnight noted with - 180 , Hydralazine ivp x1 after MN given , this am HCTZ 25 mg restarted   - ivf d/mercedes   - diet changed to low sodium   - WILL RECOMMEND TO START AMLODIPINE TONIGHT AT QHS if SBP > 160   - TTE - noted , EF - 70%-75% , Mild to moderate aortic stenosis.  - EKG noted T wave abnormality, consider anterolateral ischemia,     patient is asymptomatic , will recommend CARDIOLOGY EVAL AS ON OUTPATIENT TO R/O ISCHEMIA   - Cardiac monitor reviewed - NSR in 80's    continue to monitor     Last BM 4/14 - Miralax added by me   CONTINUE  DULCOLAX SUPPOSITORY PRN     Hypoglycemia- RESOLVED WITH DIET AND IVF     Metabolic acidosis - resolved   check BMP in am     Leucocytosis - WBC noted trending up from 8 to 14 -->  13 k , today resolved   No S/S of infection   CXR repeated and PNA R/O ,   New small bilateral pleural effusions. Mild bibasilar atelectasis  Continue HCTZ DAILY ,   IS-     Hypokalemia/ Hypomagnesemia/ Hypophosphatemia - supplemented   monitor electrolytes and supplement if needed     PPX  - SCD's  - Lovenox started as per NS     Dispo plan rehab - likely in am if SBP =/< 160     D/W NS PA - made aware of plan .     Will follow along with you .

## 2025-04-17 LAB
ANION GAP SERPL CALC-SCNC: 13 MMOL/L — SIGNIFICANT CHANGE UP (ref 5–17)
BUN SERPL-MCNC: 8 MG/DL — SIGNIFICANT CHANGE UP (ref 8–20)
CALCIUM SERPL-MCNC: 8.8 MG/DL — SIGNIFICANT CHANGE UP (ref 8.4–10.5)
CHLORIDE SERPL-SCNC: 101 MMOL/L — SIGNIFICANT CHANGE UP (ref 96–108)
CO2 SERPL-SCNC: 25 MMOL/L — SIGNIFICANT CHANGE UP (ref 22–29)
CREAT SERPL-MCNC: 0.52 MG/DL — SIGNIFICANT CHANGE UP (ref 0.5–1.3)
EGFR: 97 ML/MIN/1.73M2 — SIGNIFICANT CHANGE UP
EGFR: 97 ML/MIN/1.73M2 — SIGNIFICANT CHANGE UP
GLUCOSE SERPL-MCNC: 88 MG/DL — SIGNIFICANT CHANGE UP (ref 70–99)
HCT VFR BLD CALC: 37 % — SIGNIFICANT CHANGE UP (ref 34.5–45)
HGB BLD-MCNC: 12.4 G/DL — SIGNIFICANT CHANGE UP (ref 11.5–15.5)
MAGNESIUM SERPL-MCNC: 1.8 MG/DL — SIGNIFICANT CHANGE UP (ref 1.6–2.6)
MCHC RBC-ENTMCNC: 30.3 PG — SIGNIFICANT CHANGE UP (ref 27–34)
MCHC RBC-ENTMCNC: 33.5 G/DL — SIGNIFICANT CHANGE UP (ref 32–36)
MCV RBC AUTO: 90.5 FL — SIGNIFICANT CHANGE UP (ref 80–100)
NRBC # BLD AUTO: 0 K/UL — SIGNIFICANT CHANGE UP (ref 0–0)
NRBC # FLD: 0 K/UL — SIGNIFICANT CHANGE UP (ref 0–0)
NRBC BLD AUTO-RTO: 0 /100 WBCS — SIGNIFICANT CHANGE UP (ref 0–0)
PHOSPHATE SERPL-MCNC: 3.4 MG/DL — SIGNIFICANT CHANGE UP (ref 2.4–4.7)
PLATELET # BLD AUTO: 260 K/UL — SIGNIFICANT CHANGE UP (ref 150–400)
PMV BLD: 10.8 FL — SIGNIFICANT CHANGE UP (ref 7–13)
POTASSIUM SERPL-MCNC: 3.6 MMOL/L — SIGNIFICANT CHANGE UP (ref 3.5–5.3)
POTASSIUM SERPL-SCNC: 3.6 MMOL/L — SIGNIFICANT CHANGE UP (ref 3.5–5.3)
RBC # BLD: 4.09 M/UL — SIGNIFICANT CHANGE UP (ref 3.8–5.2)
RBC # FLD: 14.6 % — HIGH (ref 10.3–14.5)
SODIUM SERPL-SCNC: 139 MMOL/L — SIGNIFICANT CHANGE UP (ref 135–145)
WBC # BLD: 7.89 K/UL — SIGNIFICANT CHANGE UP (ref 3.8–10.5)
WBC # FLD AUTO: 7.89 K/UL — SIGNIFICANT CHANGE UP (ref 3.8–10.5)

## 2025-04-17 PROCEDURE — 99232 SBSQ HOSP IP/OBS MODERATE 35: CPT

## 2025-04-17 PROCEDURE — 99233 SBSQ HOSP IP/OBS HIGH 50: CPT

## 2025-04-17 RX ORDER — AMLODIPINE BESYLATE 10 MG/1
2.5 TABLET ORAL ONCE
Refills: 0 | Status: COMPLETED | OUTPATIENT
Start: 2025-04-17 | End: 2025-04-17

## 2025-04-17 RX ORDER — LISINOPRIL 5 MG/1
20 TABLET ORAL
Refills: 0 | Status: DISCONTINUED | OUTPATIENT
Start: 2025-04-17 | End: 2025-04-18

## 2025-04-17 RX ADMIN — LISINOPRIL 20 MILLIGRAM(S): 5 TABLET ORAL at 05:42

## 2025-04-17 RX ADMIN — Medication 40 MILLIGRAM(S): at 12:04

## 2025-04-17 RX ADMIN — AMLODIPINE BESYLATE 2.5 MILLIGRAM(S): 10 TABLET ORAL at 00:45

## 2025-04-17 RX ADMIN — Medication 1 APPLICATION(S): at 06:00

## 2025-04-17 RX ADMIN — ENOXAPARIN SODIUM 30 MILLIGRAM(S): 100 INJECTION SUBCUTANEOUS at 21:47

## 2025-04-17 RX ADMIN — Medication 20 MILLIEQUIVALENT(S): at 13:23

## 2025-04-17 RX ADMIN — Medication 10 MILLIGRAM(S): at 04:18

## 2025-04-17 NOTE — PROGRESS NOTE ADULT - SUBJECTIVE AND OBJECTIVE BOX
Patient seen and examined . Sitting comfortable on the chair , denies sob/chest pain , denies HA, denies n/v , voiding , last B on 4/14.   Tolerating diet . Overnight noted with SBP in 170's - asymptomatic , BP checked by me - 161.80 .     CC : no complaints       MEDICATIONS  (STANDING):  acetaminophen   IVPB .. 675 milliGRAM(s) IV Intermittent once  chlorhexidine 2% Cloths 1 Application(s) Topical daily  enoxaparin Injectable 30 milliGRAM(s) SubCutaneous every 24 hours  hydrochlorothiazide 25 milliGRAM(s) Oral daily  influenza  Vaccine (HIGH DOSE) 0.5 milliLiter(s) IntraMuscular once  lisinopril 20 milliGRAM(s) Oral two times a day  pantoprazole  Injectable 40 milliGRAM(s) IV Push daily  polyethylene glycol 3350 17 Gram(s) Oral daily  tamsulosin 0.4 milliGRAM(s) Oral at bedtime    MEDICATIONS  (PRN):  acetaminophen     Tablet .. 650 milliGRAM(s) Oral every 6 hours PRN Temp greater or equal to 38C (100.4F), Mild Pain (1 - 3)  bisacodyl Suppository 10 milliGRAM(s) Rectal daily PRN Constipation  hydrALAZINE 25 milliGRAM(s) Oral every 6 hours PRN Systolic blood pressure > 160      LABS:                          12.4   7.89  )-----------( 260      ( 17 Apr 2025 06:00 )             37.0     04-17    139  |  101  |  8.0  ----------------------------<  88  3.6   |  25.0  |  0.52    Ca    8.8      17 Apr 2025 06:00  Phos  3.4     04-17  Mg     1.8     04-17      RADIOLOGY & ADDITIONAL TESTS:    < from: Xray Chest 2 Views PA/Lat (04.16.25 @ 00:01) >    ACC: [id number] EXAM:  XR CHEST PA LAT 2V   ORDERED BY: LEXIS COLUNGA     PROCEDURE DATE:  04/16/2025          INTERPRETATION:  TECHNIQUE: 2 views of the chest.    COMPARISON: 4/13/2025    CLINICAL HISTORY: atelectasis vs pneumonia    FINDINGS:    Frontal and lateral views of the chest demonstrates a new small bilateral   pleural effusions. Mild bibasilar atelectasis. Osteopenia. Moderate   emphysema changes. No pneumonia. Overlying EKG leads and wires are   noted.. The cardiomediastinal silhouette is unremarkable. No acute   osseous abnormalities. Chronic right-sided rib fractures.    IMPRESSION:    New small bilateral pleural effusions. Mild bibasilar atelectasis. No   pneumonia.    --- End of Report ---      < end of copied text >        REVIEW OF SYSTEMS:    All systems are reviewed and are negative .     I&O's Summary        Vital Signs Last 24 Hrs  T(C): 36.6 (17 Apr 2025 08:18), Max: 36.9 (16 Apr 2025 20:00)  T(F): 97.9 (17 Apr 2025 08:18), Max: 98.4 (16 Apr 2025 20:00)  HR: 82 (17 Apr 2025 08:18) (80 - 99)  BP: 161/85 (17 Apr 2025 09:35) (130/76 - 173/83)  BP(mean): --  RR: 18 (17 Apr 2025 08:18) (17 - 18)  SpO2: 97% (17 Apr 2025 08:18) (93% - 97%)    Parameters below as of 17 Apr 2025 08:18  Patient On (Oxygen Delivery Method): room air      PHYSICAL EXAM:    GENERAL: NAD, well-groomed, well-developed  HEAD:  Atraumatic, Normocephalic  EYES: EOMI, PERRLA, conjunctiva and sclera clear  NECK: Supple, No JVD, Normal thyroid  NERVOUS SYSTEM:  Alert & Oriented X4, no focal deficit  CHEST/LUNG: CTA b/l ,  no  rales, rhonchi, wheezing, or rubs  HEART: Regular rate and rhythm; No murmurs, rubs, or gallops  ABDOMEN: Soft, Nontender, Nondistended; Bowel sounds present  EXTREMITIES:  2+ Peripheral Pulses, No clubbing, cyanosis, or edema  LYMPH: No lymphadenopathy noted  SKIN: No rashes or lesions

## 2025-04-17 NOTE — PROGRESS NOTE ADULT - ASSESSMENT
75 y/o Female with PMH Raynaud's and HTN presented to [facility] c/o visual changes, R sided numbness, and dizziness. Neurosurgery consulted for CTH with acute 2 x 1.5cm chloe IPH. Patient transferred to [facility] for Neurosurgical ICU admission and further management.    Intraparenchymal Hemorrhage  - CTH with acute 2 x 1.5cm chloe IPH, 04/09  - Repeat CTH on 04/10- no changes   - Neurologically stable   - Aspiration Precautions  - Seizure Precautions  - c/w PT/OT recs  - Downgrade to Neurosurgery Step down unit----> on 4/14   to telemetry   - PRN Tylenol 975mg q6hr       Dysphagia, MBS done - diet advanced to regular with fluids   - Aspiration precautions       Urinary retention  - Odell placed on 4/13- removed on 4/16 , voiding , check PVR , if > 350 ml straight  cath   - Flomax 0.4 mg qhs - continue for now   - BM goal - daily , last BM on 4/14  - TOV in in 2-3 days     Hx of Raynaud's    Chest pain on 4/13 , while having SBP in 180's-190's - seen and evaluated by cardio ,   Trop x2 negative , EKG without acute changes - chest pain likely due to Hypertensive urgency .   Chest pain resolved with BP control . Started on Lisinopril 10 mg daily with parameters , today increased to 20 mg daily   SBP goal< 160 ,   diet changed to low soldium diet     Hx of HTN- was on Lisinopril in the past which she discontinued on her own 2 yrs ago as BP was on lower side as per patient   never f/u with her PCP since   - Maintain SBPs per Neurosx < 160 , now on Lisinopril 20 mg daily / HCTZ 25 mg daily -   overnight with SBP - 170's , Amlodipine 2.5 mg / Hydralazine given x1 , repeated SBP - 160 ,   patient is asymptomatic .   NEED BETTER BP CONTROL , LISINOPRIL INCREASED TO 20 MG BID,   continue HCTZ 25 mg daily , will order Hydralazine 25 mg PO q6 hrs for SBP > 160   CONTINUE LOW SODIUM DIET     - TTE - noted , EF - 70%-75% , Mild to moderate aortic stenosis.  - EKG noted T wave abnormality, consider anterolateral ischemia,     patient is asymptomatic , will recommend CARDIOLOGY EVAL AS ON OUTPATIENT TO R/O ISCHEMIA   - Cardiac monitor reviewed - NSR in 80's    continue to monitor     Last BM 4/14 - Miralax added by me   CONTINUE  DULCOLAX SUPPOSITORY PRN     Hypoglycemia- RESOLVED WITH DIET AND IVF     Metabolic acidosis - resolved   check BMP in am     Leucocytosis - WBC noted trending up from 8 to 14 -->  13 k , today resolved   No S/S of infection   CXR with ? PNA vs atelectasis   CXR repeated and PNA R/O ,   New small bilateral pleural effusions. Mild bibasilar atelectasis  Continue HCTZ DAILY ,   IS-    Hypomagnesemia/ Hypophosphatemia - supplemented   K- 3.6 - potassium 20 meq ordered by me     PPX  - SCD's  - Lovenoxas per NS     Dispo plan rehab - likely in am if SBP =/< 160     D/W NS PA - made aware of plan .     Will follow along with you .

## 2025-04-17 NOTE — PROGRESS NOTE ADULT - SUBJECTIVE AND OBJECTIVE BOX
HPI: Patient is a 74-year-old female PMH Raynaud's HTN (off BP medication x 2 years) presents as a transfer from [facility] for ICH. Patient states that she woke up this morning around 7AM with no issues, then around 10:30AM she was brushing her teeth when she had a sudden onset of dizziness, nausea, episode of vomiting and laid down on her bed. Her son found her at 2:30PM laying down while she was still complaining of dizziness, and also was experiencing mouth numbness, right sided numbness and double vision. She was brought to [facility] via ambulance and on arrival she was hypertensive to 's requiring multiple pushes of IV medications and ultimately required a Cardene drip. 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 She was transferred to [facility] for neurosurgical evaluation. On arrival patient complaining of dizziness, right hand and foot decreased sensation, and double vision. Patient denies any AC/AP use and denies any recent trauma. Neurosurgery consulted and patient will be admitted to NSICU for close monitoring. (09 Apr 2025 21:51)      INTERVAL HPI/OVERNIGHT EVENTS:  74y Female s/p seen lying comfortably in bed. Now on regular diet. Last bowel movement 4/13. Odell in with clear urine on flomax, plan for TOV. Now approved for regular diet based on swallow MBS. Was started on lisinopril 20mg yesterday.    ON: hypertensive SBP>170, gave Amlodipine 2.5 x1 and hydralazine 10mg PO x1    Vital Signs Last 24 Hrs  T(C): 36.6 (17 Apr 2025 08:18), Max: 36.9 (16 Apr 2025 20:00)  T(F): 97.9 (17 Apr 2025 08:18), Max: 98.4 (16 Apr 2025 20:00)  HR: 82 (17 Apr 2025 08:18) (80 - 99)  BP: 161/85 (17 Apr 2025 09:35) (130/76 - 173/83)  BP(mean): --  RR: 18 (17 Apr 2025 08:18) (17 - 18)  SpO2: 97% (17 Apr 2025 08:18) (93% - 97%)    Parameters below as of 17 Apr 2025 08:18  Patient On (Oxygen Delivery Method): room air    PHYSICAL EXAM:  GENERAL: NAD, well-groomed  HEAD:  Atraumatic, normocephalic  RAJWINDER COMA SCORE: E- V- M- = 15  MENTAL STATUS: AAO x3; Awake; Opens eyes spontaneously; Appropriately conversant without aphasia; following simple commands  CRANIAL NERVES: L eye CN 6 palsy with intermittent patching PERRL. Face symmetric w/ normal eye closure and smile, tongue midline. Hearing grossly intact. Speech clear. Head turning and shoulder shrug intact.   MOTOR: strength 5/5 b/l upper and lower extremities  SENSATION: grossly intact to light touch all extremities  Pulm: equal bilateral chest rise, no accessory muscle usage, no cough  EXTREMITIES:  no clubbing, cyanosis, or edema  SKIN: Warm, dry    LABS:                        12.4   7.89  )-----------( 260      ( 17 Apr 2025 06:00 )             37.0     04-17    139  |  101  |  8.0  ----------------------------<  88  3.6   |  25.0  |  0.52    Ca    8.8      17 Apr 2025 06:00  Phos  3.4     04-17  Mg     1.8     04-17    Urinalysis Basic - ( 17 Apr 2025 06:00 )    Color: x / Appearance: x / SG: x / pH: x  Gluc: 88 mg/dL / Ketone: x  / Bili: x / Urobili: x   Blood: x / Protein: x / Nitrite: x   Leuk Esterase: x / RBC: x / WBC x   Sq Epi: x / Non Sq Epi: x / Bacteria: x          RADIOLOGY & ADDITIONAL TESTS:  < from: Xray Chest 2 Views PA/Lat (04.16.25 @ 00:01) >  IMPRESSION:    New small bilateral pleural effusions. Mild bibasilar atelectasis. No   pneumonia.    --- End of Report ---      ALLYSON VELASCO MD; AttendingRadiologist  This document has been electronically signed. Apr 16 2025 11:21AM    < end of copied text >

## 2025-04-17 NOTE — PROGRESS NOTE ADULT - SUBJECTIVE AND OBJECTIVE BOX
CC: Impaired mobility    Patient reports strength and mobility improving.  She is motivated to continue to improve and participate in rehab.  No new worsening of weakness.     REVIEW OF SYSTEMS  Constitutional - No fever,  + fatigue  HEENT - No neck pain  Neurological - + loss of strength  Musculoskeletal - No joint pain    FUNCTIONAL PROGRESS  4/16/25  Bed Mobility  Bed Mobility Training Rehab Potential: good, to achieve stated therapy goals  Bed Mobility Training Sit-to-Supine: minimum assist (75% patient effort);  1 person assist  Bed Mobility Training Supine-to-Sit: minimum assist (75% patient effort);  1 person assist  Bed Mobility Training Limitations: decreased ability to use arms for pushing/pulling;  decreased ability to use legs for bridging/pushing;  impaired ability to control trunk for mobility;  decreased strength;  impaired balance    Sit-Stand Transfer Training  Sit-to-Stand Transfer Training Rehab Potential: good, to achieve stated therapy goals  Transfer Training Sit-to-Stand Transfer: minimum assist (75% patient effort);  PT assist  Transfer Training Stand-to-Sit Transfer: minimum assist (75% patient effort);  1 person assist;  PT assist  Sit-to-Stand Transfer Training Transfer Safety Analysis: decreased weight-shifting ability;  decreased strength;  impaired balance    Gait Training  Gait Training Rehab Potential: good, to achieve stated therapy goals  Gait Training: minimum assist (75% patient effort);  1 person assist;  PT assist;  35ft  Gait Analysis: decreased step length;  decreased stride length;  decreased weight-shifting ability;  decreased strength;  impaired balance    Therapeutic Exercise  Therapeutic Exercise Rehab Effort: good  Therapeutic Exercise Detail: Pt performs Diego UE/LE AROM therex in functional planes       VITALS  T(C): 36.4 (04-17-25 @ 04:23), Max: 36.9 (04-16-25 @ 20:00)  HR: 80 (04-17-25 @ 04:23) (80 - 99)  BP: 173/82 (04-17-25 @ 04:23) (130/76 - 173/82)  RR: 18 (04-17-25 @ 04:23) (17 - 18)  SpO2: 94% (04-17-25 @ 04:23) (93% - 96%)  Wt(kg): --    MEDICATIONS   acetaminophen     Tablet .. 650 milliGRAM(s) every 6 hours PRN  acetaminophen   IVPB .. 675 milliGRAM(s) once  bisacodyl Suppository 10 milliGRAM(s) daily PRN  chlorhexidine 2% Cloths 1 Application(s) daily  enoxaparin Injectable 30 milliGRAM(s) every 24 hours  hydrochlorothiazide 25 milliGRAM(s) daily  influenza  Vaccine (HIGH DOSE) 0.5 milliLiter(s) once  lisinopril 20 milliGRAM(s) daily  pantoprazole  Injectable 40 milliGRAM(s) daily  polyethylene glycol 3350 17 Gram(s) daily  tamsulosin 0.4 milliGRAM(s) at bedtime      RECENT LABS/IMAGING - Reviewed                        12.4   7.89  )-----------( 260      ( 17 Apr 2025 06:00 )             37.0     04-17    139  |  101  |  8.0  ----------------------------<  88  3.6   |  25.0  |  0.52    Ca    8.8      17 Apr 2025 06:00  Phos  3.4     04-17  Mg     1.8     04-17        Urinalysis Basic - ( 17 Apr 2025 06:00 )    Color: x / Appearance: x / SG: x / pH: x  Gluc: 88 mg/dL / Ketone: x  / Bili: x / Urobili: x   Blood: x / Protein: x / Nitrite: x   Leuk Esterase: x / RBC: x / WBC x   Sq Epi: x / Non Sq Epi: x / Bacteria: x      PHYSICAL EXAM  Constitutional - NAD, Comfortable   Extremities - No edema  Neurologic Exam -                    Cognitive - Alert and oriented x3.       Communication - Fluent, No dysarthria     Motor - 4/5 throughout      Sensory - Intact to LT  Psychiatric - Mood stable, Affect WNL  ----------------------------------------------------------------------------------------  ASSESSMENT/PLAN  74yFemale with functional deficits after ICH  Pain - Tylenol  DVT PPX - SCDs, Lovenox   HTN - HCTZ, Lisinopril, internal medicine following   Rehab/Impaired mobility and function - Continue PT/OT.  Recommend acute rehabilitation, patient will be able to tolerate 3 hours of therapy/day.

## 2025-04-17 NOTE — PROGRESS NOTE ADULT - ASSESSMENT
74-year-old female PMH Raynaud's and HTN (off BP medication x 2 years) presents as a transfer from [facility] for ICH. Transferred to [facility], cavernoma vs ICH.     PLAN:  - Q4 neuro checks and vitals  - MBS eval complete pt approved for regular diet  - Medicine reccs appreciated  - Diet - Regular DASH/TLC, supervised meals  - Cardiology recs appreciated  - Increased to lisinopril 20mg BID for HTN w/ hydralazine PO CO  - OOB as tolerated/PT   - LBM 4/13    - Suppository today  - SCDs in bed, on DVT ppx with SQL  - Pending placement to [facility]  - PM&R recs appreciated  - Case and plan to be discussed with Dr. Farmer in AM rounds

## 2025-04-18 ENCOUNTER — INPATIENT (INPATIENT)
Facility: HOSPITAL | Age: 75
LOS: 13 days | Discharge: HOME | DRG: 66 | End: 2025-05-02
Attending: PHYSICAL MEDICINE & REHABILITATION | Admitting: PHYSICAL MEDICINE & REHABILITATION
Payer: MEDICARE

## 2025-04-18 VITALS
OXYGEN SATURATION: 96 % | SYSTOLIC BLOOD PRESSURE: 147 MMHG | HEART RATE: 81 BPM | WEIGHT: 103.84 LBS | DIASTOLIC BLOOD PRESSURE: 81 MMHG | HEIGHT: 60 IN | RESPIRATION RATE: 16 BRPM | TEMPERATURE: 98 F

## 2025-04-18 VITALS
DIASTOLIC BLOOD PRESSURE: 76 MMHG | TEMPERATURE: 98 F | HEART RATE: 85 BPM | SYSTOLIC BLOOD PRESSURE: 130 MMHG | RESPIRATION RATE: 18 BRPM | OXYGEN SATURATION: 97 %

## 2025-04-18 DIAGNOSIS — Z98.890 OTHER SPECIFIED POSTPROCEDURAL STATES: Chronic | ICD-10-CM

## 2025-04-18 DIAGNOSIS — I63.9 CEREBRAL INFARCTION, UNSPECIFIED: ICD-10-CM

## 2025-04-18 LAB — SARS-COV-2 RNA SPEC QL NAA+PROBE: SIGNIFICANT CHANGE UP

## 2025-04-18 PROCEDURE — 80061 LIPID PANEL: CPT

## 2025-04-18 PROCEDURE — 83036 HEMOGLOBIN GLYCOSYLATED A1C: CPT

## 2025-04-18 PROCEDURE — 71045 X-RAY EXAM CHEST 1 VIEW: CPT

## 2025-04-18 PROCEDURE — 85730 THROMBOPLASTIN TIME PARTIAL: CPT

## 2025-04-18 PROCEDURE — 84484 ASSAY OF TROPONIN QUANT: CPT

## 2025-04-18 PROCEDURE — 93306 TTE W/DOPPLER COMPLETE: CPT

## 2025-04-18 PROCEDURE — 99285 EMERGENCY DEPT VISIT HI MDM: CPT | Mod: 25

## 2025-04-18 PROCEDURE — 93970 EXTREMITY STUDY: CPT

## 2025-04-18 PROCEDURE — 84436 ASSAY OF TOTAL THYROXINE: CPT

## 2025-04-18 PROCEDURE — 87640 STAPH A DNA AMP PROBE: CPT

## 2025-04-18 PROCEDURE — 85379 FIBRIN DEGRADATION QUANT: CPT

## 2025-04-18 PROCEDURE — 84480 ASSAY TRIIODOTHYRONINE (T3): CPT

## 2025-04-18 PROCEDURE — 74230 X-RAY XM SWLNG FUNCJ C+: CPT

## 2025-04-18 PROCEDURE — 82550 ASSAY OF CK (CPK): CPT

## 2025-04-18 PROCEDURE — 71046 X-RAY EXAM CHEST 2 VIEWS: CPT

## 2025-04-18 PROCEDURE — 93005 ELECTROCARDIOGRAM TRACING: CPT

## 2025-04-18 PROCEDURE — 80048 BASIC METABOLIC PNL TOTAL CA: CPT

## 2025-04-18 PROCEDURE — 82962 GLUCOSE BLOOD TEST: CPT

## 2025-04-18 PROCEDURE — 86900 BLOOD TYPING SEROLOGIC ABO: CPT

## 2025-04-18 PROCEDURE — 85610 PROTHROMBIN TIME: CPT

## 2025-04-18 PROCEDURE — 70553 MRI BRAIN STEM W/O & W/DYE: CPT | Mod: MC

## 2025-04-18 PROCEDURE — 84443 ASSAY THYROID STIM HORMONE: CPT

## 2025-04-18 PROCEDURE — 80053 COMPREHEN METABOLIC PANEL: CPT

## 2025-04-18 PROCEDURE — 70450 CT HEAD/BRAIN W/O DYE: CPT | Mod: MC

## 2025-04-18 PROCEDURE — 85027 COMPLETE CBC AUTOMATED: CPT

## 2025-04-18 PROCEDURE — A9579: CPT

## 2025-04-18 PROCEDURE — 86901 BLOOD TYPING SEROLOGIC RH(D): CPT

## 2025-04-18 PROCEDURE — 97116 GAIT TRAINING THERAPY: CPT

## 2025-04-18 PROCEDURE — 97530 THERAPEUTIC ACTIVITIES: CPT

## 2025-04-18 PROCEDURE — 99221 1ST HOSP IP/OBS SF/LOW 40: CPT

## 2025-04-18 PROCEDURE — 84100 ASSAY OF PHOSPHORUS: CPT

## 2025-04-18 PROCEDURE — 84439 ASSAY OF FREE THYROXINE: CPT

## 2025-04-18 PROCEDURE — 99232 SBSQ HOSP IP/OBS MODERATE 35: CPT

## 2025-04-18 PROCEDURE — 97163 PT EVAL HIGH COMPLEX 45 MIN: CPT

## 2025-04-18 PROCEDURE — 92611 MOTION FLUOROSCOPY/SWALLOW: CPT

## 2025-04-18 PROCEDURE — 97535 SELF CARE MNGMENT TRAINING: CPT

## 2025-04-18 PROCEDURE — 85025 COMPLETE CBC W/AUTO DIFF WBC: CPT

## 2025-04-18 PROCEDURE — 97167 OT EVAL HIGH COMPLEX 60 MIN: CPT

## 2025-04-18 PROCEDURE — 86850 RBC ANTIBODY SCREEN: CPT

## 2025-04-18 PROCEDURE — 36415 COLL VENOUS BLD VENIPUNCTURE: CPT

## 2025-04-18 PROCEDURE — 83735 ASSAY OF MAGNESIUM: CPT

## 2025-04-18 PROCEDURE — 87641 MR-STAPH DNA AMP PROBE: CPT

## 2025-04-18 RX ORDER — BISACODYL 5 MG
5 TABLET, DELAYED RELEASE (ENTERIC COATED) ORAL EVERY 12 HOURS
Refills: 0 | Status: DISCONTINUED | OUTPATIENT
Start: 2025-04-18 | End: 2025-04-23

## 2025-04-18 RX ORDER — POLYETHYLENE GLYCOL 3350 17 G/17G
17 POWDER, FOR SOLUTION ORAL DAILY
Refills: 0 | Status: DISCONTINUED | OUTPATIENT
Start: 2025-04-18 | End: 2025-04-20

## 2025-04-18 RX ORDER — TAMSULOSIN HYDROCHLORIDE 0.4 MG/1
0.4 CAPSULE ORAL AT BEDTIME
Refills: 0 | Status: DISCONTINUED | OUTPATIENT
Start: 2025-04-18 | End: 2025-04-25

## 2025-04-18 RX ORDER — POLYETHYLENE GLYCOL 3350 17 G/17G
17 POWDER, FOR SOLUTION ORAL
Qty: 0 | Refills: 0 | DISCHARGE
Start: 2025-04-18

## 2025-04-18 RX ORDER — ENOXAPARIN SODIUM 100 MG/ML
30 INJECTION SUBCUTANEOUS
Qty: 0 | Refills: 0 | DISCHARGE
Start: 2025-04-18

## 2025-04-18 RX ORDER — ACETAMINOPHEN 500 MG/5ML
650 LIQUID (ML) ORAL EVERY 6 HOURS
Refills: 0 | Status: DISCONTINUED | OUTPATIENT
Start: 2025-04-18 | End: 2025-05-02

## 2025-04-18 RX ORDER — SENNA 187 MG
2 TABLET ORAL AT BEDTIME
Refills: 0 | Status: DISCONTINUED | OUTPATIENT
Start: 2025-04-18 | End: 2025-04-23

## 2025-04-18 RX ORDER — TAMSULOSIN HYDROCHLORIDE 0.4 MG/1
1 CAPSULE ORAL
Qty: 0 | Refills: 0 | DISCHARGE
Start: 2025-04-18

## 2025-04-18 RX ORDER — HYDROCHLOROTHIAZIDE 50 MG/1
1 TABLET ORAL
Qty: 0 | Refills: 0 | DISCHARGE
Start: 2025-04-18

## 2025-04-18 RX ORDER — ACETAMINOPHEN 500 MG/5ML
2 LIQUID (ML) ORAL
Qty: 0 | Refills: 0 | DISCHARGE
Start: 2025-04-18

## 2025-04-18 RX ORDER — TOUCHLESS CARE ZINC OXIDE PROTECTANT 20; 25 G/100G; G/100G
1 SPRAY TOPICAL DAILY
Refills: 0 | Status: DISCONTINUED | OUTPATIENT
Start: 2025-04-18 | End: 2025-05-02

## 2025-04-18 RX ORDER — MELATONIN 5 MG
3 TABLET ORAL AT BEDTIME
Refills: 0 | Status: DISCONTINUED | OUTPATIENT
Start: 2025-04-18 | End: 2025-05-02

## 2025-04-18 RX ORDER — LISINOPRIL 5 MG/1
1 TABLET ORAL
Qty: 0 | Refills: 0 | DISCHARGE
Start: 2025-04-18

## 2025-04-18 RX ORDER — ENOXAPARIN SODIUM 100 MG/ML
30 INJECTION SUBCUTANEOUS EVERY 24 HOURS
Refills: 0 | Status: DISCONTINUED | OUTPATIENT
Start: 2025-04-18 | End: 2025-05-02

## 2025-04-18 RX ORDER — POLYETHYLENE GLYCOL 3350 17 G/17G
17 POWDER, FOR SOLUTION ORAL DAILY
Refills: 0 | Status: DISCONTINUED | OUTPATIENT
Start: 2025-04-18 | End: 2025-04-18

## 2025-04-18 RX ORDER — LISINOPRIL 5 MG/1
20 TABLET ORAL
Refills: 0 | Status: DISCONTINUED | OUTPATIENT
Start: 2025-04-18 | End: 2025-04-22

## 2025-04-18 RX ADMIN — Medication 500000 UNIT(S): at 17:25

## 2025-04-18 RX ADMIN — LISINOPRIL 20 MILLIGRAM(S): 5 TABLET ORAL at 17:25

## 2025-04-18 RX ADMIN — Medication 1 APPLICATION(S): at 05:30

## 2025-04-18 RX ADMIN — LISINOPRIL 20 MILLIGRAM(S): 5 TABLET ORAL at 05:50

## 2025-04-18 RX ADMIN — Medication 2 TABLET(S): at 21:20

## 2025-04-18 RX ADMIN — Medication 40 MILLIGRAM(S): at 12:23

## 2025-04-18 RX ADMIN — TAMSULOSIN HYDROCHLORIDE 0.4 MILLIGRAM(S): 0.4 CAPSULE ORAL at 21:20

## 2025-04-18 NOTE — PROGRESS NOTE ADULT - SUBJECTIVE AND OBJECTIVE BOX
HPI: Patient is a 74-year-old female PMH Raynaud's HTN (off BP medication x 2 years) presents as a transfer from [facility] for ICH. Patient states that she woke up this morning around 7AM with no issues, then around 10:30AM she was brushing her teeth when she had a sudden onset of dizziness, nausea, episode of vomiting and laid down on her bed. Her son found her at 2:30PM laying down while she was still complaining of dizziness, and also was experiencing mouth numbness, right sided numbness and double vision. She was brought to [facility] via ambulance and on arrival she was hypertensive to 's requiring multiple pushes of IV medications and ultimately required a Cardene drip. 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 She was transferred to [facility] for neurosurgical evaluation. On arrival patient complaining of dizziness, right hand and foot decreased sensation, and double vision. Patient denies any AC/AP use and denies any recent trauma. Neurosurgery consulted and patient will be admitted to NSICU for close monitoring. (09 Apr 2025 21:51)      INTERVAL HPI/OVERNIGHT EVENTS:  75y Female seen lying comfortably in bed. Now on lisinopril 20 BID. Odell removed yesterday, pt voiding.     Vital Signs Last 24 Hrs  T(C): 36.5 (18 Apr 2025 08:26), Max: 36.9 (17 Apr 2025 21:39)  T(F): 97.7 (18 Apr 2025 08:26), Max: 98.4 (17 Apr 2025 21:39)  HR: 74 (18 Apr 2025 08:26) (74 - 94)  BP: 171/88 (18 Apr 2025 08:26) (124/78 - 171/88)  BP(mean): --  RR: 18 (18 Apr 2025 08:26) (17 - 18)  SpO2: 98% (18 Apr 2025 08:26) (94% - 98%)    Parameters below as of 18 Apr 2025 08:26  Patient On (Oxygen Delivery Method): room air      PHYSICAL EXAM:  GENERAL: NAD, well-groomed  HEAD:  Atraumatic, normocephalic  RAJWINDER COMA SCORE: E- V- M- = 15  MENTAL STATUS: AAO x3; Awake; Opens eyes spontaneously; Appropriately conversant without aphasia; following simple commands  CRANIAL NERVES: L eye CN 6 palsy with intermittent patching PERRL. Face symmetric w/ normal eye closure and smile, tongue midline. Hearing grossly intact. Speech clear. Head turning and shoulder shrug intact.   MOTOR: strength 5/5 b/l upper and lower extremities  SENSATION: grossly intact to light touch all extremities  Pulm: equal bilateral chest rise, no accessory muscle usage, no cough  EXTREMITIES:  no clubbing, cyanosis, or edema  SKIN: Warm, dry      LABS:                        12.4   7.89  )-----------( 260      ( 17 Apr 2025 06:00 )             37.0     04-17    139  |  101  |  8.0  ----------------------------<  88  3.6   |  25.0  |  0.52    Ca    8.8      17 Apr 2025 06:00  Phos  3.4     04-17  Mg     1.8     04-17        Urinalysis Basic - ( 17 Apr 2025 06:00 )    Color: x / Appearance: x / SG: x / pH: x  Gluc: 88 mg/dL / Ketone: x  / Bili: x / Urobili: x   Blood: x / Protein: x / Nitrite: x   Leuk Esterase: x / RBC: x / WBC x   Sq Epi: x / Non Sq Epi: x / Bacteria: x        04-17 @ 07:01  -  04-18 @ 07:00  --------------------------------------------------------  IN: 100 mL / OUT: 0 mL / NET: 100 mL

## 2025-04-18 NOTE — H&P ADULT - HISTORY OF PRESENT ILLNESS
Michell Neves is a 74 year-old female PMHx of HTN and Raynaud's phenomenon who presented to [facility] ED on 4/9 for dizziness, nausea, and vomiting followed by facial droop and right sided weakness w/ diplopia. Patient found to have SBP over 200mmHg and started on cardene gtt. CTH w/o contrast demonstrated acute pontine hemorrhage and was transferred to [facility] for neurosurgical evaluation. No surgical intervention required. Patient admitted to NSICU and was monitored and treated with antihypertensives. Patient with initial dysphagia but MBS performed and cleared for regular diet w/ thin fluids. Course complicated by urinary retention. TOV initiated on 4/16 and patient started on flomax for a course of at least 3 weeks. Chest pain earlier in admission but workup negative for cardiac original likely related to hypertensive emergency. Leukocytosis now resolved. CXR performed with small bilateral pleural effusions and atelectasis not treated with antibiotics. Patient seen by PT/OT and physiatry recommending acute inpatient rehabilitation. Patient discharged to [facility] on 4/18.

## 2025-04-18 NOTE — H&P ADULT - NSHPSOCIALHISTORY_GEN_ALL_CORE
Smoking - Denied  EtOH - Denied   Drugs - Denied     Lives alone in , son lives next door.   Independent in ADLs and ambulation PTA    CURRENT FUNCTIONAL STATUS  4/16/25  Bed Mobility  Bed Mobility Training Rehab Potential: good, to achieve stated therapy goals  Bed Mobility Training Sit-to-Supine: minimum assist (75% patient effort);  1 person assist  Bed Mobility Training Supine-to-Sit: minimum assist (75% patient effort);  1 person assist  Bed Mobility Training Limitations: decreased ability to use arms for pushing/pulling;  decreased ability to use legs for bridging/pushing;  impaired ability to control trunk for mobility;  decreased strength;  impaired balance    Sit-Stand Transfer Training  Sit-to-Stand Transfer Training Rehab Potential: good, to achieve stated therapy goals  Transfer Training Sit-to-Stand Transfer: minimum assist (75% patient effort);  PT assist  Transfer Training Stand-to-Sit Transfer: minimum assist (75% patient effort);  1 person assist;  PT assist  Sit-to-Stand Transfer Training Transfer Safety Analysis: decreased weight-shifting ability;  decreased strength;  impaired balance    Gait Training  Gait Training Rehab Potential: good, to achieve stated therapy goals  Gait Training: minimum assist (75% patient effort);  1 person assist;  PT assist;  35ft  Gait Analysis: decreased step length;  decreased stride length;  decreased weight-shifting ability;  decreased strength;  impaired balance    Therapeutic Exercise  Therapeutic Exercise Rehab Effort: good  Therapeutic Exercise Detail: Pt performs Diego UE/LE AROM therex in functional planes

## 2025-04-18 NOTE — H&P ADULT - NSHPLABSRESULTS_GEN_ALL_CORE
< from: Xray Chest 2 Views PA/Lat (04.16.25 @ 00:01) >    IMPRESSION:    New small bilateral pleural effusions. Mild bibasilar atelectasis. No   pneumonia.    --- End of Report ---    < end of copied text >    < from: Xray Cinesophagram Swallow Function w/ Contrast (04.14.25 @ 11:17) >    IMPRESSION: Please refer to dedicated report from speech pathology.    < end of copied text >    < from: US Duplex Venous Lower Ext Complete, Bilateral (04.10.25 @ 11:14) >    IMPRESSION:  No evidence of deep venous thrombosis in either lower extremity.    < end of copied text >    < from: MR Head w/wo IV Cont (04.10.25 @ 02:03) >    IMPRESSION:  2 cm hemorrhage within the mid to posterior chloe with   punctate hemorrhage centrally which may reflect a focal cavernoma.  Mild   chronic periventricular white matter ischemia.    < end of copied text >      < from: CT Head No Cont (04.10.25 @ 01:16) >      Comparison is made with prior CT of 4/9/2025 at 7:02 PM    Allowing for difference in slice angulation the left dorsal pontine   hemorrhage is unchanged measuring approximately 2 cm in craniocaudal   diameter. There is slight mass effect on the quadrigeminal plate cistern.   There is no hydrocephalus. Sulci normal for the patient's age. Small   vessel white matter ischemic changes are noted.    IMPRESSION: 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    --- End of Report ---    < end of copied text >    < from: Xray Chest 1 View- PORTABLE-Urgent (04.09.25 @ 19:33) >      IMPRESSION:  Interstitial lung disease without large airspace consolidation or   effusion.    --- End of Report ---    < end of copied text >

## 2025-04-18 NOTE — PATIENT PROFILE ADULT - FALL HARM RISK - HARM RISK INTERVENTIONS

## 2025-04-18 NOTE — H&P ADULT - ASSESSMENT
Assessment/Plan:  JANET BUSTILLOS is a 75y with HTN and Raynaud's phenomenon admitted to 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  Hospital Course complicated by urinary retention, leukocytosis, and chest pain. Patient now admitted for a multidisciplinary rehab program. 04-18-25.    Acute left dorsal pontine hemorrhage   -imaging demonstrated acute left pontine hemorrhage in setting of hypertensive emergency  -initially managed with cardene and adjusted to antihypertensive outlined below  -No PFO present  -evaluated by neurosurgical team and temporarily managed in NSICU - no surgical intervention required   -deficits include right sided hemiparesis, facial droop, dysphagia, dysarthria    Comprehensive Multidisciplinary Rehab Program:  - Start comprehensive rehab program of PT/OT/SLP - 3 hours a day, 5 days a week. P&O as needed   _______________________________________________________________________________  CONCURRENT MEDICAL PROBLEMS    HTN  -   -TTE performed: negative for PFO w/ EF 70-75% and mild aortic stenosis -> recommend cardiac follow up outpatient   - Monitor BP    HLD  - cont statin    T2DM  - Lantus  - Premeal  - SSI  - Monitor fingersticks    Pain  - Tylenol PRN  - Avoid sedating medications that may interfere with cognitive recovery    Mood / Cognition  - Neuropsychology consult  - [ ] Enhanced Supervision  [ ] Constant Observation    Sleep  - Maintain quiet hours and a low stim environment.   - Monitor sleep logs (for BIU)  - Melatonin PRN     GI / Bowel  - Senna qHS  - Miralax PRN Daily  - GI ppx: ***     / Bladder  - Currently patient voids:      [ ] independent      [ ] external collection device (condom cath)      [ ] Indwelling holden catheter      [ ] Intermittent catheterization  - Continue bladder scans Q8 hours with straight cath for >400cc.  - Toileting schedule every 4 hours    Skin / Pressure injury  - Skin assessment on admission performed [  ] :   - Monitor Incisions:    - Turn q2 hours in bed while awake, air mattress  - nursing to monitor skin qShift  - soft heel protectors  - skin barrier cream PRN    Diet/Dysphagia:  - Diet Consistency: ***  - Supplements: ***  - Aspiration Precautions  - SLP consult for swallow function evaluation and treatment  - Nutrition consult    DVT prophylaxis:   - *****  - SCDs  - Last Doppler on ***       Outpatient Follow-up:  ** Specialty and Name of physician      Code Status/Emergency Contact:      ---------------    Goals: Safe discharge to home  Estimated Length of Stay: 10-14 days  Rehab Potential: Good  Medical Prognosis: Good  Estimated Disposition: Home with home care      PRESCREEN COMPARISON:  I have reviewed the prescreen information and I have found no relevant changes between the preadmission screening and my post admission evaluation.    RATIONALE FOR INPATIENT ADMISSION: Patient demonstrates the following:  [X] Medically appropriate for rehabilitation admission  [X] Has attainable rehab goals with an appropriate initial discharge plan  [X]Has rehabilitation potential (expected to make a significant improvement within a reasonable period of time)  [X] Requires close medical management by a rehab physician, rehab nursing care, Hospitalist and comprehensive interdisciplinary team (including PT, OT and/or SLP, Prosthetics and Orthotics)     Assessment/Plan:  JANET BUSTILLOS is a 75y with HTN and Raynaud's phenomenon admitted to [facility] for 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  Hospital Course complicated by urinary retention, leukocytosis, and chest pain. Patient now admitted for a multidisciplinary rehab program.     Acute left dorsal pontine hemorrhage   -imaging demonstrated acute left pontine hemorrhage in setting of hypertensive emergency likely due to long term uncontrolled hypertension  -initially managed with cardene gtt and adjusted to antihypertensive outlined below  -goal SBP under 160mmHg  -No PFO present on TTE  -evaluated by neurosurgical team and temporarily managed in NSICU - no surgical intervention required   - Keep bed head at 35 degrees all the times and 90 degrees with food out of bed  -dysphagia reevaluated with MBS and upgraded to regular diet with thin liquids  -approved for DVT chemoprophylaxis by neurosurgery   -requires repeat MRI of the head in 6 weeks and outpatient neurosurgery follow up   -deficits include right sided hemiparesis, facial droop, dysphagia, dysarthria    Comprehensive Multidisciplinary Rehab Program:   -Comprehensive rehab program of PT/OT/SLP - 3 hours a day, 5 days a week.   -PT: strengthening, coordination, balance, endurance, gait,  -OT: strengthening, coordination, fine motor, ADLs  -SLP: dysphagia, dysarthria, memory impairment  _______________________________________________________________________________  CONCURRENT MEDICAL PROBLEMS    HTN  - goal BP under 160mmHg  -TTE performed: negative for PFO w/ EF 70-75% and mild aortic stenosis -> recommend cardiac follow up outpatient   - lisinopril 20mg daily  - HCTZ 25mg daily  - Monitor BP    Pain  - Tylenol PRN  - Avoid sedating medications that may interfere with cognitive recovery    Sleep  - Maintain quiet hours and a low stim environment.   - Melatonin PRN     GI / Bowel  - Senna qHS  - Miralax Daily  - bisacodyl prn q12h 5mg po     / Bladder  Urinary Retention  - Currently patient voids: independently  - Continue bladder scans Q8 hours with straight cath for >400cc.  - required holden placement previously and TOV started 4/16  - continue flomax 0.4mg qhs     Skin / Pressure injury  - Skin assessment on admission performed [  ] :   - Monitor Incisions:    - Turn q2 hours in bed while awake, air mattress  - nursing to monitor skin qShift  - soft heel protectors  - skin barrier cream PRN    Diet/Dysphagia:  - Diet Consistency: DASH diet, regular consistency and thin liquids   - Supplements: B12 1000mcg daily, Vit D 25mcg daily  - Aspiration Precautions  - SLP consult for swallow function evaluation and treatment  - Nutrition consult    DVT prophylaxis:   - lovenox 30mg subq daily  - Last Doppler BLE on 4/10 negative for DVTs      Outpatient Follow-up:  Logan Farmerul  Neurosurgery  [address] [place] [id number]  Phone: [phone]  Fax: [phone]  Follow Up Time: 1       Code Status/Emergency Contact:  Ct Agrawal [phone]    ---------------    Goals: Safe discharge to home  Estimated Length of Stay: 10-14 days  Rehab Potential: Good  Medical Prognosis: Good  Estimated Disposition: Home with home care      PRESCREEN COMPARISON:  I have reviewed the prescreen information and I have found no relevant changes between the preadmission screening and my post admission evaluation.    RATIONALE FOR INPATIENT ADMISSION: Patient demonstrates the following:  [X] Medically appropriate for rehabilitation admission  [X] Has attainable rehab goals with an appropriate initial discharge plan  [X]Has rehabilitation potential (expected to make a significant improvement within a reasonable period of time)  [X] Requires close medical management by a rehab physician, rehab nursing care, Hospitalist and comprehensive interdisciplinary team (including PT, OT and/or SLP, Prosthetics and Orthotics)     Assessment/Plan:  JANET BUSTILLOS is a 75y with HTN and Raynaud's phenomenon admitted to [facility] for 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  Hospital Course complicated by urinary retention, leukocytosis, and chest pain. Patient now admitted for a multidisciplinary rehab program.     Acute left dorsal pontine hemorrhage   -imaging demonstrated acute left pontine hemorrhage in setting of hypertensive emergency likely due to long term uncontrolled hypertension  -initially managed with cardene gtt and adjusted to antihypertensive outlined below  -goal SBP under 160mmHg  -No PFO present on TTE  -evaluated by neurosurgical team and temporarily managed in NSICU - no surgical intervention required   - Keep bed head at 35 degrees all the times and 90 degrees with food out of bed  -dysphagia reevaluated with MBS and upgraded to regular diet with thin liquids  -approved for DVT chemoprophylaxis by neurosurgery   -requires repeat MRI of the head in 6 weeks and outpatient neurosurgery follow up   -deficits include right sided hemiparesis, facial droop, dysphagia, dysarthria  -Oral thrush present: nystatin mouthwash ordered 4 times a day    Comprehensive Multidisciplinary Rehab Program:   -Comprehensive rehab program of PT/OT/SLP - 3 hours a day, 5 days a week.   -PT: strengthening, coordination, balance, endurance, gait,  -OT: strengthening, coordination, fine motor, ADLs  -SLP: dysphagia, dysarthria, memory impairment  _______________________________________________________________________________  CONCURRENT MEDICAL PROBLEMS    HTN  - goal BP under 160mmHg  -TTE performed: negative for PFO w/ EF 70-75% and mild aortic stenosis -> recommend cardiac follow up outpatient   - lisinopril 20mg daily  - HCTZ 25mg daily  - Monitor BP    Pain  - Tylenol PRN  - Avoid sedating medications that may interfere with cognitive recovery    Sleep  - Maintain quiet hours and a low stim environment.   - Melatonin PRN     GI / Bowel  - Senna qHS  - Miralax Daily  - bisacodyl prn q12h 5mg po  - monitor for loose stools     / Bladder  Urinary Retention  - Currently patient voids: independently  - Continue bladder scans Q8 hours with straight cath for >400cc.  - required holden placement previously and TOV started 4/16  - continue flomax 0.4mg qhs     Skin / Pressure injury  - Turn q2 hours in bed while awake, air mattress  - nursing to monitor skin qShift  - soft heel protectors  - skin barrier cream for stage 2 pressure wound sacrum 1x1cm daily    Diet/Dysphagia:  - Diet Consistency: DASH diet, regular consistency and thin liquids   - Supplements: B12 1000mcg daily, Vit D 25mcg daily  - Aspiration Precautions  - SLP consult for swallow function evaluation and treatment  - Nutrition consult    DVT prophylaxis:   - lovenox 30mg subq daily  - Last Doppler BLE on 4/10 negative for DVTs      Outpatient Follow-up:  Logan Farmerul  Neurosurgery  [address] [place] [id number]  Phone: [phone]  Fax: [phone]  Follow Up Time: 1       Code Status/Emergency Contact:  Ct Agrawal [phone]    ---------------    Goals: Safe discharge to home  Estimated Length of Stay: 10-14 days  Rehab Potential: Good  Medical Prognosis: Good  Estimated Disposition: Home with home care      PRESCREEN COMPARISON:  I have reviewed the prescreen information and I have found no relevant changes between the preadmission screening and my post admission evaluation.    RATIONALE FOR INPATIENT ADMISSION: Patient demonstrates the following:  [X] Medically appropriate for rehabilitation admission  [X] Has attainable rehab goals with an appropriate initial discharge plan  [X]Has rehabilitation potential (expected to make a significant improvement within a reasonable period of time)  [X] Requires close medical management by a rehab physician, rehab nursing care, Hospitalist and comprehensive interdisciplinary team (including PT, OT and/or SLP, Prosthetics and Orthotics)     Assessment/Plan:  JANET BUSTILLOS is a 75y with HTN and Raynaud's phenomenon admitted to [facility] for 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  Hospital Course complicated by urinary retention, leukocytosis, and chest pain. Patient now admitted for a multidisciplinary rehab program.     Acute left dorsal pontine hemorrhage   -imaging demonstrated acute left pontine hemorrhage in setting of hypertensive emergency likely due to long term uncontrolled hypertension  -initially managed with cardene gtt and adjusted to antihypertensive outlined below  -goal SBP under 160mmHg  -No PFO present on TTE  -evaluated by neurosurgical team and temporarily managed in NSICU - no surgical intervention required   - Keep bed head at 35 degrees all the times and 90 degrees with food out of bed  -dysphagia reevaluated with MBS and upgraded to regular diet with thin liquids  -approved for DVT chemoprophylaxis by neurosurgery   -requires repeat MRI of the head in 6 weeks and outpatient neurosurgery follow up   -deficits include right sided hemiparesis, facial droop, dysphagia, dysarthria  -Oral thrush present: nystatin mouthwash ordered 4 times a day  -Oral hygiene x 4 daily prior to Nystatin treatment  -Double vision--> Eye patch     Comprehensive Multidisciplinary Rehab Program:   -Comprehensive rehab program of PT/OT/SLP - 3 hours a day, 5 days a week.   -PT: strengthening, coordination, balance, endurance, gait,  -OT: strengthening, coordination, fine motor, ADLs  -SLP: dysphagia, dysarthria, memory impairment  _______________________________________________________________________________  CONCURRENT MEDICAL PROBLEMS    Transaminitis:  - AST/ALT level (04/19) 76/95  - Limit Tylenol use  - Monitor  - Hospitalist consult     HTN  - goal BP under 160mmHg  -TTE performed: negative for PFO w/ EF 70-75% and mild aortic stenosis -> recommend cardiac follow up outpatient   - lisinopril 20mg daily  - HCTZ 25mg daily  - Monitor BP    Pain  - Tylenol PRN  - Avoid sedating medications that may interfere with cognitive recovery    Sleep  - Maintain quiet hours and a low stim environment.   - Melatonin PRN     GI / Bowel  - Senna qHS  - Miralax Daily  - bisacodyl prn q12h 5mg po  - monitor for loose stools     / Bladder  Urinary Retention  - Currently patient voids: independently  - Bladder scans Q8 hours with straight cath for >400cc.  - Required holden placement previously and TOV started 4/16  - Flomax 0.4mg qhs     Skin / Pressure injury  - Turn q2 hours in bed while awake, air mattress  - nursing to monitor skin qShift  - soft heel protectors  - skin barrier cream for stage 2 pressure wound sacrum 1x1cm daily    Diet/Dysphagia:  - Diet Consistency: DASH diet, regular consistency and thin liquids   - Supplements: B12 1000mcg daily, Vit D 25mcg daily  - Aspiration Precautions  - SLP consult for swallow function evaluation and treatment  - Nutrition consult    DVT prophylaxis:   - Lovenox 30mg subq daily  - Last Doppler BLE on 4/10 negative for DVTs      Outpatient Follow-up:  Logan Farmerul  Neurosurgery  [address] [place] [id number]  Phone: [phone]  Fax: [phone]  Follow Up Time: 1       Code Status/Emergency Contact:  Ct Agrawal [phone]    ---------------    Goals: Safe discharge to home  Estimated Length of Stay: 10-14 days  Rehab Potential: Good  Medical Prognosis: Good  Estimated Disposition: Home with home care      PRESCREEN COMPARISON:  I have reviewed the prescreen information and I have found no relevant changes between the preadmission screening and my post admission evaluation.    RATIONALE FOR INPATIENT ADMISSION: Patient demonstrates the following:  [X] Medically appropriate for rehabilitation admission  [X] Has attainable rehab goals with an appropriate initial discharge plan  [X]Has rehabilitation potential (expected to make a significant improvement within a reasonable period of time)  [X] Requires close medical management by a rehab physician, rehab nursing care, Hospitalist and comprehensive interdisciplinary team (including PT, OT and/or SLP, Prosthetics and Orthotics)     Assessment/Plan:  JANET BUSTILLOS is a 75y with HTN and Raynaud's phenomenon admitted to [facility] for 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  Hospital Course complicated by urinary retention, leukocytosis, and chest pain. Patient now admitted for a multidisciplinary rehab program.     Acute left dorsal pontine hemorrhage   -imaging demonstrated acute left pontine hemorrhage in setting of hypertensive emergency likely due to long term uncontrolled hypertension  -initially managed with cardene gtt and adjusted to antihypertensive outlined below  -goal SBP under 160mmHg  -No PFO present on TTE  -evaluated by neurosurgical team and temporarily managed in NSICU - no surgical intervention required   - Keep bed head at 35 degrees all the times and 90 degrees with food out of bed  -dysphagia reevaluated with MBS and upgraded to regular diet with thin liquids  -approved for DVT chemoprophylaxis by neurosurgery   -requires repeat MRI of the head in 6 weeks and outpatient neurosurgery follow up   -deficits include right sided hemiparesis, facial droop, dysphagia, dysarthria  -Oral thrush present: nystatin mouthwash ordered 4 times a day  -Oral hygiene x 4 daily prior to Nystatin treatment  -Double vision--> Eye patch     Comprehensive Multidisciplinary Rehab Program:   -Comprehensive rehab program of PT/OT/SLP - 3 hours a day, 5 days a week.   -PT: strengthening, coordination, balance, endurance, gait,  -OT: strengthening, coordination, fine motor, ADLs  -SLP: dysphagia, dysarthria, memory impairment  _______________________________________________________________________________  CONCURRENT MEDICAL PROBLEMS    Transaminitis:  - AST/ALT level (04/19) 76/95  - Limit Tylenol use  - Monitor  - Hospitalist consult     HTN  - goal BP under 160mmHg  -TTE performed: negative for PFO w/ EF 70-75% and mild aortic stenosis -> recommend cardiac follow up outpatient   - lisinopril 20mg daily  - HCTZ 25mg daily  - Monitor BP    Pain  - Tylenol PRN  - Avoid sedating medications that may interfere with cognitive recovery    Sleep  - Maintain quiet hours and a low stim environment.   - Melatonin PRN     GI / Bowel  - Senna qHS  - Miralax Daily  - bisacodyl prn q12h 5mg po  - monitor for loose stools     / Bladder  Urinary Retention  - Currently patient voids: independently  - Bladder scans Q8 hours with straight cath for >400cc.  - Required holden placement previously and TOV started 4/16  - Flomax 0.4mg qhs     Skin / Pressure injury  - Turn q2 hours in bed while awake, air mattress  - nursing to monitor skin qShift  - soft heel protectors  - skin barrier cream for stage 2 pressure wound right buttock 1x1cm daily    Diet/Dysphagia:  - Diet Consistency: DASH diet, regular consistency and thin liquids   - Supplements: B12 1000mcg daily, Vit D 25mcg daily  - Aspiration Precautions  - SLP consult for swallow function evaluation and treatment  - Nutrition consult    DVT prophylaxis:   - Lovenox 30mg subq daily  - Last Doppler BLE on 4/10 negative for DVTs      Outpatient Follow-up:  Logan Farmerul  Neurosurgery  [address] [place] [id number]  Phone: [phone]  Fax: [phone]  Follow Up Time: 1       Code Status/Emergency Contact:  Ct Agrawal [phone]    ---------------    Goals: Safe discharge to home  Estimated Length of Stay: 10-14 days  Rehab Potential: Good  Medical Prognosis: Good  Estimated Disposition: Home with home care      PRESCREEN COMPARISON:  I have reviewed the prescreen information and I have found no relevant changes between the preadmission screening and my post admission evaluation.    RATIONALE FOR INPATIENT ADMISSION: Patient demonstrates the following:  [X] Medically appropriate for rehabilitation admission  [X] Has attainable rehab goals with an appropriate initial discharge plan  [X]Has rehabilitation potential (expected to make a significant improvement within a reasonable period of time)  [X] Requires close medical management by a rehab physician, rehab nursing care, Hospitalist and comprehensive interdisciplinary team (including PT, OT and/or SLP, Prosthetics and Orthotics)

## 2025-04-18 NOTE — PROGRESS NOTE ADULT - NUTRITIONAL ASSESSMENT
This patient has been assessed with a concern for Malnutrition and has been determined to have a diagnosis/diagnoses of Moderate protein-calorie malnutrition and Underweight (BMI < 19).    This patient is being managed with:   Diet Regular-  DASH/TLC {Sodium & Cholesterol Restricted} (DASH)  Entered: Apr 14 2025 11:57AM  
This patient has been assessed with a concern for Malnutrition and has been determined to have a diagnosis/diagnoses of Moderate protein-calorie malnutrition and Underweight (BMI < 19).    This patient is being managed with:   Diet Regular-  DASH/TLC {Sodium & Cholesterol Restricted} (DASH)  Entered: Apr 14 2025 11:57AM

## 2025-04-18 NOTE — PROGRESS NOTE ADULT - ASSESSMENT
74-year-old female PMH Raynaud's and HTN (off BP medication x 2 years) presents as a transfer from [facility] for ICH. Transferred to [facility], cavernoma vs ICH.     PLAN:  - Pt stable for discharge from neurosurgical standpoint  - Q4 neuro checks and vitals  - Medicine reccs appreciated  - Diet - Regular DASH/TLC, supervised meals  - Increased to lisinopril 20mg BID for HTN w/ hydralazine PO PRN  - OOB as tolerated/PT   - LBM 4/18  - SCDs in bed, on DVT ppx with SQL  - Pending placement to [place], will f/u w/CM  - Discussed w/ Dr. Farmer. Pt will need follow up in 6 weeks with repeat MRI

## 2025-04-18 NOTE — PROGRESS NOTE ADULT - ASSESSMENT
73 y/o Female with PMH Raynaud's and HTN presented to [facility] c/o visual changes, R sided numbness, and dizziness. Neurosurgery consulted for CTH with acute 2 x 1.5cm chloe IPH. Patient transferred to [facility] for Neurosurgical ICU admission and further management.    Intraparenchymal Hemorrhage  - CTH with acute 2 x 1.5cm chloe IPH, 04/09  - Repeat CTH on 04/10- no changes   - Neurologically stable   - Aspiration Precautions  - Seizure Precautions  - c/w PT/OT recs  - Downgrade to Neurosurgery Step down unit----> on 4/14   to telemetry   - PRN Tylenol 975mg q6hr   - Neurologically stable   - dispo - rehab       Dysphagia, MBS done - diet advanced to regular with fluids   - Aspiration precautions       Urinary retention  - Odell placed on 4/13- removed on 4/16 , voiding  - Flomax 0.4 mg qhs - continue for now and d/c in 3 wks ,     if any urinary issue may see PCP/ as on outpatient   - BM goal - daily , last BM on 4/17    Hx of Raynaud's- never seen by rheum - recommended to see as on outpatient     Chest pain on 4/13 , while having SBP in 180's-190's - seen and evaluated by cardio ,   Trop x2 negative , EKG without acute changes - chest pain likely due to Hypertensive urgency .   Chest pain resolved with BP control . Started on Lisinopril 10 mg daily with parameters , today increased to 20 mg daily   SBP goal< 160 ,   diet changed to low soldium diet     Hx of HTN- was on Lisinopril in the past which she discontinued on her own 2 yrs ago as BP was on lower side as per patient   never f/u with her PCP since   - Maintain SBPs per Neurosx < 160 , now on Lisinopril 20 mg BID  / HCTZ 25 mg daily -   - Overnight BP better controlled , this am noted with episode of '     I  RECHECK bp MY SELF 117/ 68 , I tried not to have my white coat on ,     Likely patient with white coat syndrome . Asymptomatic .     I think it is better for patient to be discharged from hospital     D/C ON LISINOPRIL 20 MG BID AND HCTZ 25 mg Daily     CONTINUE LOW SODIUM DIET     PATIENT ADVISED TO F/U WITH PCP once d/c from rehab     - TTE - noted , EF - 70%-75% , Mild to moderate aortic stenosis.  - EKG noted T wave abnormality, consider anterolateral ischemia,     patient is asymptomatic , will recommend CARDIOLOGY EVAL AS ON OUTPATIENT TO R/O ISCHEMIA   - Cardiac monitor reviewed - NSR in 80's    continue to monitor     Last BM 4/17  - Miralax added by me - continue   CONTINUE  DULCOLAX SUPPOSITORY PRN     Hypoglycemia- RESOLVED WITH DIET AND IVF     Metabolic acidosis - resolved   check BMP in am     Leucocytosis - WBC noted trending up from 8 to 14 -->  13 k , today resolved   No S/S of infection   CXR with ? PNA vs atelectasis   CXR repeated and PNA R/O ,   New small bilateral pleural effusions. Mild bibasilar atelectasis  Continue HCTZ DAILY ,   IS-    Hypomagnesemia/ Hypophosphatemia/ hypokalemia - supplemented       PPX  - SCD's  - Lovenoxas per NS     Dispo plan rehab     D/W NS PA - made aware of plan .   No medical contraindications to be d/c to rehab today

## 2025-04-18 NOTE — PROGRESS NOTE ADULT - PROVIDER SPECIALTY LIST ADULT
Hospitalist
Neurosurgery
Hospitalist
Neurosurgery
Physiatry
Physiatry
Rehab Medicine
Rehab Medicine
Internal Medicine
Neurosurgery
Hospitalist
Neurosurgery
Neurosurgery

## 2025-04-18 NOTE — H&P ADULT - NSHPREVIEWOFSYSTEMS_GEN_ALL_CORE
REVIEW OF SYSTEMS  Constitutional: No fever, No Chills, No fatigue  HEENT: No eye pain, No visual disturbances, No difficulty hearing  Pulm: No cough,  No shortness of breath  Cardio: No chest pain, No palpitations  GI:  No abdominal pain, No nausea, No vomiting, (+) diarrhea, No constipation  : No dysuria, No frequency, No hematuria  Neuro: No headaches, (+) memory loss, (+) loss of strength, (+) numbness, No tremors  Skin: No itching, No rashes, No lesions   Endo: No temperature intolerance  MSK: No joint pain, No joint swelling, No muscle pain, No Neck or back pain  Psych:  No depression, No anxiety

## 2025-04-18 NOTE — H&P ADULT - NSHPADDITIONALINFOADULT_GEN_ALL_CORE
Saw and evaluated the patient, reviewed medical records, took history, examined, started an assessment and management plan. Spent 70 minutes excluding teaching time

## 2025-04-18 NOTE — PROGRESS NOTE ADULT - SUBJECTIVE AND OBJECTIVE BOX
Patient seen and examined . Sitting on the chair , comfortable , denies n/v, voiding , last BM on 4/17,   TOLERATING DIET     CC : no complaints       MEDICATIONS  (STANDING):  acetaminophen   IVPB .. 675 milliGRAM(s) IV Intermittent once  chlorhexidine 2% Cloths 1 Application(s) Topical daily  enoxaparin Injectable 30 milliGRAM(s) SubCutaneous every 24 hours  hydrochlorothiazide 25 milliGRAM(s) Oral daily  influenza  Vaccine (HIGH DOSE) 0.5 milliLiter(s) IntraMuscular once  lisinopril 20 milliGRAM(s) Oral two times a day  pantoprazole  Injectable 40 milliGRAM(s) IV Push daily  polyethylene glycol 3350 17 Gram(s) Oral daily  tamsulosin 0.4 milliGRAM(s) Oral at bedtime    MEDICATIONS  (PRN):  acetaminophen     Tablet .. 650 milliGRAM(s) Oral every 6 hours PRN Temp greater or equal to 38C (100.4F), Mild Pain (1 - 3)  bisacodyl Suppository 10 milliGRAM(s) Rectal daily PRN Constipation  hydrALAZINE 25 milliGRAM(s) Oral every 6 hours PRN Systolic blood pressure > 160      LABS:                          12.4   7.89  )-----------( 260      ( 17 Apr 2025 06:00 )             37.0     04-17    139  |  101  |  8.0  ----------------------------<  88  3.6   |  25.0  |  0.52    Ca    8.8      17 Apr 2025 06:00  Phos  3.4     04-17  Mg     1.8     04-17      RADIOLOGY & ADDITIONAL TESTS:          REVIEW OF SYSTEMS:    As above , all other systems are reviewed and are negative .     I&O's Summary    17 Apr 2025 07:01  -  18 Apr 2025 07:00  --------------------------------------------------------  IN: 100 mL / OUT: 0 mL / NET: 100 mL          Vital Signs Last 24 Hrs  T(C): 36.5 (18 Apr 2025 08:26), Max: 36.9 (17 Apr 2025 21:39)  T(F): 97.7 (18 Apr 2025 08:26), Max: 98.4 (17 Apr 2025 21:39)  HR: 74 (18 Apr 2025 08:26) (74 - 94)  BP: 171/88 (18 Apr 2025 08:26) (124/78 - 171/88)  BP(mean): --  RR: 18 (18 Apr 2025 08:26) (17 - 18)  SpO2: 98% (18 Apr 2025 08:26) (94% - 98%)    Parameters below as of 18 Apr 2025 08:26  Patient On (Oxygen Delivery Method): room air      PHYSICAL EXAM:    GENERAL: NAD,  HEAD:  Atraumatic, Normocephalic  EYES: EOMI, PERRLA, conjunctiva and sclera clear  NECK: Supple, No JVD, Normal thyroid  NERVOUS SYSTEM:  Alert & Oriented X4, no focal deficit  CHEST/LUNG: CTA b/l ,  no  rales, rhonchi, wheezing, or rubs  HEART: Regular rate and rhythm; No murmurs, rubs, or gallops  ABDOMEN: Soft, Nontender, Nondistended; Bowel sounds present  EXTREMITIES:  2+ Peripheral Pulses, No clubbing, cyanosis, or edema  LYMPH: No lymphadenopathy noted  SKIN: No rashes or lesions

## 2025-04-18 NOTE — H&P ADULT - NSHPPHYSICALEXAM_GEN_ALL_CORE
Gen - NAD, Comfortable  HEENT - NCAT, EOMI, MMM  Neck - Supple, No limited ROM  Pulm - CTAB, No wheeze, No rhonchi, No crackles  Cardiovascular - RRR, S1S2, No murmurs  Abdomen - Soft, NT/ND, +BS  Extremities - No C/C/E, No calf tenderness  Neuro-     Cognitive - AAOx3, able to spell WORLD backwards, days of week backwords, missed one of serial sevens     Communication - (+) dysarthria, comprehension intact with 2 step commands, repetition intact     Attention: Intact      Judgement: Good evidence of judgement     Memory: Recall 3 objects immediate and 3 min later     Cranial Nerves - right sided facial droop, diplopia present but visual fields intact      Motor -                     LEFT    UE - ShAB 4+/5, EF 5/5, EE 5/5, WE 5/5,  5/5                    RIGHT UE - ShAB 4/5, EF 4+/5, EE 4+/5, WE 4/5,  3/5                    LEFT    LE - HF 5/5, KE 5/5, DF 5/5, PF 5/5                    RIGHT LE - HF 3/5, KE 4+/5, DF 4+/5, PF 5/5        Sensory - Decreased to light touch of bilateral forehead, right cheek, RUE and RLE     Reflexes - DTR Intact, No primitive reflexive     Coordination - FTN intact     Tone - normal  Psychiatric - Mood stable, Affect WNL  Skin:  1x1 stage 2 pressure wound of the sacrum Gen - NAD, Comfortable  HEENT - NCAT, EOMI, PERRLA  Neck - Supple, No limited ROM  Pulm - CTAB, No wheeze, No rhonchi, No crackles  Cardiovascular - RRR, S1S2, No murmurs  Abdomen - Soft, NT, ND, +BS  Extremities - No C/C/E, No calf tenderness  Neuro-     Cognitive - AAOx3, able to spell WORLD backwards, days of week backwords, missed one of serial sevens     Communication - (+) dysarthria, comprehension intact with 2 step commands, repetition intact     Attention: Intact      Judgement: Good evidence of judgement     Memory: Recall 3 objects immediate and 3 min later     Cranial Nerves - right sided facial droop, diplopia present but visual fields intact, left eye medially directed but EOM are intact      Motor -                     LEFT    UE - ShAB 4+/5, EF 5/5, EE 5/5, WE 5/5,  5/5                    RIGHT UE - ShAB 4/5, EF 4+/5, EE 4+/5, WE 4/5,  3/5                    LEFT    LE - HF 5/5, KE 5/5, DF 5/5, PF 5/5                    RIGHT LE - HF 3/5, KE 4+/5, DF 4+/5, PF 5/5        Sensory - Decreased to light touch of bilateral forehead, right cheek, RUE and RLE     Reflexes - DTR Intact, No primitive reflexive     Coordination - FTN intact     Tone - normal  Psychiatric - Mood stable, Affect WNL  Skin:  1x1 stage 2 pressure wound of the sacrum Gen - NAD, Comfortable  HEENT - NCAT, EOMI, PERRLA  Neck - Supple, No limited ROM  Pulm - CTAB, No wheeze, No rhonchi, No crackles  Cardiovascular - RRR, S1S2, No murmurs  Abdomen - Soft, NT, ND, +BS  Extremities - No C/C/E, No calf tenderness  Neuro-     Cognitive - AAOx3, able to spell WORLD backwards, days of week backwords, missed one of serial sevens     Communication - (+) dysarthria, comprehension intact with 2 step commands, repetition intact     Attention: Intact      Judgement: Good evidence of judgement     Memory: Recall 3 objects immediate and 3 min later     Cranial Nerves - right sided facial droop, diplopia present but visual fields intact, left eye medially directed but EOM are intact      Motor -                     LEFT    UE - ShAB 4+/5, EF 5/5, EE 5/5, WE 5/5,  5/5                    RIGHT UE - ShAB 4/5, EF 4+/5, EE 4+/5, WE 4/5,  3/5                    LEFT    LE - HF 5/5, KE 5/5, DF 5/5, PF 5/5                    RIGHT LE - HF 3/5, KE 4+/5, DF 4+/5, PF 5/5        Sensory - Decreased to light touch of bilateral forehead, right cheek, RUE and RLE     Reflexes - DTR Intact, No primitive reflexive     Coordination - FTN intact     Tone - normal  Psychiatric - Mood stable, Affect WNL  Skin:  1x1 stage 2 pressure wound of the Rt bottuck

## 2025-04-18 NOTE — PROGRESS NOTE ADULT - REASON FOR ADMISSION
Pontine hemorrhage
hemorrhage vs cavernoma

## 2025-04-19 PROBLEM — I10 ESSENTIAL (PRIMARY) HYPERTENSION: Chronic | Status: ACTIVE | Noted: 2025-04-09

## 2025-04-19 LAB
ALBUMIN SERPL ELPH-MCNC: 2.7 G/DL — LOW (ref 3.3–5)
ALP SERPL-CCNC: 89 U/L — SIGNIFICANT CHANGE UP (ref 40–120)
ALT FLD-CCNC: 95 U/L — HIGH (ref 10–45)
ANION GAP SERPL CALC-SCNC: 8 MMOL/L — SIGNIFICANT CHANGE UP (ref 5–17)
AST SERPL-CCNC: 76 U/L — HIGH (ref 10–40)
BASOPHILS # BLD AUTO: 0.08 K/UL — SIGNIFICANT CHANGE UP (ref 0–0.2)
BASOPHILS NFR BLD AUTO: 0.9 % — SIGNIFICANT CHANGE UP (ref 0–2)
BILIRUB SERPL-MCNC: 0.2 MG/DL — SIGNIFICANT CHANGE UP (ref 0.2–1.2)
BUN SERPL-MCNC: 20 MG/DL — SIGNIFICANT CHANGE UP (ref 7–23)
CALCIUM SERPL-MCNC: 8.8 MG/DL — SIGNIFICANT CHANGE UP (ref 8.4–10.5)
CHLORIDE SERPL-SCNC: 101 MMOL/L — SIGNIFICANT CHANGE UP (ref 96–108)
CO2 SERPL-SCNC: 28 MMOL/L — SIGNIFICANT CHANGE UP (ref 22–31)
CREAT SERPL-MCNC: 0.57 MG/DL — SIGNIFICANT CHANGE UP (ref 0.5–1.3)
EGFR: 94 ML/MIN/1.73M2 — SIGNIFICANT CHANGE UP
EGFR: 94 ML/MIN/1.73M2 — SIGNIFICANT CHANGE UP
EOSINOPHIL # BLD AUTO: 0.27 K/UL — SIGNIFICANT CHANGE UP (ref 0–0.5)
EOSINOPHIL NFR BLD AUTO: 2.9 % — SIGNIFICANT CHANGE UP (ref 0–6)
GLUCOSE SERPL-MCNC: 95 MG/DL — SIGNIFICANT CHANGE UP (ref 70–99)
HCT VFR BLD CALC: 35.6 % — SIGNIFICANT CHANGE UP (ref 34.5–45)
HGB BLD-MCNC: 11.9 G/DL — SIGNIFICANT CHANGE UP (ref 11.5–15.5)
IMM GRANULOCYTES NFR BLD AUTO: 0.2 % — SIGNIFICANT CHANGE UP (ref 0–0.9)
LYMPHOCYTES # BLD AUTO: 1.83 K/UL — SIGNIFICANT CHANGE UP (ref 1–3.3)
LYMPHOCYTES # BLD AUTO: 19.7 % — SIGNIFICANT CHANGE UP (ref 13–44)
MCHC RBC-ENTMCNC: 30.3 PG — SIGNIFICANT CHANGE UP (ref 27–34)
MCHC RBC-ENTMCNC: 33.4 G/DL — SIGNIFICANT CHANGE UP (ref 32–36)
MCV RBC AUTO: 90.6 FL — SIGNIFICANT CHANGE UP (ref 80–100)
MONOCYTES # BLD AUTO: 1.24 K/UL — HIGH (ref 0–0.9)
MONOCYTES NFR BLD AUTO: 13.3 % — SIGNIFICANT CHANGE UP (ref 2–14)
NEUTROPHILS # BLD AUTO: 5.85 K/UL — SIGNIFICANT CHANGE UP (ref 1.8–7.4)
NEUTROPHILS NFR BLD AUTO: 63 % — SIGNIFICANT CHANGE UP (ref 43–77)
NRBC BLD AUTO-RTO: 0 /100 WBCS — SIGNIFICANT CHANGE UP (ref 0–0)
PLATELET # BLD AUTO: 277 K/UL — SIGNIFICANT CHANGE UP (ref 150–400)
POTASSIUM SERPL-MCNC: 3.5 MMOL/L — SIGNIFICANT CHANGE UP (ref 3.5–5.3)
POTASSIUM SERPL-SCNC: 3.5 MMOL/L — SIGNIFICANT CHANGE UP (ref 3.5–5.3)
PROT SERPL-MCNC: 6.2 G/DL — SIGNIFICANT CHANGE UP (ref 6–8.3)
RBC # BLD: 3.93 M/UL — SIGNIFICANT CHANGE UP (ref 3.8–5.2)
RBC # FLD: 14.6 % — HIGH (ref 10.3–14.5)
SODIUM SERPL-SCNC: 137 MMOL/L — SIGNIFICANT CHANGE UP (ref 135–145)
WBC # BLD: 9.29 K/UL — SIGNIFICANT CHANGE UP (ref 3.8–10.5)
WBC # FLD AUTO: 9.29 K/UL — SIGNIFICANT CHANGE UP (ref 3.8–10.5)

## 2025-04-19 PROCEDURE — 99223 1ST HOSP IP/OBS HIGH 75: CPT

## 2025-04-19 PROCEDURE — 99222 1ST HOSP IP/OBS MODERATE 55: CPT | Mod: GC

## 2025-04-19 RX ADMIN — TAMSULOSIN HYDROCHLORIDE 0.4 MILLIGRAM(S): 0.4 CAPSULE ORAL at 20:55

## 2025-04-19 RX ADMIN — TOUCHLESS CARE ZINC OXIDE PROTECTANT 1 APPLICATION(S): 20; 25 SPRAY TOPICAL at 12:23

## 2025-04-19 RX ADMIN — Medication 500000 UNIT(S): at 00:47

## 2025-04-19 RX ADMIN — Medication 500000 UNIT(S): at 12:23

## 2025-04-19 RX ADMIN — LISINOPRIL 20 MILLIGRAM(S): 5 TABLET ORAL at 08:52

## 2025-04-19 RX ADMIN — LISINOPRIL 20 MILLIGRAM(S): 5 TABLET ORAL at 20:29

## 2025-04-19 RX ADMIN — ENOXAPARIN SODIUM 30 MILLIGRAM(S): 100 INJECTION SUBCUTANEOUS at 05:42

## 2025-04-19 RX ADMIN — POLYETHYLENE GLYCOL 3350 17 GRAM(S): 17 POWDER, FOR SOLUTION ORAL at 12:23

## 2025-04-19 RX ADMIN — Medication 500000 UNIT(S): at 05:43

## 2025-04-19 RX ADMIN — Medication 500000 UNIT(S): at 20:29

## 2025-04-19 NOTE — DIETITIAN INITIAL EVALUATION ADULT - EDUCATION DIETARY MODIFICATIONS
Education Provided on Need for Supplementation, Proper Nutrition & Low Sodium Diet/(2) meets goals/outcomes/verbalization

## 2025-04-19 NOTE — DIETITIAN INITIAL EVALUATION ADULT - NS FNS DIET ORDER
DASH-TLC Diet w/ Thin Liquids (IDDSI Level 0)  Recommend Change Diet to Low Sodium Diet   Recommend Initiate Ensure Plus High Protein 8oz PO Daily (Provides 350kcal & 20grams of Protein)   Education Provided on Need for Supplementation, Proper Nutrition & Low Sodium Diet

## 2025-04-19 NOTE — DIETITIAN INITIAL EVALUATION ADULT - LOSS OF SUBCUTANEOUS FAT
Received referral from trigger  for age  referral reason(s). 66 year old admitted 12/13/2022 as Inpatient with a diagnosis of lumbar femoral stenosis.  Prior to admission patient was living with Spouse/significant other and  residing at [facility].  Patient  does not  have a Power of  for Healthcare.  Patient’s Primary Care Provider is Anthony Torre MD.  Pt seen by therapy and anticipated to DC to home when cleared.  SW/CM to follow.      Ratna Gold, MSW, APSW, ACM     Orbital.../Triceps.../Buccal...

## 2025-04-19 NOTE — DIETITIAN INITIAL EVALUATION ADULT - NUTRITON FOCUSED PHYSICAL EXAM
Care Management Initial Consult    General Information  Assessment completed with: Patient, VM-chart review, lFetcher  Type of CM/SW Visit: Initial Assessment    Primary Care Provider verified and updated as needed: Yes   Readmission within the last 30 days: current reason for admission unrelated to previous admission   Return Category: New Diagnosis  Reason for Consult: discharge planning  Advance Care Planning:            Communication Assessment  Patient's communication style: spoken language (English or Bilingual)             Cognitive  Cognitive/Neuro/Behavioral: WDL                      Living Environment:   People in home: spouse  Angle  Current living Arrangements: house      Able to return to prior arrangements: yes       Family/Social Support:  Care provided by: self, spouse/significant other  Provides care for: spouse  Marital Status:   Wife  Angle       Description of Support System: Supportive         Current Resources:   Patient receiving home care services:       Community Resources:    Equipment currently used at home: grab bar, tub/shower  Supplies currently used at home:      Employment/Financial:  Employment Status: retired        Financial Concerns:             Lifestyle & Psychosocial Needs:  Social Determinants of Health     Tobacco Use: Medium Risk     Smoking Tobacco Use: Former Smoker     Smokeless Tobacco Use: Never Used   Alcohol Use: Not on file   Financial Resource Strain: Not on file   Food Insecurity: Not on file   Transportation Needs: Not on file   Physical Activity: Not on file   Stress: Not on file   Social Connections: Not on file   Intimate Partner Violence: Not on file   Depression: Not on file   Housing Stability: Not on file       Functional Status:  Prior to admission patient needed assistance:              Mental Health Status:          Chemical Dependency Status:                Values/Beliefs:  Spiritual, Cultural Beliefs, Hinduism Practices, Values that affect  care:                 Additional Information:  Consult for discharge planning. Patient admitted on 8/26/22 for fall with closed head injury and closed fracture of one rib and a tentative discharge date of 8/28/22.  Writer reviewed chart and TCU recommendations at discharge. Writer met with patient at bedside and introduced self and role. Patient in agreement for TCU at discharge but would prefer to discharge home. He understands that at this time he is requiring 1-2 for mobility. He stated that his wife is home 24/7 and that his daughter is recently in town but not at their home at this time. If patient continues to require TCU at discharge he would like referrals sent to [facility] and [facility] (in that order). Referrals sent via [facility].     Patient has been vaccinated for COVID and has had the booster. Writer encouraged patient and/or family to reach out to facility directly for most up to date visitor guidelines.     JUSTINO Harris         yes...

## 2025-04-19 NOTE — CONSULT NOTE ADULT - SUBJECTIVE AND OBJECTIVE BOX
Patient is a 75y old  Female who presents with a chief complaint of Acute pontine intracranial hemorrhage (18 Apr 2025 14:15)        HPI:  Michell Neves is a 74 year-old female PMHx of HTN and Raynaud's phenomenon who presented to [facility] ED on 4/9 for dizziness, nausea, and vomiting followed by facial droop and right sided weakness w/ diplopia. Patient found to have SBP over 200mmHg and started on cardene gtt. CTH w/o contrast demonstrated acute pontine hemorrhage and was transferred to [facility] for neurosurgical evaluation. No surgical intervention required. Patient admitted to NSICU and was monitored and treated with antihypertensives. Patient with initial dysphagia but MBS performed and cleared for regular diet w/ thin fluids. Course complicated by urinary retention. TOV initiated on 4/16 and patient started on flomax for a course of at least 3 weeks. Chest pain earlier in admission but workup negative for cardiac original likely related to hypertensive emergency. Leukocytosis now resolved. CXR performed with small bilateral pleural effusions and atelectasis not treated with antibiotics. Patient seen by PT/OT and physiatry recommending acute inpatient rehabilitation. Patient discharged to [facility] on 4/18. (18 Apr 2025 14:15)      PAST MEDICAL & SURGICAL HISTORY:  HTN (hypertension)      H/O Raynaud's syndrome      S/P lumpectomy, left breast            Substance Use (street drugs): ( X ) never used  (  ) other:  Tobacco Usage:  ( X  ) never smoked   (   ) former smoker   (   ) current smoker  (     ) pack year  Alcohol Usage: denies      Allergies    No Known Allergies    Intolerances        nystatin    Suspension 568462 Unit(s) Oral four times a day  zinc oxide 40% Paste 1 Application(s) Topical daily      REVIEW OF SYSTEMS:  Constitutional: No fever, No Chills, No fatigue  HEENT: No eye pain, No visual disturbances, No difficulty hearing  Pulm: No cough,  No shortness of breath  Cardio: No chest pain, No palpitations  GI:  No abdominal pain, No nausea, No vomiting, (+) diarrhea, No constipation  : No dysuria, No frequency, No hematuria  Neuro: No headaches, (+) memory loss, (+) loss of strength, (+) numbness, No tremors  Skin: No itching, No rashes, No lesions   Endo: No temperature intolerance  MSK: No joint pain, No joint swelling, No muscle pain, No Neck or back pain  Psych:  No depression, No anxiety    T(C): 36.8 (04-19-25 @ 08:58), Max: 36.8 (04-18-25 @ 21:03)  HR: 77 (04-19-25 @ 08:58) (77 - 90)  BP: 150/82 (04-19-25 @ 08:58) (116/70 - 150/82)  RR: 17 (04-19-25 @ 08:58) (16 - 18)  SpO2: 95% (04-19-25 @ 08:58) (95% - 97%)  Wt(kg): --Vital Signs Last 24 Hrs  T(C): 36.8 (19 Apr 2025 08:58), Max: 36.8 (18 Apr 2025 21:03)  T(F): 98.2 (19 Apr 2025 08:58), Max: 98.3 (18 Apr 2025 21:03)  HR: 77 (19 Apr 2025 08:58) (77 - 90)  BP: 150/82 (19 Apr 2025 08:58) (116/70 - 150/82)  BP(mean): --  RR: 17 (19 Apr 2025 08:58) (16 - 18)  SpO2: 95% (19 Apr 2025 08:58) (95% - 97%)    Parameters below as of 19 Apr 2025 08:58  Patient On (Oxygen Delivery Method): room air        PHYSICAL EXAM:  GENERAL: NAD, well-groomed, well-developed  HEAD:  Atraumatic, Normocephalic  EYES: EOMI, conjunctiva and sclera clear. eye patch in place  ENMT: Moist mucous membranes  NECK: Supple, No JVD  NERVOUS SYSTEM:  Alert & Oriented X3, Good concentration  CHEST/LUNG: Clear to percussion bilaterally; No rales, rhonchi, wheezing  HEART: Regular rate and rhythm  ABDOMEN: Soft, Nontender, Nondistended; Bowel sounds present  EXTREMITIES:  No clubbing, cyanosis, or edema      LABS:                        11.9   9.29  )-----------( 277      ( 19 Apr 2025 06:55 )             35.6     04-19    137  |  101  |  20  ----------------------------<  95  3.5   |  28  |  0.57    Ca    8.8      19 Apr 2025 06:55    TPro  6.2  /  Alb  2.7[L]  /  TBili  0.2  /  DBili  x   /  AST  76[H]  /  ALT  95[H]  /  AlkPhos  89  04-19      Urinalysis Basic - ( 19 Apr 2025 06:55 )    Color: x / Appearance: x / SG: x / pH: x  Gluc: 95 mg/dL / Ketone: x  / Bili: x / Urobili: x   Blood: x / Protein: x / Nitrite: x   Leuk Esterase: x / RBC: x / WBC x   Sq Epi: x / Non Sq Epi: x / Bacteria: x       CAPILLARY BLOOD GLUCOSE            Urinalysis Basic - ( 19 Apr 2025 06:55 )    Color: x / Appearance: x / SG: x / pH: x  Gluc: 95 mg/dL / Ketone: x  / Bili: x / Urobili: x   Blood: x / Protein: x / Nitrite: x   Leuk Esterase: x / RBC: x / WBC x   Sq Epi: x / Non Sq Epi: x / Bacteria: x        RADIOLOGY & ADDITIONAL TESTS:    Consultant(s) Notes Reviewed:  [x ] YES  [ ] NO  Care Discussed with Consultants/Other Providers [ x] YES  [ ] NO  Imaging Personally Reviewed:  [ ] YES  [ ] NO

## 2025-04-19 NOTE — DIETITIAN INITIAL EVALUATION ADULT - PERTINENT MEDS FT
MEDICATIONS  (STANDING):  enoxaparin Injectable 30 milliGRAM(s) SubCutaneous every 24 hours  hydrochlorothiazide 25 milliGRAM(s) Oral daily  lisinopril 20 milliGRAM(s) Oral two times a day  nystatin    Suspension 158318 Unit(s) Oral four times a day  polyethylene glycol 3350 17 Gram(s) Oral daily  senna 2 Tablet(s) Oral at bedtime  tamsulosin 0.4 milliGRAM(s) Oral at bedtime  zinc oxide 40% Paste 1 Application(s) Topical daily    MEDICATIONS  (PRN):  acetaminophen     Tablet .. 650 milliGRAM(s) Oral every 6 hours PRN Mild Pain (1 - 3)  bisacodyl 5 milliGRAM(s) Oral every 12 hours PRN Constipation  melatonin 3 milliGRAM(s) Oral at bedtime PRN Insomnia

## 2025-04-19 NOTE — DIETITIAN INITIAL EVALUATION ADULT - PERTINENT LABORATORY DATA
04-19    137  |  101  |  20  ----------------------------<  95  3.5   |  28  |  0.57    Ca    8.8      19 Apr 2025 06:55    TPro  6.2  /  Alb  2.7[L]  /  TBili  0.2  /  DBili  x   /  AST  76[H]  /  ALT  95[H]  /  AlkPhos  89  04-19  A1C with Estimated Average Glucose Result: 5.4 % (04-09-25 @ 22:30)

## 2025-04-19 NOTE — DIETITIAN INITIAL EVALUATION ADULT - OTHER INFO
Initial Nutrition Assessment   69yr Old Female   Denies Food Allergy/Intolerance  Tolerates Diet Well - No Chewing/Swallowing Complications (Per Patient)  Consumed 50-75% of 1st Meals (as Per Documentation)  Stage 2 Pressure Ulcer on Right Buttock (as Per Nursing Flow Sheets)  No Edema Noted (as Per Nursing Flow Sheets)  No Recent Vomiting/Diarrhea & Some Recent Nausea/Constipation (as Per Patient)

## 2025-04-19 NOTE — DIETITIAN NUTRITION RISK NOTIFICATION - TREATMENT: THE FOLLOWING DIET HAS BEEN RECOMMENDED
Recommend Change Diet to Low Sodium Diet   Recommend Initiate Ensure Plus High Protein 8oz PO Daily  Nutrition Education Provided        [Postnasal Drip] : postnasal drip [Cough] : cough [Skin Rash] : skin rash [Negative] : Heme/Lymph [Fever] : no fever [Chills] : no chills [Night Sweats] : no night sweats [Earache] : no earache [Hoarseness] : no hoarseness [Nasal Discharge] : no nasal discharge [Sore Throat] : no sore throat [Chest Pain] : no chest pain [Palpitations] : no palpitations [Leg Claudication] : no leg claudication [Lower Ext Edema] : no lower extremity edema [Shortness Of Breath] : no shortness of breath [Wheezing] : no wheezing [Dysuria] : no dysuria [Hematuria] : no hematuria [Muscle Pain] : no muscle pain [Itching] : no itching [Nail Changes] : no nail changes [Headache] : no headache [Dizziness] : no dizziness [Fainting] : no fainting [FreeTextEntry9] : neg for calf tenderness or swelling

## 2025-04-19 NOTE — CONSULT NOTE ADULT - ASSESSMENT
75y with HTN and Raynaud's phenomenon admitted to [facility] for 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  Hospital Course complicated by urinary retention, leukocytosis, and chest pain. Patient now admitted for a multidisciplinary rehab program.     Acute left dorsal pontine hemorrhage   -imaging demonstrated acute left pontine hemorrhage in setting of hypertensive emergency likely due to long term uncontrolled hypertension  -initially managed with cardene gtt and adjusted to antihypertensive outlined below  -goal SBP under 160mmHg  -No PFO present on TTE  -evaluated by neurosurgical team and temporarily managed in NSICU - no surgical intervention required   -approved for DVT chemoprophylaxis by neurosurgery   -requires repeat MRI of the head in 6 weeks and outpatient neurosurgery follow up   -comprehensive rehab    Transaminitis:  - AST/ALT level (04/19) 76/95  - denies abdominal pain  - Limit Tylenol use  - Monitor    HTN  - goal BP under 160mmHg  -TTE performed: negative for PFO w/ EF 70-75% and mild aortic stenosis -> recommend cardiac follow up outpatient   - lisinopril 20mg daily  - HCTZ 25mg daily  - monitor for orthostasis    GI / Bowel  - Senna qHS  - Miralax Daily  - bisacodyl prn q12h 5mg po     / Bladder  Urinary Retention  - Currently patient voids: independently  - Flomax 0.4mg qhs     DVT prophylaxis:   - Lovenox 30mg subq daily  - Last Doppler BLE on 4/10 negative for DVTs

## 2025-04-19 NOTE — DIETITIAN INITIAL EVALUATION ADULT - ADD RECOMMEND
1) Monitor Weights, Intake, Tolerance, Skin & Labwork  2) Recommend Initiate Ensure Plus High Protein 8oz PO BID  3) Education Provided on Need for Supplementation, Proper Nutrition & Low Sodium Diet   4) Continue Nutrition Plan of Care

## 2025-04-19 NOTE — DIETITIAN INITIAL EVALUATION ADULT - ORAL INTAKE PTA/DIET HISTORY
Patient Does Not Follow Diet @Home But Tries to Limit Salt  Consumes 3 Meals a Day   Usually Cooks For Self  Does Take Vitamin/Supplements @Home (Vitamin B, Vitamin D)

## 2025-04-20 PROCEDURE — 99232 SBSQ HOSP IP/OBS MODERATE 35: CPT | Mod: GC

## 2025-04-20 PROCEDURE — 99232 SBSQ HOSP IP/OBS MODERATE 35: CPT

## 2025-04-20 RX ORDER — POLYETHYLENE GLYCOL 3350 17 G/17G
17 POWDER, FOR SOLUTION ORAL DAILY
Refills: 0 | Status: DISCONTINUED | OUTPATIENT
Start: 2025-04-20 | End: 2025-04-20

## 2025-04-20 RX ADMIN — LISINOPRIL 20 MILLIGRAM(S): 5 TABLET ORAL at 17:32

## 2025-04-20 RX ADMIN — TAMSULOSIN HYDROCHLORIDE 0.4 MILLIGRAM(S): 0.4 CAPSULE ORAL at 20:13

## 2025-04-20 RX ADMIN — TOUCHLESS CARE ZINC OXIDE PROTECTANT 1 APPLICATION(S): 20; 25 SPRAY TOPICAL at 11:33

## 2025-04-20 RX ADMIN — ENOXAPARIN SODIUM 30 MILLIGRAM(S): 100 INJECTION SUBCUTANEOUS at 05:47

## 2025-04-20 RX ADMIN — Medication 500000 UNIT(S): at 05:47

## 2025-04-20 RX ADMIN — LISINOPRIL 20 MILLIGRAM(S): 5 TABLET ORAL at 05:48

## 2025-04-20 RX ADMIN — Medication 500000 UNIT(S): at 11:32

## 2025-04-20 NOTE — PROGRESS NOTE ADULT - ASSESSMENT
75y with HTN and Raynaud's phenomenon admitted to [facility] for 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  Hospital Course complicated by urinary retention, leukocytosis, and chest pain. Patient now admitted for a multidisciplinary rehab program.     Acute left dorsal pontine hemorrhage   -imaging demonstrated acute left pontine hemorrhage in setting of hypertensive emergency likely due to long term uncontrolled hypertension  -initially managed with cardene gtt and adjusted to antihypertensive outlined below  -goal SBP under 160mmHg  -No PFO present on TTE  -evaluated by neurosurgical team and temporarily managed in NSICU - no surgical intervention required   -approved for DVT chemoprophylaxis by neurosurgery   -requires repeat MRI of the head in 6 weeks and outpatient neurosurgery follow up   -comprehensive rehab    Transaminitis:  - AST/ALT level (04/19) 76/95  - denies abdominal pain  - Limit Tylenol use  - Monitor    HTN  - goal BP under 160mmHg  -TTE performed: negative for PFO w/ EF 70-75% and mild aortic stenosis -> recommend cardiac follow up outpatient   - lisinopril 20mg daily  - HCTZ 25mg daily  - monitor for orthostasis    GI / Bowel  - Senna qHS  - Miralax Daily  - bisacodyl prn q12h 5mg po     / Bladder  Urinary Retention  - Currently patient voids: independently  - Flomax 0.4mg qhs     DVT prophylaxis:   - Lovenox 30mg subq daily  - Last Doppler BLE on 4/10 negative for DVTs

## 2025-04-20 NOTE — PROGRESS NOTE ADULT - SUBJECTIVE AND OBJECTIVE BOX
PROGRESS NOTE:     Patient is a 75y old  Female who presents with a chief complaint of Acute pontine intracranial hemorrhage (19 Apr 2025 10:54)          SUBJECTIVE & OBJECTIVE:   Pt seen and examined at bedside in AM    no overnight events.     REVIEW OF SYSTEMS: remaining ROS negative     PHYSICAL EXAM:  VITALS:  Vital Signs Last 24 Hrs  T(C): 36.6 (20 Apr 2025 08:46), Max: 37 (19 Apr 2025 20:28)  T(F): 97.8 (20 Apr 2025 08:46), Max: 98.6 (19 Apr 2025 20:28)  HR: 73 (20 Apr 2025 08:46) (73 - 90)  BP: 139/79 (20 Apr 2025 08:46) (115/75 - 158/82)  BP(mean): --  RR: 16 (20 Apr 2025 08:46) (15 - 16)  SpO2: 97% (20 Apr 2025 08:46) (97% - 98%)    Parameters below as of 20 Apr 2025 08:46  Patient On (Oxygen Delivery Method): room air          GENERAL: NAD,  no increased WOB  HEAD:  Atraumatic, Normocephalic  EYES: EOMI, conjunctiva and sclera clear  ENMT: Moist mucous membranes  NECK: Supple, No JVD  NERVOUS SYSTEM:  Alert & Oriented   CHEST/LUNG: Clear to auscultation bilaterally; No rales, rhonchi, wheezing  HEART: Regular rate and rhythm; No murmurs, rubs, or gallops  ABDOMEN: Soft, Nontender, Nondistended; Bowel sounds present  EXTREMITIES:  No clubbing, cyanosis, calf tenderness or edema b/l      MEDICATIONS  (STANDING):  enoxaparin Injectable 30 milliGRAM(s) SubCutaneous every 24 hours  hydrochlorothiazide 25 milliGRAM(s) Oral daily  lisinopril 20 milliGRAM(s) Oral two times a day  nystatin    Suspension 715296 Unit(s) Oral four times a day  polyethylene glycol 3350 17 Gram(s) Oral daily  senna 2 Tablet(s) Oral at bedtime  tamsulosin 0.4 milliGRAM(s) Oral at bedtime  zinc oxide 40% Paste 1 Application(s) Topical daily    MEDICATIONS  (PRN):  acetaminophen     Tablet .. 650 milliGRAM(s) Oral every 6 hours PRN Mild Pain (1 - 3)  bisacodyl 5 milliGRAM(s) Oral every 12 hours PRN Constipation  melatonin 3 milliGRAM(s) Oral at bedtime PRN Insomnia      Allergies    No Known Allergies    Intolerances              LABS:                           11.9   9.29  )-----------( 277      ( 19 Apr 2025 06:55 )             35.6     04-19    137  |  101  |  20  ----------------------------<  95  3.5   |  28  |  0.57    Ca    8.8      19 Apr 2025 06:55    TPro  6.2  /  Alb  2.7[L]  /  TBili  0.2  /  DBili  x   /  AST  76[H]  /  ALT  95[H]  /  AlkPhos  89  04-19      Urinalysis Basic - ( 19 Apr 2025 06:55 )    Color: x / Appearance: x / SG: x / pH: x  Gluc: 95 mg/dL / Ketone: x  / Bili: x / Urobili: x   Blood: x / Protein: x / Nitrite: x   Leuk Esterase: x / RBC: x / WBC x   Sq Epi: x / Non Sq Epi: x / Bacteria: x      CAPILLARY BLOOD GLUCOSE                    RECENT CULTURES:          RADIOLOGY & ADDITIONAL TESTS:

## 2025-04-20 NOTE — PROGRESS NOTE ADULT - SUBJECTIVE AND OBJECTIVE BOX
HPI:  Michell Neves is a 74 year-old female PMHx of HTN and Raynaud's phenomenon who presented to [facility] ED on 4/9 for dizziness, nausea, and vomiting followed by facial droop and right sided weakness w/ diplopia. Patient found to have SBP over 200mmHg and started on cardene gtt. CTH w/o contrast demonstrated acute pontine hemorrhage and was transferred to [facility] for neurosurgical evaluation. No surgical intervention required. Patient admitted to NSICU and was monitored and treated with antihypertensives. Patient with initial dysphagia but MBS performed and cleared for regular diet w/ thin fluids. Course complicated by urinary retention. TOV initiated on 4/16 and patient started on flomax for a course of at least 3 weeks. Chest pain earlier in admission but workup negative for cardiac original likely related to hypertensive emergency. Leukocytosis now resolved. CXR performed with small bilateral pleural effusions and atelectasis not treated with antibiotics. Patient seen by PT/OT and physiatry recommending acute inpatient rehabilitation. Patient discharged to [facility] on 4/18.     Allergies  No Known Allergies    Subjective:  - Patient was seen and examined at bedside, NAD  - Sleeping well at night, no events  - No pain  - (+) Diplopia and dizziness  - LBM (04/19) x 2 loose, voiding without issues   - Tolerating therapy, motivated   - Her goal is to get better with her walking and vision, go home  - Pressure injury education      ROS: - Denies CP, palpitation, SOB, cough, fever, chills, headache, abdominal pain, N/V/D/C, dysuria, hematuria, joint pain or swelling. (+) dizziness, visual changes    MEDICATIONS  (STANDING):  enoxaparin Injectable 30 milliGRAM(s) SubCutaneous every 24 hours  hydrochlorothiazide 25 milliGRAM(s) Oral daily  lisinopril 20 milliGRAM(s) Oral two times a day  nystatin    Suspension 250278 Unit(s) Oral four times a day  senna 2 Tablet(s) Oral at bedtime  tamsulosin 0.4 milliGRAM(s) Oral at bedtime  zinc oxide 40% Paste 1 Application(s) Topical daily    MEDICATIONS  (PRN):  acetaminophen     Tablet .. 650 milliGRAM(s) Oral every 6 hours PRN Mild Pain (1 - 3)  bisacodyl 5 milliGRAM(s) Oral every 12 hours PRN Constipation  melatonin 3 milliGRAM(s) Oral at bedtime PRN Insomnia  polyethylene glycol 3350 17 Gram(s) Oral daily PRN Constipation      Recent Labs:                        11.9   9.29  )-----------( 277      ( 19 Apr 2025 06:55 )             35.6     04-19    137  |  101  |  20  ----------------------------<  95  3.5   |  28  |  0.57    Ca    8.8      19 Apr 2025 06:55    TPro  6.2  /  Alb  2.7[L]  /  TBili  0.2  /  DBili  x   /  AST  76[H]  /  ALT  95[H]  /  AlkPhos  89  04-19    LIVER FUNCTIONS - ( 19 Apr 2025 06:55 )  Alb: 2.7 g/dL / Pro: 6.2 g/dL / ALK PHOS: 89 U/L / ALT: 95 U/L / AST: 76 U/L / GGT: x          Physical Exam:  Vital Signs Last 24 Hrs  T(C): 36.6 (20 Apr 2025 08:46), Max: 37 (19 Apr 2025 20:28)  T(F): 97.8 (20 Apr 2025 08:46), Max: 98.6 (19 Apr 2025 20:28)  HR: 73 (20 Apr 2025 08:46) (73 - 90)  BP: 139/79 (20 Apr 2025 08:46) (115/75 - 158/82)  RR: 16 (20 Apr 2025 08:46) (15 - 16)  SpO2: 97% (20 Apr 2025 08:46) (97% - 98%)  Parameters below as of 20 Apr 2025 08:46  Patient On (Oxygen Delivery Method): room air         Physical Exam: Gen - NAD, Comfortable  HEENT - NCAT, EOMI, PERRLA, Eye patch is on  Neck - Supple, No limited ROM  Pulm - CTAB, No crackles  Cardiovascular - RRR, S1S2   Abdomen - Soft, NT, ND   Extremities - No C/C/E, No calf tenderness  Neuro-     Cognitive - AAOx3, able to spell WORLD backwards, days of week backwords, missed one of serial sevens     Communication - Mild dysarthria, comprehension intact with 2 step commands, repetition intact     Attention: Intact      Judgement: Good evidence of judgement     Memory: Recall 3 objects immediate and 3 min later     Cranial Nerves - Mild right sided facial droop, diplopia present but visual fields intact, left eye medially directed but EOM are intact      Motor -                     LEFT    UE - ShAB 4+/5, EF 5/5, EE 5/5, WE 5/5,  5/5                    RIGHT UE - ShAB 4/5, EF 4+/5, EE 4+/5, WE 4/5,  3/5                    LEFT    LE - HF 5/5, KE 5/5, DF 5/5, PF 5/5                    RIGHT LE - HF 3/5, KE 4+/5, DF 4+/5, PF 5/5        Sensory - Decreased to light touch of bilateral forehead, right cheek, RUE and RLE  Psychiatric - Mood stable, Affect WNL  Skin:  1x1 stage 2 pressure wound of the sacrum

## 2025-04-20 NOTE — PROGRESS NOTE ADULT - ASSESSMENT
JANET BUSTILLOS is a 75y with HTN and Raynaud's phenomenon admitted to [facility] for 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  Hospital Course complicated by urinary retention, leukocytosis, and chest pain. Patient now admitted for a multidisciplinary rehab program.     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)    -imaging demonstrated acute left pontine hemorrhage in setting of hypertensive emergency likely due to long term uncontrolled hypertension  -initially managed with cardene gtt and adjusted to antihypertensive outlined below  -goal SBP under 160mmHg  -No PFO present on TTE  -evaluated by neurosurgical team and temporarily managed in NSICU - no surgical intervention required   - Keep bed head at 35 degrees all the times and 90 degrees with food out of bed  -dysphagia reevaluated with MBS and upgraded to regular diet with thin liquids  -approved for DVT chemoprophylaxis by neurosurgery   -Repeat MRI of the head in 6 weeks and outpatient neurosurgery follow up (05/21)   -deficits include right sided hemiparesis, facial droop, dysphagia, dysarthria  -Oral thrush present: nystatin mouthwash ordered 4 times a day  -Oral hygiene x 4 daily prior to Nystatin treatment  -Double vision--> Eye patch     Comprehensive Multidisciplinary Rehab Program:   -Comprehensive rehab program of PT/OT/SLP - 3 hours a day, 5 days a week.   -PT: strengthening, coordination, balance, endurance, gait,  -OT: strengthening, coordination, fine motor, ADLs  -SLP: dysphagia, dysarthria, memory impairment  _______________________________________________________________________________  CONCURRENT MEDICAL PROBLEMS    Transaminitis:  - AST/ALT level (04/19) 76/95  - Limit Tylenol use  - Monitor  - Hospitalist F/U      HTN  - goal BP under 160mmHg  -TTE performed: negative for PFO w/ EF 70-75% and mild aortic stenosis -> recommend cardiac follow up outpatient   - lisinopril 20mg daily  - HCTZ 25mg daily  - Monitor /75 - 158/82--> (04/20)     Pain  - Tylenol PRN  - Avoid sedating medications that may interfere with cognitive recovery    Sleep  - Maintain quiet hours and a low stim environment.   - Melatonin PRN     GI / Bowel  - Senna qHS  - Miralax Daily--> Changed to PRN (04/20)   - bisacodyl prn q12h 5mg po  - monitor for loose stools     / Bladder  Urinary Retention  - Currently patient voids: independently  - Bladder scans Q8 hours with straight cath for >400cc.  - Required holden placement previously and TOV started 4/16  - Flomax 0.4mg qhs     Skin / Pressure injury  - Turn q2 hours in bed while awake, air mattress  - nursing to monitor skin qShift  - soft heel protectors  - skin barrier cream for stage 2 pressure wound sacrum 1x1cm daily    Diet/Dysphagia:  - Diet Consistency: DASH diet, regular consistency and thin liquids   - Supplements: B12 1000mcg daily, Vit D 25mcg daily  - Aspiration Precautions  - SLP treatment on going   - Nutrition consult    DVT prophylaxis:   - Lovenox 30mg subq daily  - Last Doppler BLE on 4/10 negative for DVTs      Outpatient Follow-up:  Logan Farmerul  Neurosurgery  [address] [place] [id number]  Phone: [phone]  Fax: [phone]  Follow Up Time: 1

## 2025-04-21 LAB
ALBUMIN SERPL ELPH-MCNC: 3.1 G/DL — LOW (ref 3.3–5)
ALP SERPL-CCNC: 94 U/L — SIGNIFICANT CHANGE UP (ref 40–120)
ALT FLD-CCNC: 86 U/L — HIGH (ref 10–45)
ANION GAP SERPL CALC-SCNC: 8 MMOL/L — SIGNIFICANT CHANGE UP (ref 5–17)
AST SERPL-CCNC: 47 U/L — HIGH (ref 10–40)
BASOPHILS # BLD AUTO: 0.1 K/UL — SIGNIFICANT CHANGE UP (ref 0–0.2)
BASOPHILS NFR BLD AUTO: 1 % — SIGNIFICANT CHANGE UP (ref 0–2)
BILIRUB SERPL-MCNC: 0.3 MG/DL — SIGNIFICANT CHANGE UP (ref 0.2–1.2)
BUN SERPL-MCNC: 22 MG/DL — SIGNIFICANT CHANGE UP (ref 7–23)
CALCIUM SERPL-MCNC: 9.5 MG/DL — SIGNIFICANT CHANGE UP (ref 8.4–10.5)
CHLORIDE SERPL-SCNC: 99 MMOL/L — SIGNIFICANT CHANGE UP (ref 96–108)
CO2 SERPL-SCNC: 28 MMOL/L — SIGNIFICANT CHANGE UP (ref 22–31)
CREAT SERPL-MCNC: 0.65 MG/DL — SIGNIFICANT CHANGE UP (ref 0.5–1.3)
EGFR: 91 ML/MIN/1.73M2 — SIGNIFICANT CHANGE UP
EGFR: 91 ML/MIN/1.73M2 — SIGNIFICANT CHANGE UP
EOSINOPHIL # BLD AUTO: 0.23 K/UL — SIGNIFICANT CHANGE UP (ref 0–0.5)
EOSINOPHIL NFR BLD AUTO: 2.3 % — SIGNIFICANT CHANGE UP (ref 0–6)
GLUCOSE SERPL-MCNC: 93 MG/DL — SIGNIFICANT CHANGE UP (ref 70–99)
HCT VFR BLD CALC: 39.1 % — SIGNIFICANT CHANGE UP (ref 34.5–45)
HGB BLD-MCNC: 12.8 G/DL — SIGNIFICANT CHANGE UP (ref 11.5–15.5)
IMM GRANULOCYTES NFR BLD AUTO: 0.4 % — SIGNIFICANT CHANGE UP (ref 0–0.9)
LYMPHOCYTES # BLD AUTO: 1.68 K/UL — SIGNIFICANT CHANGE UP (ref 1–3.3)
LYMPHOCYTES # BLD AUTO: 17.1 % — SIGNIFICANT CHANGE UP (ref 13–44)
MCHC RBC-ENTMCNC: 30.1 PG — SIGNIFICANT CHANGE UP (ref 27–34)
MCHC RBC-ENTMCNC: 32.7 G/DL — SIGNIFICANT CHANGE UP (ref 32–36)
MCV RBC AUTO: 92 FL — SIGNIFICANT CHANGE UP (ref 80–100)
MONOCYTES # BLD AUTO: 1.28 K/UL — HIGH (ref 0–0.9)
MONOCYTES NFR BLD AUTO: 13 % — SIGNIFICANT CHANGE UP (ref 2–14)
NEUTROPHILS # BLD AUTO: 6.5 K/UL — SIGNIFICANT CHANGE UP (ref 1.8–7.4)
NEUTROPHILS NFR BLD AUTO: 66.2 % — SIGNIFICANT CHANGE UP (ref 43–77)
NRBC BLD AUTO-RTO: 0 /100 WBCS — SIGNIFICANT CHANGE UP (ref 0–0)
PLATELET # BLD AUTO: 323 K/UL — SIGNIFICANT CHANGE UP (ref 150–400)
POTASSIUM SERPL-MCNC: 4.3 MMOL/L — SIGNIFICANT CHANGE UP (ref 3.5–5.3)
POTASSIUM SERPL-SCNC: 4.3 MMOL/L — SIGNIFICANT CHANGE UP (ref 3.5–5.3)
PROT SERPL-MCNC: 7 G/DL — SIGNIFICANT CHANGE UP (ref 6–8.3)
RBC # BLD: 4.25 M/UL — SIGNIFICANT CHANGE UP (ref 3.8–5.2)
RBC # FLD: 14.6 % — HIGH (ref 10.3–14.5)
SODIUM SERPL-SCNC: 135 MMOL/L — SIGNIFICANT CHANGE UP (ref 135–145)
WBC # BLD: 9.83 K/UL — SIGNIFICANT CHANGE UP (ref 3.8–10.5)
WBC # FLD AUTO: 9.83 K/UL — SIGNIFICANT CHANGE UP (ref 3.8–10.5)

## 2025-04-21 PROCEDURE — 99232 SBSQ HOSP IP/OBS MODERATE 35: CPT | Mod: GC

## 2025-04-21 PROCEDURE — 99232 SBSQ HOSP IP/OBS MODERATE 35: CPT

## 2025-04-21 RX ADMIN — LISINOPRIL 20 MILLIGRAM(S): 5 TABLET ORAL at 17:57

## 2025-04-21 RX ADMIN — Medication 500000 UNIT(S): at 05:34

## 2025-04-21 RX ADMIN — TAMSULOSIN HYDROCHLORIDE 0.4 MILLIGRAM(S): 0.4 CAPSULE ORAL at 20:51

## 2025-04-21 RX ADMIN — Medication 500000 UNIT(S): at 12:53

## 2025-04-21 RX ADMIN — LISINOPRIL 20 MILLIGRAM(S): 5 TABLET ORAL at 05:35

## 2025-04-21 RX ADMIN — Medication 500000 UNIT(S): at 19:25

## 2025-04-21 RX ADMIN — ENOXAPARIN SODIUM 30 MILLIGRAM(S): 100 INJECTION SUBCUTANEOUS at 05:30

## 2025-04-21 NOTE — PROGRESS NOTE ADULT - ASSESSMENT
JANET BUSTILLOS is a 75y with HTN and Raynaud's phenomenon admitted to [facility] for 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  Hospital Course complicated by urinary retention, leukocytosis, and chest pain. Patient now admitted for a multidisciplinary rehab program.     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)    -imaging demonstrated acute left pontine hemorrhage in setting of hypertensive emergency likely due to long term uncontrolled hypertension  -initially managed with cardene gtt and adjusted to antihypertensive outlined below  -goal SBP under 160mmHg  -No PFO present on TTE  -evaluated by neurosurgical team and temporarily managed in NSICU - no surgical intervention required   - Keep bed head at 35 degrees all the times and 90 degrees with food out of bed  -dysphagia reevaluated with MBS and upgraded to regular diet with thin liquids  -approved for DVT chemoprophylaxis by neurosurgery   -Repeat MRI of the head in 6 weeks and outpatient neurosurgery follow up (05/21)   -deficits include right sided hemiparesis, facial droop, dysphagia, dysarthria  -Oral thrush present: nystatin mouthwash ordered 4 times a day  -Oral hygiene x 4 daily prior to Nystatin treatment  -Double vision--> Eye patch     Comprehensive Multidisciplinary Rehab Program:   -Comprehensive rehab program of PT/OT/SLP - 3 hours a day, 5 days a week.   -PT: strengthening, coordination, balance, endurance, gait,  -OT: strengthening, coordination, fine motor, ADLs  -SLP: dysphagia, dysarthria, memory impairment  _______________________________________________________________________________  CONCURRENT MEDICAL PROBLEMS    Transaminitis:  - AST/ALT level (04/19) 76/95, (04/21) 47/86  - Limit Tylenol use  - Monitor  - Hospitalist F/U      HTN  - goal BP under 160mmHg  -TTE performed: negative for PFO w/ EF 70-75% and mild aortic stenosis -> recommend cardiac follow up outpatient   - lisinopril 20mg daily  - HCTZ 25mg daily  - Monitor /73 - 137/80--> (04/21)     Pain  - Tylenol PRN  - Avoid sedating medications that may interfere with cognitive recovery    Sleep  - Maintain quiet hours and a low stim environment.   - Melatonin PRN     GI / Bowel  - Senna qHS  - Miralax Daily--> Changed to PRN (04/20)   - bisacodyl prn q12h 5mg po  - monitor for loose stools     / Bladder  Urinary Retention  - Currently patient voids: independently  - Bladder scans Q8 hours with straight cath for >400cc.  - Required holden placement previously and TOV started 4/16  - Flomax 0.4mg qhs     Skin / Pressure injury  - Turn q2 hours in bed while awake, air mattress  - nursing to monitor skin qShift  - soft heel protectors  - skin barrier cream for stage 2 pressure wound sacrum 1x1cm daily    Diet/Dysphagia:  - Diet Consistency: DASH diet, regular consistency and thin liquids   - Supplements: B12 1000mcg daily, Vit D 25mcg daily  - Aspiration Precautions  - SLP treatment on going   - Nutrition consult    DVT prophylaxis:   - Lovenox 30mg subq daily  - Last Doppler BLE on 4/10 negative for DVTs      Outpatient Follow-up:  Logan Farmerul  Neurosurgery  [address] [place] [id number]  Phone: [phone]  Fax: [phone]  Follow Up Time: 1    JANET BUSTILLOS is a 75y with HTN and Raynaud's phenomenon admitted to [facility] for 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  Hospital Course complicated by urinary retention, leukocytosis, and chest pain. Patient now admitted for a multidisciplinary rehab program.     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)    -imaging demonstrated acute left pontine hemorrhage in setting of hypertensive emergency likely due to long term uncontrolled hypertension  -initially managed with cardene gtt and adjusted to antihypertensive outlined below  -goal SBP under 160mmHg  -No PFO present on TTE  -evaluated by neurosurgical team and temporarily managed in NSICU - no surgical intervention required   - Keep bed head at 35 degrees all the times and 90 degrees with food out of bed  -dysphagia reevaluated with MBS and upgraded to regular diet with thin liquids  -approved for DVT chemoprophylaxis by neurosurgery   -Repeat MRI of the head in 6 weeks and outpatient neurosurgery follow up (05/21)   -deficits include right sided hemiparesis, facial droop, dysphagia, dysarthria  -Oral thrush present: nystatin mouthwash ordered 4 times a day  -Oral hygiene x 4 daily prior to Nystatin treatment  -Double vision--> Eye patch     Comprehensive Multidisciplinary Rehab Program:   -Comprehensive rehab program of PT/OT/SLP - 3 hours a day, 5 days a week.   -PT: strengthening, coordination, balance, endurance, gait,  -OT: strengthening, coordination, fine motor, ADLs  -SLP: dysphagia, dysarthria, memory impairment  _______________________________________________________________________________  CONCURRENT MEDICAL PROBLEMS    Transaminitis:  - AST/ALT level (04/19) 76/95, (04/21) 47/86  - Limit Tylenol use  - Monitor  - Hospitalist F/U      HTN  - goal BP under 160mmHg  -TTE performed: negative for PFO w/ EF 70-75% and mild aortic stenosis -> recommend cardiac follow up outpatient   - lisinopril 20mg daily  - HCTZ 25mg daily  - Monitor /73 - 137/80--> (04/21)     Pain  - Tylenol PRN  - Avoid sedating medications that may interfere with cognitive recovery    Sleep  - Maintain quiet hours and a low stim environment.   - Melatonin PRN     GI / Bowel  - Senna qHS  - Miralax Daily--> Changed to PRN (04/20)   - bisacodyl prn q12h 5mg po  - monitor for loose stools     / Bladder  Urinary Retention  - Currently patient voids: independently  - Bladder scans Q8 hours with straight cath for >400cc.  - Required holden placement previously and TOV started 4/16  - Flomax 0.4mg qhs     Skin / Pressure injury  - Turn q2 hours in bed while awake, air mattress  - nursing to monitor skin qShift  - soft heel protectors  - skin barrier cream for stage 2 pressure wound Rt bottuck 1x1cm daily    Diet/Dysphagia:  - Diet Consistency: DASH diet, regular consistency and thin liquids   - Supplements: B12 1000mcg daily, Vit D 25mcg daily  - Aspiration Precautions  - SLP treatment on going   - Nutrition consult    DVT prophylaxis:   - Lovenox 30mg subq daily  - Last Doppler BLE on 4/10 negative for DVTs      Outpatient Follow-up:  Logan Farmerul  Neurosurgery  [address] [place] [id number]  Phone: [phone]  Fax: [phone]  Follow Up Time: 1

## 2025-04-21 NOTE — PROGRESS NOTE ADULT - SUBJECTIVE AND OBJECTIVE BOX
HPI:  Michell Neves is a 74 year-old female PMHx of HTN and Raynaud's phenomenon who presented to [facility] ED on 4/9 for dizziness, nausea, and vomiting followed by facial droop and right sided weakness w/ diplopia. Patient found to have SBP over 200mmHg and started on cardene gtt. CTH w/o contrast demonstrated acute pontine hemorrhage and was transferred to [facility] for neurosurgical evaluation. No surgical intervention required. Patient admitted to NSICU and was monitored and treated with antihypertensives. Patient with initial dysphagia but MBS performed and cleared for regular diet w/ thin fluids. Course complicated by urinary retention. TOV initiated on 4/16 and patient started on flomax for a course of at least 3 weeks. Chest pain earlier in admission but workup negative for cardiac original likely related to hypertensive emergency. Leukocytosis now resolved. CXR performed with small bilateral pleural effusions and atelectasis not treated with antibiotics. Patient seen by PT/OT and physiatry recommending acute inpatient rehabilitation. Patient discharged to [facility] on 4/18.     Allergies  No Known Allergies    Subjective:  - Patient was seen and examined at bedside, NAD  - Sleeping well at night, no events  - No pain  - (+) Diplopia and dizziness--> Better this am  - LBM (04/20), voiding without issues. Continent x 2    - Tolerating therapy, motivated   - Her goal is to get better with her walking and vision, go home  - Pressure injury education      ROS: - Denies CP, palpitation, SOB, cough, fever, chills, headache, abdominal pain, N/V/D/C, dysuria, hematuria, joint pain or swelling. (+) dizziness, visual changes      MEDICATIONS  (STANDING):  enoxaparin Injectable 30 milliGRAM(s) SubCutaneous every 24 hours  hydrochlorothiazide 25 milliGRAM(s) Oral daily  lisinopril 20 milliGRAM(s) Oral two times a day  nystatin    Suspension 548660 Unit(s) Oral four times a day  senna 2 Tablet(s) Oral at bedtime  tamsulosin 0.4 milliGRAM(s) Oral at bedtime  zinc oxide 40% Paste 1 Application(s) Topical daily    MEDICATIONS  (PRN):  acetaminophen     Tablet .. 650 milliGRAM(s) Oral every 6 hours PRN Mild Pain (1 - 3)  bisacodyl 5 milliGRAM(s) Oral every 12 hours PRN Constipation  melatonin 3 milliGRAM(s) Oral at bedtime PRN Insomnia      Recent Labs:                        12.8   9.83  )-----------( 323      ( 21 Apr 2025 07:22 )             39.1     04-21    135  |  99  |  22  ----------------------------<  93  4.3   |  28  |  0.65    Ca    9.5      21 Apr 2025 07:22    TPro  7.0  /  Alb  3.1[L]  /  TBili  0.3  /  DBili  x   /  AST  47[H]  /  ALT  86[H]  /  AlkPhos  94  04-21    LIVER FUNCTIONS - ( 21 Apr 2025 07:22 )  Alb: 3.1 g/dL / Pro: 7.0 g/dL / ALK PHOS: 94 U/L / ALT: 86 U/L / AST: 47 U/L / GGT: x             Physical Exam:  Vital Signs Last 24 Hrs  T(C): 36.3 (21 Apr 2025 07:48), Max: 36.7 (20 Apr 2025 20:15)  T(F): 97.4 (21 Apr 2025 07:48), Max: 98.1 (20 Apr 2025 20:15)  HR: 66 (21 Apr 2025 07:48) (66 - 86)  BP: 122/79 (21 Apr 2025 07:48) (122/73 - 137/80)  RR: 14 (21 Apr 2025 07:48) (14 - 16)  SpO2: 97% (21 Apr 2025 07:48) (95% - 97%)  Parameters below as of 21 Apr 2025 07:48  Patient On (Oxygen Delivery Method): room air         Gen - NAD, Comfortable  HEENT - NCAT, EOMI, PERRLA, Eye patch is on  Neck - Supple, No limited ROM  Pulm - CTAB, No crackles  Cardiovascular - RRR, S1S2   Abdomen - Soft, NT, ND   Extremities - No C/C/E, No calf tenderness  Neuro- AAOx 4, follows command. Mild dysarthria, Mild right sided facial droop, diplopia present, visual fields intact, left eye medially directed but EOM are intact, right hemiparesis. Decreased to light touch of bilateral forehead, right cheek, RUE and RLE  Psychiatric - Mood stable, Affect WNL  Skin:  1x1 stage 2 pressure wound of the sacrum                              HPI:  Michell Neves is a 74 year-old female PMHx of HTN and Raynaud's phenomenon who presented to [facility] ED on 4/9 for dizziness, nausea, and vomiting followed by facial droop and right sided weakness w/ diplopia. Patient found to have SBP over 200mmHg and started on cardene gtt. CTH w/o contrast demonstrated acute pontine hemorrhage and was transferred to [facility] for neurosurgical evaluation. No surgical intervention required. Patient admitted to NSICU and was monitored and treated with antihypertensives. Patient with initial dysphagia but MBS performed and cleared for regular diet w/ thin fluids. Course complicated by urinary retention. TOV initiated on 4/16 and patient started on flomax for a course of at least 3 weeks. Chest pain earlier in admission but workup negative for cardiac original likely related to hypertensive emergency. Leukocytosis now resolved. CXR performed with small bilateral pleural effusions and atelectasis not treated with antibiotics. Patient seen by PT/OT and physiatry recommending acute inpatient rehabilitation. Patient discharged to [facility] on 4/18.     Allergies  No Known Allergies    Subjective:  - Patient was seen and examined at bedside, NAD  - Sleeping well at night, no events  - No pain  - (+) Diplopia and dizziness--> Better this am  - LBM (04/20), voiding without issues. Continent x 2    - Tolerating therapy, motivated   - Her goal is to get better with her walking and vision, go home  - Pressure injury education      ROS: - Denies CP, palpitation, SOB, cough, fever, chills, headache, abdominal pain, N/V/D/C, dysuria, hematuria, joint pain or swelling. (+) dizziness, visual changes      MEDICATIONS  (STANDING):  enoxaparin Injectable 30 milliGRAM(s) SubCutaneous every 24 hours  hydrochlorothiazide 25 milliGRAM(s) Oral daily  lisinopril 20 milliGRAM(s) Oral two times a day  nystatin    Suspension 723549 Unit(s) Oral four times a day  senna 2 Tablet(s) Oral at bedtime  tamsulosin 0.4 milliGRAM(s) Oral at bedtime  zinc oxide 40% Paste 1 Application(s) Topical daily    MEDICATIONS  (PRN):  acetaminophen     Tablet .. 650 milliGRAM(s) Oral every 6 hours PRN Mild Pain (1 - 3)  bisacodyl 5 milliGRAM(s) Oral every 12 hours PRN Constipation  melatonin 3 milliGRAM(s) Oral at bedtime PRN Insomnia      Recent Labs:                        12.8   9.83  )-----------( 323      ( 21 Apr 2025 07:22 )             39.1     04-21    135  |  99  |  22  ----------------------------<  93  4.3   |  28  |  0.65    Ca    9.5      21 Apr 2025 07:22    TPro  7.0  /  Alb  3.1[L]  /  TBili  0.3  /  DBili  x   /  AST  47[H]  /  ALT  86[H]  /  AlkPhos  94  04-21    LIVER FUNCTIONS - ( 21 Apr 2025 07:22 )  Alb: 3.1 g/dL / Pro: 7.0 g/dL / ALK PHOS: 94 U/L / ALT: 86 U/L / AST: 47 U/L / GGT: x             Physical Exam:  Vital Signs Last 24 Hrs  T(C): 36.3 (21 Apr 2025 07:48), Max: 36.7 (20 Apr 2025 20:15)  T(F): 97.4 (21 Apr 2025 07:48), Max: 98.1 (20 Apr 2025 20:15)  HR: 66 (21 Apr 2025 07:48) (66 - 86)  BP: 122/79 (21 Apr 2025 07:48) (122/73 - 137/80)  RR: 14 (21 Apr 2025 07:48) (14 - 16)  SpO2: 97% (21 Apr 2025 07:48) (95% - 97%)  Parameters below as of 21 Apr 2025 07:48  Patient On (Oxygen Delivery Method): room air         Gen - NAD, Comfortable  HEENT - NCAT, EOMI, PERRLA, Eye patch is on  Neck - Supple, No limited ROM  Pulm - CTAB, No crackles  Cardiovascular - RRR, S1S2   Abdomen - Soft, NT, ND   Extremities - No C/C/E, No calf tenderness  Neuro- AAOx 4, follows command. Mild dysarthria, Mild right sided facial droop, diplopia present, visual fields intact, left eye medially directed but EOM are intact, right hemiparesis. Decreased to light touch of bilateral forehead, right cheek, RUE and RLE  Psychiatric - Mood stable, Affect WNL  Skin:  1x1 stage 2 pressure wound of the Rt bottuck

## 2025-04-21 NOTE — PROGRESS NOTE ADULT - ASSESSMENT
75y with HTN and Raynaud's phenomenon admitted to [facility] for 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  Hospital Course complicated by urinary retention, leukocytosis, and chest pain. Patient now admitted for a multidisciplinary rehab program.     Acute left dorsal pontine hemorrhage   -imaging demonstrated acute left pontine hemorrhage in setting of hypertensive emergency likely due to long term uncontrolled hypertension  -initially managed with cardene gtt and adjusted to antihypertensive outlined below  -goal SBP under 160mmHg  -No PFO present on TTE  -evaluated by neurosurgical team and temporarily managed in NSICU - no surgical intervention required   -approved for DVT chemoprophylaxis by neurosurgery   -requires repeat MRI of the head in 6 weeks and outpatient neurosurgery follow up   -comprehensive rehab    Transaminitis, improving  - denies abdominal pain  - Limit Tylenol use  - Monitor    HTN  - goal BP under 160mmHg  -TTE performed: negative for PFO w/ EF 70-75% and mild aortic stenosis -> recommend cardiac follow up outpatient   - lisinopril 20mg daily  - HCTZ 25mg daily  - monitor for orthostasis    GI / Bowel  - Senna qHS  - Miralax Daily  - bisacodyl prn q12h 5mg po     / Bladder  Urinary Retention  - Currently patient voids: independently  - Flomax 0.4mg qhs     DVT prophylaxis:   - Lovenox 30mg subq daily  - Last Doppler BLE on 4/10 negative for DVTs

## 2025-04-21 NOTE — PROGRESS NOTE ADULT - SUBJECTIVE AND OBJECTIVE BOX
PROGRESS NOTE:     Patient is a 75y old  Female who presents with a chief complaint of Acute pontine intracranial hemorrhage (19 Apr 2025 10:54)          SUBJECTIVE & OBJECTIVE:   Pt seen and examined at bedside in AM    no overnight events.     REVIEW OF SYSTEMS: remaining ROS negative     PHYSICAL EXAM:  VITALS:  Vital Signs Last 24 Hrs  T(C): 36.3 (21 Apr 2025 07:48), Max: 36.7 (20 Apr 2025 20:15)  T(F): 97.4 (21 Apr 2025 07:48), Max: 98.1 (20 Apr 2025 20:15)  HR: 66 (21 Apr 2025 07:48) (66 - 86)  BP: 122/79 (21 Apr 2025 07:48) (122/73 - 137/80)  BP(mean): --  RR: 14 (21 Apr 2025 07:48) (14 - 16)  SpO2: 97% (21 Apr 2025 07:48) (95% - 97%)    Parameters below as of 21 Apr 2025 07:48  Patient On (Oxygen Delivery Method): room air        GENERAL: NAD,  no increased WOB  HEAD:  Atraumatic, Normocephalic  EYES: EOMI, conjunctiva and sclera clear  ENMT: Moist mucous membranes  NECK: Supple, No JVD  NERVOUS SYSTEM:  Alert & Oriented   CHEST/LUNG: Clear to auscultation bilaterally; No rales, rhonchi, wheezing  HEART: Regular rate and rhythm; No murmurs, rubs, or gallops  ABDOMEN: Soft, Nontender, Nondistended; Bowel sounds present  EXTREMITIES:  No clubbing, cyanosis, calf tenderness or edema b/l      MEDICATIONS  (STANDING):  enoxaparin Injectable 30 milliGRAM(s) SubCutaneous every 24 hours  hydrochlorothiazide 25 milliGRAM(s) Oral daily  lisinopril 20 milliGRAM(s) Oral two times a day  nystatin    Suspension [id number] Unit(s) Oral four times a day  senna 2 Tablet(s) Oral at bedtime  tamsulosin 0.4 milliGRAM(s) Oral at bedtime  zinc oxide 40% Paste 1 Application(s) Topical daily    MEDICATIONS  (PRN):  acetaminophen     Tablet .. 650 milliGRAM(s) Oral every 6 hours PRN Mild Pain (1 - 3)  bisacodyl 5 milliGRAM(s) Oral every 12 hours PRN Constipation  melatonin 3 milliGRAM(s) Oral at bedtime PRN Insomnia      Allergies    No Known Allergies    Intolerances              LABS:                                      12.8   9.83  )-----------( 323      ( 21 Apr 2025 07:22 )             39.1     04-21    135  |  99  |  22  ----------------------------<  93  4.3   |  28  |  0.65    Ca    9.5      21 Apr 2025 07:22    TPro  7.0  /  Alb  3.1[L]  /  TBili  0.3  /  DBili  x   /  AST  47[H]  /  ALT  86[H]  /  AlkPhos  94  04-21    LIVER FUNCTIONS - ( 21 Apr 2025 07:22 )  Alb: 3.1 g/dL / Pro: 7.0 g/dL / ALK PHOS: 94 U/L / ALT: 86 U/L / AST: 47 U/L / GGT: x             Urinalysis Basic - ( 21 Apr 2025 07:22 )    Color: x / Appearance: x / SG: x / pH: x  Gluc: 93 mg/dL / Ketone: x  / Bili: x / Urobili: x   Blood: x / Protein: x / Nitrite: x   Leuk Esterase: x / RBC: x / WBC x   Sq Epi: x / Non Sq Epi: x / Bacteria: x          CAPILLARY BLOOD GLUCOSE                    RECENT CULTURES:          RADIOLOGY & ADDITIONAL TESTS:

## 2025-04-22 PROCEDURE — 99232 SBSQ HOSP IP/OBS MODERATE 35: CPT

## 2025-04-22 PROCEDURE — 99233 SBSQ HOSP IP/OBS HIGH 50: CPT | Mod: GC

## 2025-04-22 RX ORDER — LISINOPRIL 5 MG/1
10 TABLET ORAL
Refills: 0 | Status: DISCONTINUED | OUTPATIENT
Start: 2025-04-22 | End: 2025-04-28

## 2025-04-22 RX ADMIN — Medication 500000 UNIT(S): at 18:02

## 2025-04-22 RX ADMIN — TAMSULOSIN HYDROCHLORIDE 0.4 MILLIGRAM(S): 0.4 CAPSULE ORAL at 21:08

## 2025-04-22 RX ADMIN — Medication 500000 UNIT(S): at 05:41

## 2025-04-22 RX ADMIN — Medication 500000 UNIT(S): at 12:09

## 2025-04-22 RX ADMIN — LISINOPRIL 20 MILLIGRAM(S): 5 TABLET ORAL at 05:41

## 2025-04-22 RX ADMIN — Medication 500000 UNIT(S): at 21:08

## 2025-04-22 RX ADMIN — TOUCHLESS CARE ZINC OXIDE PROTECTANT 1 APPLICATION(S): 20; 25 SPRAY TOPICAL at 05:44

## 2025-04-22 RX ADMIN — ENOXAPARIN SODIUM 30 MILLIGRAM(S): 100 INJECTION SUBCUTANEOUS at 05:41

## 2025-04-22 NOTE — PROGRESS NOTE ADULT - ASSESSMENT
75y with HTN and Raynaud's phenomenon admitted to [facility] for 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  Hospital Course complicated by urinary retention, leukocytosis, and chest pain. Patient now admitted for a multidisciplinary rehab program.     Acute left dorsal pontine hemorrhage   -imaging demonstrated acute left pontine hemorrhage in setting of hypertensive emergency likely due to long term uncontrolled hypertension  -initially managed with cardene gtt and adjusted to antihypertensive outlined below  -goal SBP under 160mmHg  -No PFO present on TTE  -evaluated by neurosurgical team and temporarily managed in NSICU - no surgical intervention required   -approved for DVT chemoprophylaxis by neurosurgery   -requires repeat MRI of the head in 6 weeks and outpatient neurosurgery follow up   -comprehensive rehab    Transaminitis, improving  - denies abdominal pain  - Limit Tylenol use  - Monitor    HTN  - goal BP under 160mmHg  -TTE performed: negative for PFO w/ EF 70-75% and mild aortic stenosis -> recommend cardiac follow up outpatient   - lisinopril 10mg daily  - HCTZ 12.5 mg daily  - monitor for orthostasis    GI / Bowel  - Senna qHS  - Miralax Daily  - bisacodyl prn q12h 5mg po     / Bladder  Urinary Retention  - Currently patient voids: independently  - Flomax 0.4mg qhs     DVT prophylaxis:   - Lovenox 30mg subq daily  - Last Doppler BLE on 4/10 negative for DVTs

## 2025-04-22 NOTE — PROGRESS NOTE ADULT - ASSESSMENT
JANET BUSTILLOS is a 75y with HTN and Raynaud's phenomenon admitted to [facility] for 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  Hospital Course complicated by urinary retention, leukocytosis, and chest pain. Patient now admitted for a multidisciplinary rehab program.     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)    -imaging demonstrated acute left pontine hemorrhage in setting of hypertensive emergency likely due to long term uncontrolled hypertension  -initially managed with cardene gtt and adjusted to antihypertensive outlined below  -goal SBP under 160mmHg  -No PFO present on TTE  -evaluated by neurosurgical team and temporarily managed in NSICU - no surgical intervention required   - Keep bed head at 35 degrees all the times and 90 degrees with food out of bed  -dysphagia reevaluated with MBS and upgraded to regular diet with thin liquids  -approved for DVT chemoprophylaxis by neurosurgery   -Repeat MRI of the head in 6 weeks and outpatient neurosurgery follow up (05/21)   -deficits include right sided hemiparesis, facial droop, dysphagia, dysarthria  -Oral thrush present: nystatin mouthwash ordered 4 times a day  -Oral hygiene x 4 daily prior to Nystatin treatment  -Double vision--> Eye patch     Comprehensive Multidisciplinary Rehab Program:   -Comprehensive rehab program of PT/OT/SLP - 3 hours a day, 5 days a week.   -PT - 90 mins: strengthening, coordination, balance, endurance, gait,  -OT - 60 mins: strengthening, coordination, fine motor, ADLs  -SLP - 30 mins: dysphagia, dysarthria, memory impairment  _______________________________________________________________________________  CONCURRENT MEDICAL PROBLEMS    Transaminitis:  - AST/ALT level (04/19) 76/95, (04/21) 47/86  - Limit Tylenol use  - Monitor  - Hospitalist F/U      HTN  - goal BP under 160mmHg  -TTE performed: negative for PFO w/ EF 70-75% and mild aortic stenosis -> recommend cardiac follow up outpatient   - lisinopril 20mg daily  - HCTZ 25mg daily  - Monitor /73 - 137/80--> (04/21)     Pain  - Tylenol PRN  - Avoid sedating medications that may interfere with cognitive recovery    Sleep  - Maintain quiet hours and a low stim environment.   - Melatonin PRN     GI / Bowel  - Senna qHS  - Miralax Daily--> Changed to PRN (04/20)   - bisacodyl prn q12h 5mg po  - monitor for loose stools     / Bladder  Urinary Retention  - Currently patient voids: independently  - Bladder scans Q8 hours with straight cath for >400cc.  - Required holden placement previously and TOV started 4/16  - Flomax 0.4mg qhs     Skin / Pressure injury  - Turn q2 hours in bed while awake, air mattress  - nursing to monitor skin qShift  - soft heel protectors  - skin barrier cream for stage 2 pressure wound sacrum 1x1cm daily    Diet/Dysphagia:  - Diet Consistency: DASH diet, regular consistency and thin liquids   - Supplements: B12 1000mcg daily, Vit D 25mcg daily  - Aspiration Precautions  - SLP treatment on going   - Nutrition consult    DVT prophylaxis:   - Lovenox 30mg subq daily  - Last Doppler BLE on 4/10 negative for DVTs      Outpatient Follow-up:  Logan Farmerul  Neurosurgery  [address] [place] [address]  Phone: [phone]  Fax: [phone]  Follow Up Time: 1    JANET BUSTILLOS is a 75y with HTN and Raynaud's phenomenon admitted to [facility] for 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  Hospital Course complicated by urinary retention, leukocytosis, and chest pain. Patient now admitted for a multidisciplinary rehab program.     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)    -imaging demonstrated acute left pontine hemorrhage in setting of hypertensive emergency likely due to long term uncontrolled hypertension  -initially managed with cardene gtt and adjusted to antihypertensive outlined below  -goal SBP under 160mmHg  -No PFO present on TTE  -evaluated by neurosurgical team and temporarily managed in NSICU - no surgical intervention required   - Keep bed head at 35 degrees all the times and 90 degrees with food out of bed  -dysphagia reevaluated with MBS and upgraded to regular diet with thin liquids  -approved for DVT chemoprophylaxis by neurosurgery   -Repeat MRI of the head in 6 weeks and outpatient neurosurgery follow up (05/21)   -deficits include right sided hemiparesis, facial droop, dysphagia, dysarthria  -Oral thrush present: nystatin mouthwash ordered 4 times a day  -Oral hygiene x 4 daily prior to Nystatin treatment  -Double vision--> Eye patch     Comprehensive Multidisciplinary Rehab Program:   -Comprehensive rehab program of PT/OT/SLP - 3 hours a day, 5 days a week.   -PT - 90 mins: strengthening, coordination, balance, endurance, gait,  -OT - 60 mins: strengthening, coordination, fine motor, ADLs  -SLP - 30 mins: dysphagia, dysarthria, memory impairment  _______________________________________________________________________________  CONCURRENT MEDICAL PROBLEMS    Transaminitis:  - AST/ALT level (04/19) 76/95, (04/21) 47/86  - Limit Tylenol use  - Monitor  - Hospitalist F/U      HTN  Orthostatic  - goal BP under 160mmHg  -TTE performed: negative for PFO w/ EF 70-75% and mild aortic stenosis -> recommend cardiac follow up outpatient   - lisinopril 20mg daily--> changed to 10 mg daily (04/22)  - HCTZ 25mg daily--> changed to 12.5 mg daily (04/22)   - Abdominal binder, teds   - Monitor /73 - 137/80--> (04/21)     Pain  - Tylenol PRN  - Avoid sedating medications that may interfere with cognitive recovery    Sleep  - Maintain quiet hours and a low stim environment.   - Melatonin PRN     GI / Bowel  - Senna qHS  - Miralax Daily--> Changed to PRN (04/20)   - bisacodyl prn q12h 5mg po  - monitor for loose stools     / Bladder  Urinary Retention  - Currently patient voids: independently  - Bladder scans Q8 hours with straight cath for >400cc. Completed   - Required holden placement previously and TOV started 4/16  - Flomax 0.4mg qhs     Skin / Pressure injury  - Turn q2 hours in bed while awake, air mattress  - nursing to monitor skin qShift  - soft heel protectors  - skin barrier cream for stage 2 pressure wound right buttock 1x1cm daily    Diet/Dysphagia:  - Diet Consistency: DASH diet, regular consistency and thin liquids   - Supplements: B12 1000mcg daily, Vit D 25mcg daily  - Aspiration Precautions  - SLP treatment on going (30 minutes)    - Nutrition consult    DVT prophylaxis:   - Lovenox 30mg subq daily  - Last Doppler BLE on 4/10 negative for DVTs      Outpatient Follow-up:  Logan Farmerul  Neurosurgery  [address] [place] [id number]  Phone: [phone]  Fax: [phone]  Follow Up Time: 1

## 2025-04-22 NOTE — PROGRESS NOTE ADULT - SUBJECTIVE AND OBJECTIVE BOX
HPI:  Michell Neves is a 74 year-old female PMHx of HTN and Raynaud's phenomenon who presented to [facility] ED on 4/9 for dizziness, nausea, and vomiting followed by facial droop and right sided weakness w/ diplopia. Patient found to have SBP over 200mmHg and started on cardene gtt. CTH w/o contrast demonstrated acute pontine hemorrhage and was transferred to [facility] for neurosurgical evaluation. No surgical intervention required. Patient admitted to NSICU and was monitored and treated with antihypertensives. Patient with initial dysphagia but MBS performed and cleared for regular diet w/ thin fluids. Course complicated by urinary retention. TOV initiated on 4/16 and patient started on flomax for a course of at least 3 weeks. Chest pain earlier in admission but workup negative for cardiac original likely related to hypertensive emergency. Leukocytosis now resolved. CXR performed with small bilateral pleural effusions and atelectasis not treated with antibiotics. Patient seen by PT/OT and physiatry recommending acute inpatient rehabilitation. Patient discharged to [facility] on 4/18.     Allergies  No Known Allergies    Subjective:  - Patient was seen and examined at bedside, NAD  - Sleeping well at night, no events  - No pain  - (+) Diplopia and dizziness--> Better this am  - LBM (04/20), voiding without issues. Continent x 2    - Tolerating therapy, motivated   - Her goal is to get better with her walking and vision, go home  - Pressure injury education      ROS: - Denies CP, palpitation, SOB, cough, fever, chills, headache, abdominal pain, N/V/D/C, dysuria, hematuria, joint pain or swelling. (+) dizziness, visual changes      MEDICATIONS  (STANDING):  enoxaparin Injectable 30 milliGRAM(s) SubCutaneous every 24 hours  hydrochlorothiazide 25 milliGRAM(s) Oral daily  lisinopril 20 milliGRAM(s) Oral two times a day  nystatin    Suspension 643878 Unit(s) Oral four times a day  senna 2 Tablet(s) Oral at bedtime  tamsulosin 0.4 milliGRAM(s) Oral at bedtime  zinc oxide 40% Paste 1 Application(s) Topical daily    MEDICATIONS  (PRN):  acetaminophen     Tablet .. 650 milliGRAM(s) Oral every 6 hours PRN Mild Pain (1 - 3)  bisacodyl 5 milliGRAM(s) Oral every 12 hours PRN Constipation  melatonin 3 milliGRAM(s) Oral at bedtime PRN Insomnia          Recent Labs:                        12.8   9.83  )-----------( 323      ( 21 Apr 2025 07:22 )             39.1     04-21    135  |  99  |  22  ----------------------------<  93  4.3   |  28  |  0.65    Ca    9.5      21 Apr 2025 07:22    TPro  7.0  /  Alb  3.1[L]  /  TBili  0.3  /  DBili  x   /  AST  47[H]  /  ALT  86[H]  /  AlkPhos  94  04-21    LIVER FUNCTIONS - ( 21 Apr 2025 07:22 )  Alb: 3.1 g/dL / Pro: 7.0 g/dL / ALK PHOS: 94 U/L / ALT: 86 U/L / AST: 47 U/L / GGT: x             Physical Exam:  Vital Signs Last 24 Hrs  T(C): 36.3 (22 Apr 2025 07:43), Max: 36.5 (21 Apr 2025 19:46)  T(F): 97.3 (22 Apr 2025 07:43), Max: 97.7 (21 Apr 2025 19:46)  HR: 67 (22 Apr 2025 07:43) (67 - 89)  BP: 106/67 (22 Apr 2025 07:43) (106/67 - 135/79)  BP(mean): --  RR: 15 (22 Apr 2025 07:43) (15 - 16)  SpO2: 98% (22 Apr 2025 07:43) (94% - 98%)  Parameters below as of 22 Apr 2025 07:43  Patient On (Oxygen Delivery Method): room air       Gen - NAD, Comfortable  HEENT - NCAT, EOMI, PERRLA, Eye patch is on  Neck - Supple, No limited ROM  Pulm - CTAB, No crackles  Cardiovascular - RRR, S1S2   Abdomen - Soft, NT, ND   Extremities - No C/C/E, No calf tenderness  Neuro- AAOx 4, follows command. Mild dysarthria, Mild right sided facial droop, diplopia present, visual fields intact, left eye medially directed but EOM are intact, right hemiparesis. Decreased to light touch of bilateral forehead, right cheek, RUE and RLE  Psychiatric - Mood stable, Affect WNL  Skin:  1x1 stage 2 pressure wound of the sacrum                              HPI:  Michell Neves is a 74 year-old female PMHx of HTN and Raynaud's phenomenon who presented to [facility] ED on 4/9 for dizziness, nausea, and vomiting followed by facial droop and right sided weakness w/ diplopia. Patient found to have SBP over 200mmHg and started on cardene gtt. CTH w/o contrast demonstrated acute pontine hemorrhage and was transferred to [facility] for neurosurgical evaluation. No surgical intervention required. Patient admitted to NSICU and was monitored and treated with antihypertensives. Patient with initial dysphagia but MBS performed and cleared for regular diet w/ thin fluids. Course complicated by urinary retention. TOV initiated on 4/16 and patient started on flomax for a course of at least 3 weeks. Chest pain earlier in admission but workup negative for cardiac original likely related to hypertensive emergency. Leukocytosis now resolved. CXR performed with small bilateral pleural effusions and atelectasis not treated with antibiotics. Patient seen by PT/OT and physiatry recommending acute inpatient rehabilitation. Patient discharged to [facility] on 4/18.     Allergies  No Known Allergies    Subjective:  - Patient was seen and examined at bedside, NAD  - Sleeping well at night, no events  - No pain  - (+) Diplopia and dizziness--> Better this am, glasses works for her better than the patch   - LBM (04/20), voiding without issues. Continent x 2    - Tolerating therapy, motivated, reduced ST to 30 min, PT 90 min and OT 60 min   - Her goal is to get better with her walking and vision, go home, prefers breakfast prior to therapy   - (+) Stage II, dysarthria, memory deficits   - Pressure injury and stroke education provided    ROS: - Denies CP, palpitation, SOB, cough, fever, chills, headache, abdominal pain, N/V/D/C, dysuria, hematuria, joint pain or swelling. (+) dizziness, visual changes      MEDICATIONS  (STANDING):  enoxaparin Injectable 30 milliGRAM(s) SubCutaneous every 24 hours  hydrochlorothiazide 25 milliGRAM(s) Oral daily  lisinopril 20 milliGRAM(s) Oral two times a day  nystatin    Suspension 218558 Unit(s) Oral four times a day  senna 2 Tablet(s) Oral at bedtime  tamsulosin 0.4 milliGRAM(s) Oral at bedtime  zinc oxide 40% Paste 1 Application(s) Topical daily    MEDICATIONS  (PRN):  acetaminophen     Tablet .. 650 milliGRAM(s) Oral every 6 hours PRN Mild Pain (1 - 3)  bisacodyl 5 milliGRAM(s) Oral every 12 hours PRN Constipation  melatonin 3 milliGRAM(s) Oral at bedtime PRN Insomnia      Recent Labs:                        12.8   9.83  )-----------( 323      ( 21 Apr 2025 07:22 )             39.1     04-21    135  |  99  |  22  ----------------------------<  93  4.3   |  28  |  0.65    Ca    9.5      21 Apr 2025 07:22    TPro  7.0  /  Alb  3.1[L]  /  TBili  0.3  /  DBili  x   /  AST  47[H]  /  ALT  86[H]  /  AlkPhos  94  04-21    LIVER FUNCTIONS - ( 21 Apr 2025 07:22 )  Alb: 3.1 g/dL / Pro: 7.0 g/dL / ALK PHOS: 94 U/L / ALT: 86 U/L / AST: 47 U/L / GGT: x             Physical Exam:  Vital Signs Last 24 Hrs  T(C): 36.3 (22 Apr 2025 07:43), Max: 36.5 (21 Apr 2025 19:46)  T(F): 97.3 (22 Apr 2025 07:43), Max: 97.7 (21 Apr 2025 19:46)  HR: 67 (22 Apr 2025 07:43) (67 - 89)  BP: 106/67 (22 Apr 2025 07:43) (106/67 - 135/79)  RR: 15 (22 Apr 2025 07:43) (15 - 16)  SpO2: 98% (22 Apr 2025 07:43) (94% - 98%)  Parameters below as of 22 Apr 2025 07:43  Patient On (Oxygen Delivery Method): room air         Gen - NAD, Comfortable  HEENT - NCAT, EOMI, PERRLA, Eye patch is on  Neck - Supple, No limited ROM  Pulm - CTAB, No crackles  Cardiovascular - RRR, S1S2   Abdomen - Soft, NT, ND   Extremities - No C/C/E, No calf tenderness  Neuro- AAOx 4, follows command. Mild dysarthria, Mild right sided facial droop, diplopia present, visual fields intact, left eye medially directed but EOM are intact, right hemiparesis. Decreased to light touch of bilateral forehead, right cheek, RUE and RLE  Psychiatric - Mood stable, Affect WNL  Skin:  1x1 stage 2 pressure wound of the right buttock

## 2025-04-22 NOTE — PROGRESS NOTE ADULT - SUBJECTIVE AND OBJECTIVE BOX
CC: Patient is a 75y old  Female who presents with a chief complaint of Acute pontine intracranial hemorrhage.    Interval History:  Patient seen and examined at bedside.  No overnight events  No complaints this morning  Vitally stable    ALLERGIES:  No Known Allergies    MEDICATIONS  (STANDING):  enoxaparin Injectable 30 milliGRAM(s) SubCutaneous every 24 hours  hydrochlorothiazide 12.5 milliGRAM(s) Oral daily  lisinopril 10 milliGRAM(s) Oral two times a day  nystatin    Suspension [id number] Unit(s) Oral four times a day  senna 2 Tablet(s) Oral at bedtime  tamsulosin 0.4 milliGRAM(s) Oral at bedtime  zinc oxide 40% Paste 1 Application(s) Topical daily    MEDICATIONS  (PRN):  acetaminophen     Tablet .. 650 milliGRAM(s) Oral every 6 hours PRN Mild Pain (1 - 3)  bisacodyl 5 milliGRAM(s) Oral every 12 hours PRN Constipation  melatonin 3 milliGRAM(s) Oral at bedtime PRN Insomnia    Vital Signs Last 24 Hrs  T(F): 97.3 (22 Apr 2025 07:43), Max: 97.7 (21 Apr 2025 19:46)  HR: 67 (22 Apr 2025 07:43) (67 - 89)  BP: 106/67 (22 Apr 2025 07:43) (106/67 - 135/79)  RR: 15 (22 Apr 2025 07:43) (15 - 16)  SpO2: 98% (22 Apr 2025 07:43) (94% - 98%)  I&O's Summary    BMI (kg/m2): 20.3 (04-18-25 @ 14:39)    PHYSICAL EXAM:  GENERAL: NAD  NERVOUS SYSTEM: Awake, alert  CHEST/LUNG: Clear to percussion bilaterally; No rales, rhonchi, wheezing, or rubs; normal respiratory effort, no intercostal retractions  HEART: Regular rate and rhythm; No murmurs, rubs, or gallops; No pitting edema  ABDOMEN: Soft, Nontender, Nondistended; Bowel sounds present; No HSM or masses  MUSCULOSKELETAL/EXTREMITIES:  2+ Peripheral Pulses, No clubbing or digital cyanosis    LABS:                        12.8   9.83  )-----------( 323      ( 21 Apr 2025 07:22 )             39.1       04-21    135  |  99  |  22  ----------------------------<  93  4.3   |  28  |  0.65    Ca    9.5      21 Apr 2025 07:22    TPro  7.0  /  Alb  3.1  /  TBili  0.3  /  DBili  x   /  AST  47  /  ALT  86  /  AlkPhos  94  04-21                04-09 Chol 211 mg/dL LDL -- HDL 95 mg/dL Trig 51 mg/dL                  Urinalysis Basic - ( 21 Apr 2025 07:22 )    Color: x / Appearance: x / SG: x / pH: x  Gluc: 93 mg/dL / Ketone: x  / Bili: x / Urobili: x   Blood: x / Protein: x / Nitrite: x   Leuk Esterase: x / RBC: x / WBC x   Sq Epi: x / Non Sq Epi: x / Bacteria: x        COVID-19 PCR: NotDetec (04-18-25 @ 21:00)      Care Discussed with Consultants/Other Providers: Yes

## 2025-04-22 NOTE — PROGRESS NOTE ADULT - NSPROGADDITIONALINFOA_GEN_ALL_CORE
Spent 1 hour on evaluating, examining and with team meeting where we discussed patient's progress and discharge plan excluding teaching time

## 2025-04-23 PROCEDURE — 99232 SBSQ HOSP IP/OBS MODERATE 35: CPT

## 2025-04-23 PROCEDURE — 99232 SBSQ HOSP IP/OBS MODERATE 35: CPT | Mod: GC

## 2025-04-23 RX ADMIN — Medication 500000 UNIT(S): at 06:00

## 2025-04-23 RX ADMIN — Medication 500000 UNIT(S): at 12:11

## 2025-04-23 RX ADMIN — TAMSULOSIN HYDROCHLORIDE 0.4 MILLIGRAM(S): 0.4 CAPSULE ORAL at 21:07

## 2025-04-23 RX ADMIN — Medication 500000 UNIT(S): at 17:11

## 2025-04-23 RX ADMIN — TOUCHLESS CARE ZINC OXIDE PROTECTANT 1 APPLICATION(S): 20; 25 SPRAY TOPICAL at 06:05

## 2025-04-23 RX ADMIN — ENOXAPARIN SODIUM 30 MILLIGRAM(S): 100 INJECTION SUBCUTANEOUS at 06:00

## 2025-04-23 RX ADMIN — LISINOPRIL 10 MILLIGRAM(S): 5 TABLET ORAL at 06:00

## 2025-04-23 NOTE — PROGRESS NOTE ADULT - ASSESSMENT
75y with HTN and Raynaud's phenomenon admitted to [facility] for 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  Hospital Course complicated by urinary retention, leukocytosis, and chest pain. Patient now admitted for a multidisciplinary rehab program.     Acute left dorsal pontine hemorrhage   -goal SBP under 160mmHg  -No PFO present on TTE  -evaluated by neurosurgical team and temporarily managed in NSICU - no surgical intervention required   -approved for DVT chemoprophylaxis by neurosurgery   -requires repeat MRI of the head in 6 weeks and outpatient neurosurgery follow up     Transaminitis, improving  - denies abdominal pain  - Limit Tylenol use  - Monitor    HTN  - goal BP under 160mmHg  -TTE performed: negative for PFO w/ EF 70-75% and mild aortic stenosis -> recommend cardiac follow up outpatient   - lisinopril 10mg daily  - HCTZ 12.5 mg daily    Urinary Retention  - Currently patient voids: independently  - Flomax 0.4mg qhs     DVT prophylaxis:  Lovenox       75y with HTN and Raynaud's phenomenon admitted to [facility] for 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  Hospital Course complicated by urinary retention, leukocytosis, and chest pain. Patient now admitted for a multidisciplinary rehab program.     Acute left dorsal pontine hemorrhage   -goal SBP under 160mmHg  -No PFO present on TTE  -evaluated by neurosurgical team and temporarily managed in NSICU - no surgical intervention required   -approved for DVT chemoprophylaxis by neurosurgery   -requires repeat MRI of the head in 6 weeks and outpatient neurosurgery follow up     Transaminitis, improving  - denies abdominal pain  - Limit Tylenol use  - Monitor    HTN  - goal BP under 160mmHg  -TTE performed: negative for PFO w/ EF 70-75% and mild aortic stenosis -> recommend cardiac follow up outpatient   - lisinopril 10mg daily  - Will d/c HCTZ 12.5 mg daily    Urinary Retention  - Currently patient voids: independently  - Flomax 0.4mg qhs     DVT prophylaxis:  Lovenox

## 2025-04-23 NOTE — PROGRESS NOTE ADULT - ASSESSMENT
JANET BUSTILLOS is a 75y with HTN and Raynaud's phenomenon admitted to [facility] for 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  Hospital Course complicated by urinary retention, leukocytosis, and chest pain. Patient now admitted for a multidisciplinary rehab program.     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)    -imaging demonstrated acute left pontine hemorrhage in setting of hypertensive emergency likely due to long term uncontrolled hypertension  -initially managed with cardene gtt and adjusted to antihypertensive outlined below  -goal SBP under 160mmHg  -No PFO present on TTE  -evaluated by neurosurgical team and temporarily managed in NSICU - no surgical intervention required   - Keep bed head at 35 degrees all the times and 90 degrees with food out of bed  -dysphagia reevaluated with MBS and upgraded to regular diet with thin liquids  -approved for DVT chemoprophylaxis by neurosurgery   -Repeat MRI of the head in 6 weeks and outpatient neurosurgery follow up (05/21)   -deficits include right sided hemiparesis, facial droop, dysphagia, dysarthria  -Oral thrush present: nystatin mouthwash ordered 4 times a day  -Oral hygiene x 4 daily prior to Nystatin treatment  -Double vision--> Eye patch     Comprehensive Multidisciplinary Rehab Program:   -Comprehensive rehab program of PT/OT/SLP - 3 hours a day, 5 days a week.   -PT - 90 mins: strengthening, coordination, balance, endurance, gait,  -OT - 60 mins: strengthening, coordination, fine motor, ADLs  -SLP - 30 mins: dysphagia, dysarthria, memory impairment  _______________________________________________________________________________  CONCURRENT MEDICAL PROBLEMS    Transaminitis:  - AST/ALT level (04/19) 76/95, (04/21) 47/86  - Limit Tylenol use  - Monitor  - Hospitalist F/U      HTN  Orthostatic  - goal BP under 160mmHg  -TTE performed: negative for PFO w/ EF 70-75% and mild aortic stenosis -> recommend cardiac follow up outpatient   - lisinopril 20mg daily--> changed to 10 mg daily (04/22)  - HCTZ 25mg daily--> changed to 12.5 mg daily (04/22)--> Refused to take it this am    - Abdominal binder, teds   - Monitor /70 - 131/75--> (04/23)   - Hospitalist to manage meds     Pain  - Tylenol PRN  - Avoid sedating medications that may interfere with cognitive recovery    Sleep  - Maintain quiet hours and a low stim environment.   - Melatonin PRN     GI / Bowel  - Senna qHS--> D/C  - Miralax Daily--> Changed to PRN (04/20)   - bisacodyl prn q12h 5mg po--> D/C   - monitor for loose stools     / Bladder  Urinary Retention  - Currently patient voids: independently  - Bladder scans Q8 hours with straight cath for >400cc. Completed   - Required holden placement previously and TOV started 4/16  - Flomax 0.4mg qhs     Skin / Pressure injury  - Turn q2 hours in bed while awake, air mattress  - nursing to monitor skin qShift  - soft heel protectors  - skin barrier cream for stage 2 pressure wound right buttock 1x1cm daily    Diet/Dysphagia:  - Diet Consistency: DASH diet, regular consistency and thin liquids   - Supplements: B12 1000mcg daily, Vit D 25mcg daily  - Aspiration Precautions  - SLP treatment on going (30 minutes)    - Nutrition consult    DVT prophylaxis:   - Lovenox 30mg subq daily  - Last Doppler BLE on 4/10 negative for DVTs      Outpatient Follow-up:  Logan Farmerul  Neurosurgery  [address] [place] [id number]  Phone: [phone]  Fax: [phone]  Follow Up Time: 1

## 2025-04-23 NOTE — PROGRESS NOTE ADULT - SUBJECTIVE AND OBJECTIVE BOX
Patient is a 75y old  Female who presents with a chief complaint of Acute pontine intracranial hemorrhage     REports having dizziness; visual disturbance in left eye    Patient seen and examined at bedside.    ALLERGIES:  No Known Allergies    MEDICATIONS  (STANDING):  enoxaparin Injectable 30 milliGRAM(s) SubCutaneous every 24 hours  hydrochlorothiazide 12.5 milliGRAM(s) Oral daily  lisinopril 10 milliGRAM(s) Oral two times a day  nystatin    Suspension [id number] Unit(s) Oral four times a day  senna 2 Tablet(s) Oral at bedtime  tamsulosin 0.4 milliGRAM(s) Oral at bedtime  zinc oxide 40% Paste 1 Application(s) Topical daily    MEDICATIONS  (PRN):  acetaminophen     Tablet .. 650 milliGRAM(s) Oral every 6 hours PRN Mild Pain (1 - 3)  bisacodyl 5 milliGRAM(s) Oral every 12 hours PRN Constipation  melatonin 3 milliGRAM(s) Oral at bedtime PRN Insomnia    Vital Signs Last 24 Hrs  T(F): 98 (23 Apr 2025 07:31), Max: 98 (22 Apr 2025 19:30)  HR: 70 (23 Apr 2025 07:31) (70 - 84)  BP: 131/75 (23 Apr 2025 07:31) (110/70 - 131/75)  RR: 15 (23 Apr 2025 07:31) (15 - 15)  SpO2: 97% (23 Apr 2025 07:31) (97% - 98%)  I&O's Summary    BMI (kg/m2): 20.3 (04-18-25 @ 14:39)  PHYSICAL EXAM:  General: NAD, A/O  ENT: MMM, no scleral icterus  Neck: Supple, No JVD, no thyroidomegaly  Lungs: Clear to auscultation bilaterally, no wheezes, no rales, no rhonchi, good inspiratory effort  Cardio: RRR, S1/S2, No murmurs  Abdomen: Soft, Nontender, Nondistended; Bowel sounds present  Extremities: No calf tenderness, No pitting edema, no skin changes    LABS:                        12.8   9.83  )-----------( 323      ( 21 Apr 2025 07:22 )             39.1       04-21    135  |  99  |  22  ----------------------------<  93  4.3   |  28  |  0.65    Ca    9.5      21 Apr 2025 07:22    TPro  7.0  /  Alb  3.1  /  TBili  0.3  /  DBili  x   /  AST  47  /  ALT  86  /  AlkPhos  94  04-21     04-09 Chol 211 mg/dL LDL -- HDL 95 mg/dL Trig 51 mg/dL    Urinalysis Basic - ( 21 Apr 2025 07:22 )    Color: x / Appearance: x / SG: x / pH: x  Gluc: 93 mg/dL / Ketone: x  / Bili: x / Urobili: x   Blood: x / Protein: x / Nitrite: x   Leuk Esterase: x / RBC: x / WBC x   Sq Epi: x / Non Sq Epi: x / Bacteria: x    COVID-19 PCR: NotDetec (04-18-25 @ 21:00)  COVID-19 PCR: NotDetec (04-18-25 @ 21:00)

## 2025-04-23 NOTE — PROGRESS NOTE ADULT - SUBJECTIVE AND OBJECTIVE BOX
HPI:  Michell Neves is a 74 year-old female PMHx of HTN and Raynaud's phenomenon who presented to [facility] ED on 4/9 for dizziness, nausea, and vomiting followed by facial droop and right sided weakness w/ diplopia. Patient found to have SBP over 200mmHg and started on cardene gtt. CTH w/o contrast demonstrated acute pontine hemorrhage and was transferred to [facility] for neurosurgical evaluation. No surgical intervention required. Patient admitted to NSICU and was monitored and treated with antihypertensives. Patient with initial dysphagia but MBS performed and cleared for regular diet w/ thin fluids. Course complicated by urinary retention. TOV initiated on 4/16 and patient started on flomax for a course of at least 3 weeks. Chest pain earlier in admission but workup negative for cardiac original likely related to hypertensive emergency. Leukocytosis now resolved. CXR performed with small bilateral pleural effusions and atelectasis not treated with antibiotics. Patient seen by PT/OT and physiatry recommending acute inpatient rehabilitation. Patient discharged to [facility] on 4/18.     Allergies  No Known Allergies    Subjective:  - Patient was seen and examined at bedside, NAD  - Sleeping well at night, no events  - No pain  - Patient was orthostatic yesterday, HCTZ and Lisinopril were adjusted, this am doing better   - (+) Diplopia and dizziness--> Better this am, glasses works for her better than the patch   - LBM (04/21), voiding without issues. Continent x 2    - Tolerating therapy, motivated, reduced ST to 30 min, PT 90 min and OT 60 min   - Her goal is to get better with her walking and vision, go home, prefers breakfast prior to therapy   - (+) Stage II, dysarthria, memory deficits   - Pressure injury and stroke education provided    ROS: - Denies CP, palpitation, SOB, cough, fever, chills, headache, abdominal pain, N/V/D/C, dysuria, hematuria, joint pain or swelling. (+) dizziness, visual changes      MEDICATIONS  (STANDING):  enoxaparin Injectable 30 milliGRAM(s) SubCutaneous every 24 hours  hydrochlorothiazide 12.5 milliGRAM(s) Oral daily  lisinopril 10 milliGRAM(s) Oral two times a day  nystatin    Suspension [id number] Unit(s) Oral four times a day  senna 2 Tablet(s) Oral at bedtime  tamsulosin 0.4 milliGRAM(s) Oral at bedtime  zinc oxide 40% Paste 1 Application(s) Topical daily    MEDICATIONS  (PRN):  acetaminophen     Tablet .. 650 milliGRAM(s) Oral every 6 hours PRN Mild Pain (1 - 3)  bisacodyl 5 milliGRAM(s) Oral every 12 hours PRN Constipation  melatonin 3 milliGRAM(s) Oral at bedtime PRN Insomnia        Recent Labs:                        12.8   9.83  )-----------( 323      ( 21 Apr 2025 07:22 )             39.1     04-21    135  |  99  |  22  ----------------------------<  93  4.3   |  28  |  0.65    Ca    9.5      21 Apr 2025 07:22    TPro  7.0  /  Alb  3.1[L]  /  TBili  0.3  /  DBili  x   /  AST  47[H]  /  ALT  86[H]  /  AlkPhos  94  04-21    LIVER FUNCTIONS - ( 21 Apr 2025 07:22 )  Alb: 3.1 g/dL / Pro: 7.0 g/dL / ALK PHOS: 94 U/L / ALT: 86 U/L / AST: 47 U/L / GGT: x             Physical Exam:  Vital Signs Last 24 Hrs  T(C): 36.7 (23 Apr 2025 07:31), Max: 36.7 (22 Apr 2025 19:30)  T(F): 98 (23 Apr 2025 07:31), Max: 98 (22 Apr 2025 19:30)  HR: 70 (23 Apr 2025 07:31) (70 - 84)  BP: 131/75 (23 Apr 2025 07:31) (110/70 - 131/75)  RR: 15 (23 Apr 2025 07:31) (15 - 15)  SpO2: 97% (23 Apr 2025 07:31) (97% - 98%)  Parameters below as of 23 Apr 2025 07:31  Patient On (Oxygen Delivery Method): room air     Gen - NAD, Comfortable  HEENT - NCAT, EOMI, PERRLA, Eye patch is on  Neck - Supple, No limited ROM  Pulm - CTAB, No crackles  Cardiovascular - RRR, S1S2   Abdomen - Soft, NT, ND   Extremities - No C/C/E, No calf tenderness  Neuro- AAOx 4, follows command. Mild dysarthria, Mild right sided facial droop, diplopia present, visual fields intact, left eye medially directed but EOM are intact, right hemiparesis. Decreased to light touch of bilateral forehead, right cheek, RUE and RLE  Psychiatric - Mood stable, Affect WNL  Skin:  1x1 stage 2 pressure wound of the right buttock

## 2025-04-24 LAB
ALBUMIN SERPL ELPH-MCNC: 2.8 G/DL — LOW (ref 3.3–5)
ALP SERPL-CCNC: 92 U/L — SIGNIFICANT CHANGE UP (ref 40–120)
ALT FLD-CCNC: 51 U/L — HIGH (ref 10–45)
ANION GAP SERPL CALC-SCNC: 8 MMOL/L — SIGNIFICANT CHANGE UP (ref 5–17)
AST SERPL-CCNC: 32 U/L — SIGNIFICANT CHANGE UP (ref 10–40)
BASOPHILS # BLD AUTO: 0.08 K/UL — SIGNIFICANT CHANGE UP (ref 0–0.2)
BASOPHILS NFR BLD AUTO: 1.1 % — SIGNIFICANT CHANGE UP (ref 0–2)
BILIRUB SERPL-MCNC: 0.2 MG/DL — SIGNIFICANT CHANGE UP (ref 0.2–1.2)
BUN SERPL-MCNC: 22 MG/DL — SIGNIFICANT CHANGE UP (ref 7–23)
CALCIUM SERPL-MCNC: 9.3 MG/DL — SIGNIFICANT CHANGE UP (ref 8.4–10.5)
CHLORIDE SERPL-SCNC: 99 MMOL/L — SIGNIFICANT CHANGE UP (ref 96–108)
CO2 SERPL-SCNC: 26 MMOL/L — SIGNIFICANT CHANGE UP (ref 22–31)
CREAT SERPL-MCNC: 0.64 MG/DL — SIGNIFICANT CHANGE UP (ref 0.5–1.3)
EGFR: 91 ML/MIN/1.73M2 — SIGNIFICANT CHANGE UP
EGFR: 91 ML/MIN/1.73M2 — SIGNIFICANT CHANGE UP
EOSINOPHIL # BLD AUTO: 0.23 K/UL — SIGNIFICANT CHANGE UP (ref 0–0.5)
EOSINOPHIL NFR BLD AUTO: 3.2 % — SIGNIFICANT CHANGE UP (ref 0–6)
GLUCOSE SERPL-MCNC: 81 MG/DL — SIGNIFICANT CHANGE UP (ref 70–99)
HCT VFR BLD CALC: 36.1 % — SIGNIFICANT CHANGE UP (ref 34.5–45)
HGB BLD-MCNC: 12 G/DL — SIGNIFICANT CHANGE UP (ref 11.5–15.5)
IMM GRANULOCYTES NFR BLD AUTO: 0.3 % — SIGNIFICANT CHANGE UP (ref 0–0.9)
LYMPHOCYTES # BLD AUTO: 1.38 K/UL — SIGNIFICANT CHANGE UP (ref 1–3.3)
LYMPHOCYTES # BLD AUTO: 19.1 % — SIGNIFICANT CHANGE UP (ref 13–44)
MCHC RBC-ENTMCNC: 30.2 PG — SIGNIFICANT CHANGE UP (ref 27–34)
MCHC RBC-ENTMCNC: 33.2 G/DL — SIGNIFICANT CHANGE UP (ref 32–36)
MCV RBC AUTO: 90.9 FL — SIGNIFICANT CHANGE UP (ref 80–100)
MONOCYTES # BLD AUTO: 0.88 K/UL — SIGNIFICANT CHANGE UP (ref 0–0.9)
MONOCYTES NFR BLD AUTO: 12.2 % — SIGNIFICANT CHANGE UP (ref 2–14)
NEUTROPHILS # BLD AUTO: 4.62 K/UL — SIGNIFICANT CHANGE UP (ref 1.8–7.4)
NEUTROPHILS NFR BLD AUTO: 64.1 % — SIGNIFICANT CHANGE UP (ref 43–77)
NRBC BLD AUTO-RTO: 0 /100 WBCS — SIGNIFICANT CHANGE UP (ref 0–0)
PLATELET # BLD AUTO: 307 K/UL — SIGNIFICANT CHANGE UP (ref 150–400)
POTASSIUM SERPL-MCNC: 4.5 MMOL/L — SIGNIFICANT CHANGE UP (ref 3.5–5.3)
POTASSIUM SERPL-SCNC: 4.5 MMOL/L — SIGNIFICANT CHANGE UP (ref 3.5–5.3)
PROT SERPL-MCNC: 6.3 G/DL — SIGNIFICANT CHANGE UP (ref 6–8.3)
RBC # BLD: 3.97 M/UL — SIGNIFICANT CHANGE UP (ref 3.8–5.2)
RBC # FLD: 14.2 % — SIGNIFICANT CHANGE UP (ref 10.3–14.5)
SODIUM SERPL-SCNC: 133 MMOL/L — LOW (ref 135–145)
WBC # BLD: 7.21 K/UL — SIGNIFICANT CHANGE UP (ref 3.8–10.5)
WBC # FLD AUTO: 7.21 K/UL — SIGNIFICANT CHANGE UP (ref 3.8–10.5)

## 2025-04-24 PROCEDURE — 99232 SBSQ HOSP IP/OBS MODERATE 35: CPT

## 2025-04-24 PROCEDURE — 99232 SBSQ HOSP IP/OBS MODERATE 35: CPT | Mod: GC

## 2025-04-24 RX ADMIN — LISINOPRIL 10 MILLIGRAM(S): 5 TABLET ORAL at 06:07

## 2025-04-24 RX ADMIN — Medication 500000 UNIT(S): at 00:05

## 2025-04-24 RX ADMIN — Medication 500000 UNIT(S): at 06:07

## 2025-04-24 RX ADMIN — Medication 500000 UNIT(S): at 17:43

## 2025-04-24 RX ADMIN — LISINOPRIL 10 MILLIGRAM(S): 5 TABLET ORAL at 17:43

## 2025-04-24 RX ADMIN — ENOXAPARIN SODIUM 30 MILLIGRAM(S): 100 INJECTION SUBCUTANEOUS at 06:07

## 2025-04-24 RX ADMIN — Medication 500000 UNIT(S): at 12:11

## 2025-04-24 RX ADMIN — TOUCHLESS CARE ZINC OXIDE PROTECTANT 1 APPLICATION(S): 20; 25 SPRAY TOPICAL at 06:08

## 2025-04-24 NOTE — PROGRESS NOTE ADULT - SUBJECTIVE AND OBJECTIVE BOX
HPI:  Michell Neves is a 74 year-old female PMHx of HTN and Raynaud's phenomenon who presented to [facility] ED on 4/9 for dizziness, nausea, and vomiting followed by facial droop and right sided weakness w/ diplopia. Patient found to have SBP over 200mmHg and started on cardene gtt. CTH w/o contrast demonstrated acute pontine hemorrhage and was transferred to [facility] for neurosurgical evaluation. No surgical intervention required. Patient admitted to NSICU and was monitored and treated with antihypertensives. Patient with initial dysphagia but MBS performed and cleared for regular diet w/ thin fluids. Course complicated by urinary retention. TOV initiated on 4/16 and patient started on flomax for a course of at least 3 weeks. Chest pain earlier in admission but workup negative for cardiac original likely related to hypertensive emergency. Leukocytosis now resolved. CXR performed with small bilateral pleural effusions and atelectasis not treated with antibiotics. Patient seen by PT/OT and physiatry recommending acute inpatient rehabilitation. Patient discharged to [facility] on 4/18.     Allergies  No Known Allergies    Subjective:  - Patient was seen and examined at bedside, NAD  - Sleeping well at night, no events  - No pain  - Patient was orthostatic yesterday, HCTZ and Lisinopril were adjusted, this am doing better   - (+) Diplopia and dizziness--> Better this am, glasses works for her better than the patch   - LBM (04/21), voiding without issues. Continent x 2    - Tolerating therapy, motivated, reduced ST to 30 min, PT 90 min and OT 60 min   - Her goal is to get better with her walking and vision, go home, prefers breakfast prior to therapy   - (+) Stage II, dysarthria, memory deficits   - Pressure injury and stroke education provided    ROS: - Denies CP, palpitation, SOB, cough, fever, chills, headache, abdominal pain, N/V/D/C, dysuria, hematuria, joint pain or swelling. (+) dizziness, visual changes      MEDICATIONS  (STANDING):  enoxaparin Injectable 30 milliGRAM(s) SubCutaneous every 24 hours  hydrochlorothiazide 12.5 milliGRAM(s) Oral daily  lisinopril 10 milliGRAM(s) Oral two times a day  nystatin    Suspension 700274 Unit(s) Oral four times a day  senna 2 Tablet(s) Oral at bedtime  tamsulosin 0.4 milliGRAM(s) Oral at bedtime  zinc oxide 40% Paste 1 Application(s) Topical daily    MEDICATIONS  (PRN):  acetaminophen     Tablet .. 650 milliGRAM(s) Oral every 6 hours PRN Mild Pain (1 - 3)  bisacodyl 5 milliGRAM(s) Oral every 12 hours PRN Constipation  melatonin 3 milliGRAM(s) Oral at bedtime PRN Insomnia        Recent Labs:                        12.8   9.83  )-----------( 323      ( 21 Apr 2025 07:22 )             39.1     04-21    135  |  99  |  22  ----------------------------<  93  4.3   |  28  |  0.65    Ca    9.5      21 Apr 2025 07:22    TPro  7.0  /  Alb  3.1[L]  /  TBili  0.3  /  DBili  x   /  AST  47[H]  /  ALT  86[H]  /  AlkPhos  94  04-21    LIVER FUNCTIONS - ( 21 Apr 2025 07:22 )  Alb: 3.1 g/dL / Pro: 7.0 g/dL / ALK PHOS: 94 U/L / ALT: 86 U/L / AST: 47 U/L / GGT: x             Physical Exam:  Vital Signs Last 24 Hrs  T(C): 36.7 (23 Apr 2025 07:31), Max: 36.7 (22 Apr 2025 19:30)  T(F): 98 (23 Apr 2025 07:31), Max: 98 (22 Apr 2025 19:30)  HR: 70 (23 Apr 2025 07:31) (70 - 84)  BP: 131/75 (23 Apr 2025 07:31) (110/70 - 131/75)  RR: 15 (23 Apr 2025 07:31) (15 - 15)  SpO2: 97% (23 Apr 2025 07:31) (97% - 98%)  Parameters below as of 23 Apr 2025 07:31  Patient On (Oxygen Delivery Method): room air     Gen - NAD, Comfortable  HEENT - NCAT, EOMI, PERRLA, Eye patch is on  Neck - Supple, No limited ROM  Pulm - CTAB, No crackles  Cardiovascular - RRR, S1S2   Abdomen - Soft, NT, ND   Extremities - No C/C/E, No calf tenderness  Neuro- AAOx 4, follows command. Mild dysarthria, Mild right sided facial droop, diplopia present, visual fields intact, left eye medially directed but EOM are intact, right hemiparesis. Decreased to light touch of bilateral forehead, right cheek, RUE and RLE  Psychiatric - Mood stable, Affect WNL  Skin:  1x1 stage 2 pressure wound of the right buttock                               HPI:  Michell Neves is a 74 year-old female PMHx of HTN and Raynaud's phenomenon who presented to [facility] ED on 4/9 for dizziness, nausea, and vomiting followed by facial droop and right sided weakness w/ diplopia. Patient found to have SBP over 200mmHg and started on cardene gtt. CTH w/o contrast demonstrated acute pontine hemorrhage and was transferred to [facility] for neurosurgical evaluation. No surgical intervention required. Patient admitted to NSICU and was monitored and treated with antihypertensives. Patient with initial dysphagia but MBS performed and cleared for regular diet w/ thin fluids. Course complicated by urinary retention. TOV initiated on 4/16 and patient started on flomax for a course of at least 3 weeks. Chest pain earlier in admission but workup negative for cardiac original likely related to hypertensive emergency. Leukocytosis now resolved. CXR performed with small bilateral pleural effusions and atelectasis not treated with antibiotics. Patient seen by PT/OT and physiatry recommending acute inpatient rehabilitation. Patient discharged to [facility] on 4/18.     Allergies  No Known Allergies    Subjective:  - Patient was seen and examined at bedside, NAD  - Sleeping well at night, no events  - No pain  - BP is better after D/C HCTZ  - (+) Diplopia and dizziness   - LBM (04/23), voiding without issues. Continent x 2    - Tolerating therapy, motivated, reduced ST to 30 min, PT 90 min and OT 60 min   - Her goal is to get better with her walking and vision, go home, prefers breakfast prior to therapy   - (+) Stage II, dysarthria, memory deficits   - Pressure injury and stroke education provided  - TDD (05/02) home     ROS: - Denies CP, palpitation, SOB, cough, fever, chills, headache, abdominal pain, N/V/D/C, dysuria, hematuria, joint pain or swelling. (+) dizziness, visual changes      MEDICATIONS  (STANDING):  enoxaparin Injectable 30 milliGRAM(s) SubCutaneous every 24 hours  hydrochlorothiazide 12.5 milliGRAM(s) Oral daily  lisinopril 10 milliGRAM(s) Oral two times a day  nystatin    Suspension 174121 Unit(s) Oral four times a day  senna 2 Tablet(s) Oral at bedtime  tamsulosin 0.4 milliGRAM(s) Oral at bedtime  zinc oxide 40% Paste 1 Application(s) Topical daily    MEDICATIONS  (PRN):  acetaminophen     Tablet .. 650 milliGRAM(s) Oral every 6 hours PRN Mild Pain (1 - 3)  bisacodyl 5 milliGRAM(s) Oral every 12 hours PRN Constipation  melatonin 3 milliGRAM(s) Oral at bedtime PRN Insomnia        Recent Labs:                        12.0   7.21  )-----------( 307      ( 24 Apr 2025 05:37 )             36.1     04-24    133[L]  |  99  |  22  ----------------------------<  81  4.5   |  26  |  0.64    Ca    9.3      24 Apr 2025 05:37    TPro  6.3  /  Alb  2.8[L]  /  TBili  0.2  /  DBili  x   /  AST  32  /  ALT  51[H]  /  AlkPhos  92  04-24    LIVER FUNCTIONS - ( 24 Apr 2025 05:37 )  Alb: 2.8 g/dL / Pro: 6.3 g/dL / ALK PHOS: 92 U/L / ALT: 51 U/L / AST: 32 U/L / GGT: x             Physical Exam:  Vital Signs Last 24 Hrs  T(C): 36.3 (24 Apr 2025 08:25), Max: 36.5 (23 Apr 2025 20:47)  T(F): 97.4 (24 Apr 2025 08:25), Max: 97.7 (23 Apr 2025 20:47)  HR: 79 (24 Apr 2025 08:25) (67 - 82)  BP: 126/70 (24 Apr 2025 08:25) (109/69 - 137/73)  RR: 17 (24 Apr 2025 08:25) (15 - 17)  SpO2: 96% (24 Apr 2025 08:25) (96% - 98%)  Parameters below as of 24 Apr 2025 08:25  Patient On (Oxygen Delivery Method): room air      Gen - NAD, Comfortable  HEENT - NCAT, EOMI, PERRLA, Eye patch is on  Neck - Supple, No limited ROM  Pulm - CTAB, No crackles  Cardiovascular - RRR, S1S2   Abdomen - Soft, NT, ND   Extremities - No C/C/E, No calf tenderness  Neuro- AAOx 4, follows command. Mild dysarthria, Mild right sided facial droop, diplopia present, visual fields intact, left eye medially directed but EOM are intact, right hemiparesis. Decreased to light touch of bilateral forehead, right cheek, RUE and RLE--> Improving   Psychiatric - Mood stable, Affect WNL  Skin:  1x1 stage 2 pressure wound of the right buttock                               HPI:  Michell Neves is a 74 year-old female PMHx of HTN and Raynaud's phenomenon who presented to [facility] ED on 4/9 for dizziness, nausea, and vomiting followed by facial droop and right sided weakness w/ diplopia. Patient found to have SBP over 200mmHg and started on cardene gtt. CTH w/o contrast demonstrated acute pontine hemorrhage and was transferred to [facility] for neurosurgical evaluation. No surgical intervention required. Patient admitted to NSICU and was monitored and treated with antihypertensives. Patient with initial dysphagia but MBS performed and cleared for regular diet w/ thin fluids. Course complicated by urinary retention. TOV initiated on 4/16 and patient started on flomax for a course of at least 3 weeks. Chest pain earlier in admission but workup negative for cardiac original likely related to hypertensive emergency. Leukocytosis now resolved. CXR performed with small bilateral pleural effusions and atelectasis not treated with antibiotics. Patient seen by PT/OT and physiatry recommending acute inpatient rehabilitation. Patient discharged to [facility] on 4/18.     Allergies  No Known Allergies    Subjective:  - Patient was seen and examined at bedside, NAD  - Sleeping well at night, no events  - No pain  - BP is better after D/C HCTZ  - (+) Diplopia and dizziness   - LBM (04/24), voiding without issues. Continent x 2    - Tolerating therapy, motivated, reduced ST to 30 min, PT 90 min and OT 60 min   - Her goal is to get better with her walking and vision, go home, prefers breakfast prior to therapy   - (+) Stage II, dysarthria, memory deficits   - Pressure injury and stroke education provided  - TDD (05/02) home     ROS: - Denies CP, palpitation, SOB, cough, fever, chills, headache, abdominal pain, N/V/D/C, dysuria, hematuria, joint pain or swelling. (+) dizziness, visual changes      MEDICATIONS  (STANDING):  enoxaparin Injectable 30 milliGRAM(s) SubCutaneous every 24 hours  hydrochlorothiazide 12.5 milliGRAM(s) Oral daily  lisinopril 10 milliGRAM(s) Oral two times a day  nystatin    Suspension 915115 Unit(s) Oral four times a day  senna 2 Tablet(s) Oral at bedtime  tamsulosin 0.4 milliGRAM(s) Oral at bedtime  zinc oxide 40% Paste 1 Application(s) Topical daily    MEDICATIONS  (PRN):  acetaminophen     Tablet .. 650 milliGRAM(s) Oral every 6 hours PRN Mild Pain (1 - 3)  bisacodyl 5 milliGRAM(s) Oral every 12 hours PRN Constipation  melatonin 3 milliGRAM(s) Oral at bedtime PRN Insomnia        Recent Labs:                        12.0   7.21  )-----------( 307      ( 24 Apr 2025 05:37 )             36.1     04-24    133[L]  |  99  |  22  ----------------------------<  81  4.5   |  26  |  0.64    Ca    9.3      24 Apr 2025 05:37    TPro  6.3  /  Alb  2.8[L]  /  TBili  0.2  /  DBili  x   /  AST  32  /  ALT  51[H]  /  AlkPhos  92  04-24    LIVER FUNCTIONS - ( 24 Apr 2025 05:37 )  Alb: 2.8 g/dL / Pro: 6.3 g/dL / ALK PHOS: 92 U/L / ALT: 51 U/L / AST: 32 U/L / GGT: x             Physical Exam:  Vital Signs Last 24 Hrs  T(C): 36.3 (24 Apr 2025 08:25), Max: 36.5 (23 Apr 2025 20:47)  T(F): 97.4 (24 Apr 2025 08:25), Max: 97.7 (23 Apr 2025 20:47)  HR: 79 (24 Apr 2025 08:25) (67 - 82)  BP: 126/70 (24 Apr 2025 08:25) (109/69 - 137/73)  RR: 17 (24 Apr 2025 08:25) (15 - 17)  SpO2: 96% (24 Apr 2025 08:25) (96% - 98%)  Parameters below as of 24 Apr 2025 08:25  Patient On (Oxygen Delivery Method): room air      Gen - NAD, Comfortable  HEENT - NCAT, EOMI, PERRLA, Eye patch is on  Neck - Supple, No limited ROM  Pulm - CTAB, No crackles  Cardiovascular - RRR, S1S2   Abdomen - Soft, NT, ND   Extremities - No C/C/E, No calf tenderness  Neuro- AAOx 4, follows command. Mild dysarthria, Mild right sided facial droop, diplopia present, visual fields intact, left eye medially directed but EOM are intact, right hemiparesis. Decreased to light touch of bilateral forehead, right cheek, RUE and RLE--> Improving   Psychiatric - Mood stable, Affect WNL  Skin:  1x1 stage 2 pressure wound of the right buttock

## 2025-04-24 NOTE — PROGRESS NOTE ADULT - ASSESSMENT
75y with HTN and Raynaud's phenomenon admitted to [facility] for 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  Hospital Course complicated by urinary retention, leukocytosis, and chest pain. Patient now admitted for a multidisciplinary rehab program.     Acute left dorsal pontine hemorrhage   -goal SBP under 160mmHg  -No PFO present on TTE  -evaluated by neurosurgical team and temporarily managed in NSICU - no surgical intervention required   -approved for DVT chemoprophylaxis by neurosurgery   -requires repeat MRI of the head in 6 weeks and outpatient neurosurgery follow up     Hyponatremia  - Will monitor Na  - Encourage oral intake  - BMP in AM    Transaminitis, improving  - denies abdominal pain  - Limit Tylenol use  - Monitor    HTN  - goal BP under 160mmHg  -TTE performed: negative for PFO w/ EF 70-75% and mild aortic stenosis -> recommend cardiac follow up outpatient   - lisinopril 10mg daily  - Will d/c HCTZ 12.5 mg daily    Urinary Retention  - Currently patient voids: independently  - Flomax 0.4mg qhs     DVT prophylaxis:  Lovenox

## 2025-04-24 NOTE — PROGRESS NOTE ADULT - NSPROGADDITIONALINFOA_GEN_ALL_CORE
Spent 35 minutes on face-face visit, evaluate, review labs, examine and treat Spent 35 minutes on face-face visit, evaluate, examine and treat, excluding teaching time

## 2025-04-24 NOTE — PROGRESS NOTE ADULT - ASSESSMENT
JANET BUSTILLOS is a 75y with HTN and Raynaud's phenomenon admitted to [facility] for 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  Hospital Course complicated by urinary retention, leukocytosis, and chest pain. Patient now admitted for a multidisciplinary rehab program.     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)    -imaging demonstrated acute left pontine hemorrhage in setting of hypertensive emergency likely due to long term uncontrolled hypertension  -initially managed with cardene gtt and adjusted to antihypertensive outlined below  -goal SBP under 160mmHg  -No PFO present on TTE  -evaluated by neurosurgical team and temporarily managed in NSICU - no surgical intervention required   - Keep bed head at 35 degrees all the times and 90 degrees with food out of bed  -dysphagia reevaluated with MBS and upgraded to regular diet with thin liquids  -approved for DVT chemoprophylaxis by neurosurgery   -Repeat MRI of the head in 6 weeks and outpatient neurosurgery follow up (05/21)   -deficits include right sided hemiparesis, facial droop, dysphagia, dysarthria  -Oral thrush present: nystatin mouthwash ordered 4 times a day  -Oral hygiene x 4 daily prior to Nystatin treatment  -Double vision--> Eye patch     Comprehensive Multidisciplinary Rehab Program:   -Comprehensive rehab program of PT/OT/SLP - 3 hours a day, 5 days a week.   -PT - 90 mins: strengthening, coordination, balance, endurance, gait,  -OT - 60 mins: strengthening, coordination, fine motor, ADLs  -SLP - 30 mins: dysphagia, dysarthria, memory impairment  _______________________________________________________________________________  CONCURRENT MEDICAL PROBLEMS    Transaminitis:  - AST/ALT level (04/19) 76/95, (04/21) 47/86  - Limit Tylenol use  - Monitor  - Hospitalist F/U      HTN  Orthostatic  - goal BP under 160mmHg  -TTE performed: negative for PFO w/ EF 70-75% and mild aortic stenosis -> recommend cardiac follow up outpatient   - lisinopril 20mg daily--> changed to 10 mg daily (04/22)  - HCTZ 25mg daily--> changed to 12.5 mg daily (04/22)--> Refused to take it this am    - Abdominal binder, teds   - Monitor /70 - 131/75--> (04/23)   - Hospitalist to manage meds     Pain  - Tylenol PRN  - Avoid sedating medications that may interfere with cognitive recovery    Sleep  - Maintain quiet hours and a low stim environment.   - Melatonin PRN     GI / Bowel  - Senna qHS--> D/C  - Miralax Daily--> Changed to PRN (04/20)   - bisacodyl prn q12h 5mg po--> D/C   - monitor for loose stools     / Bladder  Urinary Retention  - Currently patient voids: independently  - Bladder scans Q8 hours with straight cath for >400cc. Completed   - Required holden placement previously and TOV started 4/16  - Flomax 0.4mg qhs     Skin / Pressure injury  - Turn q2 hours in bed while awake, air mattress  - nursing to monitor skin qShift  - soft heel protectors  - skin barrier cream for stage 2 pressure wound right buttock 1x1cm daily    Diet/Dysphagia:  - Diet Consistency: DASH diet, regular consistency and thin liquids   - Supplements: B12 1000mcg daily, Vit D 25mcg daily  - Aspiration Precautions  - SLP treatment on going (30 minutes)    - Nutrition consult    DVT prophylaxis:   - Lovenox 30mg subq daily  - Last Doppler BLE on 4/10 negative for DVTs      Outpatient Follow-up:  Logan Farmerul  Neurosurgery  [address] [place] [id number]  Phone: [phone]  Fax: [phone]  Follow Up Time: 1    JANET BUSTILLOS is a 75y with HTN and Raynaud's phenomenon admitted to [facility] for 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  Hospital Course complicated by urinary retention, leukocytosis, and chest pain. Patient now admitted for a multidisciplinary rehab program.     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)    -imaging demonstrated acute left pontine hemorrhage in setting of hypertensive emergency likely due to long term uncontrolled hypertension  -initially managed with cardene gtt and adjusted to antihypertensive outlined below  -goal SBP under 160mmHg  -No PFO present on TTE  -evaluated by neurosurgical team and temporarily managed in NSICU - no surgical intervention required   - Keep bed head at 35 degrees all the times and 90 degrees with food out of bed  -dysphagia reevaluated with MBS and upgraded to regular diet with thin liquids  -approved for DVT chemoprophylaxis by neurosurgery   -Repeat MRI of the head in 6 weeks and outpatient neurosurgery follow up (05/21)   -deficits include right sided hemiparesis, facial droop, dysphagia, dysarthria  -Oral thrush present: nystatin mouthwash ordered 4 times a day  -Oral hygiene x 4 daily prior to Nystatin treatment  -Double vision--> Eye patch     Comprehensive Multidisciplinary Rehab Program:   -Comprehensive rehab program of PT/OT/SLP - 3 hours a day, 5 days a week.   -PT - 90 mins: strengthening, coordination, balance, endurance, gait,  -OT - 60 mins: strengthening, coordination, fine motor, ADLs  -SLP - 30 mins: dysphagia, dysarthria, memory impairment  _______________________________________________________________________________  CONCURRENT MEDICAL PROBLEMS    Transaminitis:  - AST/ALT level (04/19) 76/95, (04/21) 47/86, (04/24) 32/51  - Limit Tylenol use  - Monitor  - Hospitalist F/U      HTN  Orthostatic/ Improved   - goal BP under 160mmHg  -TTE performed: negative for PFO w/ EF 70-75% and mild aortic stenosis -> recommend cardiac follow up outpatient   - lisinopril 20mg daily--> changed to 10 mg daily (04/22)  - HCTZ 25mg daily--> changed to 12.5 mg daily (04/22)--> D/C (04/23)   - Abdominal binderjerel   - Hospitalist to manage meds     Pain  - Tylenol PRN  - Avoid sedating medications that may interfere with cognitive recovery    Sleep  - Maintain quiet hours and a low stim environment.   - Melatonin PRN     GI / Bowel  - Senna qHS--> D/C  - Miralax Daily--> Changed to PRN (04/20)   - bisacodyl prn q12h 5mg po--> D/C   - monitor for loose stools     / Bladder  Urinary Retention  - Currently patient voids: independently  - Bladder scans Q8 hours with straight cath for >400cc. Completed   - Required holden placement previously and TOV started 4/16  - Flomax 0.4mg qhs     Skin / Pressure injury  - Turn q2 hours in bed while awake, air mattress  - nursing to monitor skin qShift  - soft heel protectors  - skin barrier cream for stage 2 pressure wound right buttock 1x1cm daily    Diet/Dysphagia:  - Diet Consistency: DASH diet, regular consistency and thin liquids   - Supplements: B12 1000mcg daily, Vit D 25mcg daily  - Aspiration Precautions  - SLP treatment on going (30 minutes)    - Nutrition consult    DVT prophylaxis:   - Lovenox 30mg subq daily  - Last Doppler BLE on 4/10 negative for DVTs      Outpatient Follow-up:  Logan Farmerul  Neurosurgery  [address] [place] [id number]  Phone: [phone]  Fax: [phone]  Follow Up Time: 1

## 2025-04-24 NOTE — PROGRESS NOTE ADULT - SUBJECTIVE AND OBJECTIVE BOX
Patient is a 75y old  Female who presents with a chief complaint of Acute pontine intracranial hemorrhage     Patient seen and examined at bedside.    ALLERGIES:  No Known Allergies    MEDICATIONS  (STANDING):  enoxaparin Injectable 30 milliGRAM(s) SubCutaneous every 24 hours  lisinopril 10 milliGRAM(s) Oral two times a day  nystatin    Suspension 609746 Unit(s) Oral four times a day  tamsulosin 0.4 milliGRAM(s) Oral at bedtime  zinc oxide 40% Paste 1 Application(s) Topical daily    MEDICATIONS  (PRN):  acetaminophen     Tablet .. 650 milliGRAM(s) Oral every 6 hours PRN Mild Pain (1 - 3)  melatonin 3 milliGRAM(s) Oral at bedtime PRN Insomnia    Vital Signs Last 24 Hrs  T(F): 97.4 (24 Apr 2025 08:25), Max: 97.7 (23 Apr 2025 20:47)  HR: 79 (24 Apr 2025 08:25) (67 - 82)  BP: 126/70 (24 Apr 2025 08:25) (109/69 - 137/73)  RR: 17 (24 Apr 2025 08:25) (15 - 17)  SpO2: 96% (24 Apr 2025 08:25) (96% - 98%)  I&O's Summary      PHYSICAL EXAM  General: NAD, A/O   \ENT: MMM, no scleral icterus  Neck: Supple, No JVD, no thyroidomegaly  Lungs: Clear to auscultation bilaterally, no wheezes, no rales, no rhonchi, good inspiratory effort  Cardio: RRR, S1/S2, No murmurs  Abdomen: Soft, Nontender, Nondistended; Bowel sounds present  Extremities: No calf tenderness, No pitting edema, no skin changes    LABS:                        12.0   7.21  )-----------( 307      ( 24 Apr 2025 05:37 )             36.1       04-24    133  |  99  |  22  ----------------------------<  81  4.5   |  26  |  0.64    Ca    9.3      24 Apr 2025 05:37    TPro  6.3  /  Alb  2.8  /  TBili  0.2  /  DBili  x   /  AST  32  /  ALT  51  /  AlkPhos  92  04-24     04-09 Chol 211 mg/dL LDL -- HDL 95 mg/dL Trig 51 mg/dL    Urinalysis Basic - ( 24 Apr 2025 05:37 )    Color: x / Appearance: x / SG: x / pH: x  Gluc: 81 mg/dL / Ketone: x  / Bili: x / Urobili: x   Blood: x / Protein: x / Nitrite: x   Leuk Esterase: x / RBC: x / WBC x   Sq Epi: x / Non Sq Epi: x / Bacteria: x    COVID-19 PCR: NotDetec (04-18-25 @ 21:00)  COVID-19 PCR: NotDetec (04-18-25 @ 21:00)

## 2025-04-25 LAB
ANION GAP SERPL CALC-SCNC: 5 MMOL/L — SIGNIFICANT CHANGE UP (ref 5–17)
BUN SERPL-MCNC: 22 MG/DL — SIGNIFICANT CHANGE UP (ref 7–23)
CALCIUM SERPL-MCNC: 9 MG/DL — SIGNIFICANT CHANGE UP (ref 8.4–10.5)
CHLORIDE SERPL-SCNC: 102 MMOL/L — SIGNIFICANT CHANGE UP (ref 96–108)
CO2 SERPL-SCNC: 29 MMOL/L — SIGNIFICANT CHANGE UP (ref 22–31)
CREAT SERPL-MCNC: 0.72 MG/DL — SIGNIFICANT CHANGE UP (ref 0.5–1.3)
EGFR: 86 ML/MIN/1.73M2 — SIGNIFICANT CHANGE UP
EGFR: 86 ML/MIN/1.73M2 — SIGNIFICANT CHANGE UP
GLUCOSE SERPL-MCNC: 89 MG/DL — SIGNIFICANT CHANGE UP (ref 70–99)
POTASSIUM SERPL-MCNC: 4.3 MMOL/L — SIGNIFICANT CHANGE UP (ref 3.5–5.3)
POTASSIUM SERPL-SCNC: 4.3 MMOL/L — SIGNIFICANT CHANGE UP (ref 3.5–5.3)
SODIUM SERPL-SCNC: 136 MMOL/L — SIGNIFICANT CHANGE UP (ref 135–145)

## 2025-04-25 PROCEDURE — 99233 SBSQ HOSP IP/OBS HIGH 50: CPT

## 2025-04-25 PROCEDURE — 99232 SBSQ HOSP IP/OBS MODERATE 35: CPT

## 2025-04-25 RX ADMIN — LISINOPRIL 10 MILLIGRAM(S): 5 TABLET ORAL at 17:24

## 2025-04-25 RX ADMIN — ENOXAPARIN SODIUM 30 MILLIGRAM(S): 100 INJECTION SUBCUTANEOUS at 05:32

## 2025-04-25 RX ADMIN — Medication 500000 UNIT(S): at 17:26

## 2025-04-25 RX ADMIN — Medication 500000 UNIT(S): at 12:05

## 2025-04-25 RX ADMIN — TOUCHLESS CARE ZINC OXIDE PROTECTANT 1 APPLICATION(S): 20; 25 SPRAY TOPICAL at 05:33

## 2025-04-25 RX ADMIN — LISINOPRIL 10 MILLIGRAM(S): 5 TABLET ORAL at 05:31

## 2025-04-25 RX ADMIN — Medication 500000 UNIT(S): at 05:32

## 2025-04-25 NOTE — PROGRESS NOTE ADULT - ASSESSMENT
75y with HTN and Raynaud's phenomenon admitted to [facility] for 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  Hospital Course complicated by urinary retention, leukocytosis, and chest pain. Patient now admitted for a multidisciplinary rehab program.     Acute left dorsal pontine hemorrhage   -goal SBP under 160mmHg  -No PFO present on TTE  -evaluated by neurosurgical team and temporarily managed in NSICU - no surgical intervention required   -approved for DVT chemoprophylaxis by neurosurgery   -requires repeat MRI of the head in 6 weeks and outpatient neurosurgery follow up     Hyponatremia:  Resolved     Transaminitis, improving  - denies abdominal pain  - Limit Tylenol use  - Monitor    HTN  - goal BP under 160mmHg  -TTE performed: negative for PFO w/ EF 70-75% and mild aortic stenosis -> recommend cardiac follow up outpatient   - lisinopril 10mg daily  - HCTZ 12.5 mg daily - d/mercedes     Urinary Retention  - Currently patient voids: independently    DVT prophylaxis:  Lovenox

## 2025-04-25 NOTE — PROGRESS NOTE ADULT - SUBJECTIVE AND OBJECTIVE BOX
HPI:  Michell Neves is a 74 year-old female PMHx of HTN and Raynaud's phenomenon who presented to [facility] ED on 4/9 for dizziness, nausea, and vomiting followed by facial droop and right sided weakness w/ diplopia. Patient found to have SBP over 200mmHg and started on cardene gtt. 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 No surgical intervention required. Patient admitted to NSICU and was monitored and treated with antihypertensives. Patient with initial dysphagia but MBS performed and cleared for regular diet w/ thin fluids. Course complicated by urinary retention. TOV initiated on 4/16 and patient started on flomax for a course of at least 3 weeks. Chest pain earlier in admission but workup negative for cardiac original likely related to hypertensive emergency. Leukocytosis now resolved. CXR performed with small bilateral pleural effusions and atelectasis not treated with antibiotics. Patient seen by PT/OT and physiatry recommending acute inpatient rehabilitation. Patient discharged to [facility] on 4/18.     Allergies  No Known Allergies    Subjective:  - Patient was seen and examined at bedside, NAD  - Sleeping well at night, no events  - No pain  - BP is better after D/C HCTZ  - (+) Diplopia and dizziness   - LBM (04/24), voiding without issues. Continent x 2    - Tolerating therapy, motivated, reduced ST to 30 min, PT 90 min and OT 60 min   - Her goal is to get better with her walking and vision, go home, prefers breakfast prior to therapy   - (+) Stage II, dysarthria, memory deficits   - Pressure injury and stroke education provided  - TDD (05/02) home     ROS: - Denies CP, palpitation, SOB, cough, fever, chills, headache, abdominal pain, N/V/D/C, dysuria, hematuria, joint pain or swelling. (+) dizziness, visual changes      MEDICATIONS  (STANDING):  enoxaparin Injectable 30 milliGRAM(s) SubCutaneous every 24 hours  lisinopril 10 milliGRAM(s) Oral two times a day  nystatin    Suspension 071353 Unit(s) Oral four times a day  tamsulosin 0.4 milliGRAM(s) Oral at bedtime  zinc oxide 40% Paste 1 Application(s) Topical daily    MEDICATIONS  (PRN):  acetaminophen     Tablet .. 650 milliGRAM(s) Oral every 6 hours PRN Mild Pain (1 - 3)  melatonin 3 milliGRAM(s) Oral at bedtime PRN Insomnia        Recent Labs:   04-25    136  |  102  |  22  ----------------------------<  89  4.3   |  29  |  0.72    Ca    9.0      25 Apr 2025 05:36    TPro  6.3  /  Alb  2.8[L]  /  TBili  0.2  /  DBili  x   /  AST  32  /  ALT  51[H]  /  AlkPhos  92  04-24                       12.0   7.21  )-----------( 307      ( 24 Apr 2025 05:37 )             36.1     04-24    133[L]  |  99  |  22  ----------------------------<  81  4.5   |  26  |  0.64    Ca    9.3      24 Apr 2025 05:37    TPro  6.3  /  Alb  2.8[L]  /  TBili  0.2  /  DBili  x   /  AST  32  /  ALT  51[H]  /  AlkPhos  92  04-24    LIVER FUNCTIONS - ( 24 Apr 2025 05:37 )  Alb: 2.8 g/dL / Pro: 6.3 g/dL / ALK PHOS: 92 U/L / ALT: 51 U/L / AST: 32 U/L / GGT: x             Physical Exam:  Vital Signs Last 24 Hrs  T(C): 36.5 (25 Apr 2025 08:28), Max: 36.6 (24 Apr 2025 20:35)  T(F): 97.7 (25 Apr 2025 08:28), Max: 97.8 (24 Apr 2025 20:35)  HR: 70 (25 Apr 2025 08:28) (70 - 77)  BP: 114/76 (25 Apr 2025 08:28) (114/76 - 148/77)  BP(mean): --  RR: 14 (25 Apr 2025 08:28) (14 - 16)  SpO2: 97% (25 Apr 2025 08:28) (97% - 98%)  Parameters below as of 25 Apr 2025 08:28  Patient On (Oxygen Delivery Method): room air    Gen - NAD, Comfortable  HEENT - NCAT, EOMI, PERRLA, Eye patch is on  Neck - Supple, No limited ROM  Pulm - CTAB, No crackles  Cardiovascular - RRR, S1S2   Abdomen - Soft, NT, ND   Extremities - No C/C/E, No calf tenderness  Neuro- AAOx 4, follows command. Mild dysarthria, Mild right sided facial droop, diplopia present, visual fields intact, left eye medially directed but EOM are intact, right hemiparesis. Decreased to light touch of bilateral forehead, right cheek, RUE and RLE--> Improving   Psychiatric - Mood stable, Affect WNL  Skin:  1x1 stage 2 pressure wound of the right buttock                               HPI:  Michell Neves is a 74 year-old female PMHx of HTN and Raynaud's phenomenon who presented to [facility] ED on 4/9 for dizziness, nausea, and vomiting followed by facial droop and right sided weakness w/ diplopia. Patient found to have SBP over 200mmHg and started on cardene gtt. CTH w/o contrast demonstrated acute pontine hemorrhage and was transferred to [facility] for neurosurgical evaluation. No surgical intervention required. Patient admitted to NSICU and was monitored and treated with antihypertensives. Patient with initial dysphagia but MBS performed and cleared for regular diet w/ thin fluids. Course complicated by urinary retention. TOV initiated on 4/16 and patient started on flomax for a course of at least 3 weeks. Chest pain earlier in admission but workup negative for cardiac original likely related to hypertensive emergency. Leukocytosis now resolved. CXR performed with small bilateral pleural effusions and atelectasis not treated with antibiotics. Patient seen by PT/OT and physiatry recommending acute inpatient rehabilitation. Patient discharged to [facility] on 4/18.     Allergies  No Known Allergies    Subjective:  - Patient was seen and examined at during PT, NAD  - Sleeping well at night, no events  - No pain  - Na wnl today after encouraged po fluids  - (+) Diplopia and dizziness, improving per patient today  - LBM (04/24), voiding without issues. Continent x 2    - Tolerating therapy, motivated, reduced ST to 30 min, PT 90 min and OT 60 min   - Her goal is to get better with her walking and vision, go home, prefers breakfast prior to therapy   - (+) Stage II, dysarthria, memory deficits   - Pressure injury and stroke education provided  - TDD (05/02) home     ROS: - Denies CP, palpitation, SOB, cough, fever, chills, headache, abdominal pain, N/V/D/C, dysuria, hematuria, joint pain or swelling. (+) dizziness, visual changes      MEDICATIONS  (STANDING):  enoxaparin Injectable 30 milliGRAM(s) SubCutaneous every 24 hours  lisinopril 10 milliGRAM(s) Oral two times a day  nystatin    Suspension 587994 Unit(s) Oral four times a day  tamsulosin 0.4 milliGRAM(s) Oral at bedtime  zinc oxide 40% Paste 1 Application(s) Topical daily    MEDICATIONS  (PRN):  acetaminophen     Tablet .. 650 milliGRAM(s) Oral every 6 hours PRN Mild Pain (1 - 3)  melatonin 3 milliGRAM(s) Oral at bedtime PRN Insomnia          Recent Labs:   04-25    136  |  102  |  22  ----------------------------<  89  4.3   |  29  |  0.72    Ca    9.0      25 Apr 2025 05:36    TPro  6.3  /  Alb  2.8[L]  /  TBili  0.2  /  DBili  x   /  AST  32  /  ALT  51[H]  /  AlkPhos  92  04-24                       12.0   7.21  )-----------( 307      ( 24 Apr 2025 05:37 )             36.1     04-24    133[L]  |  99  |  22  ----------------------------<  81  4.5   |  26  |  0.64    Ca    9.3      24 Apr 2025 05:37    TPro  6.3  /  Alb  2.8[L]  /  TBili  0.2  /  DBili  x   /  AST  32  /  ALT  51[H]  /  AlkPhos  92  04-24    LIVER FUNCTIONS - ( 24 Apr 2025 05:37 )  Alb: 2.8 g/dL / Pro: 6.3 g/dL / ALK PHOS: 92 U/L / ALT: 51 U/L / AST: 32 U/L / GGT: x             Physical Exam:  Vital Signs Last 24 Hrs  T(C): 36.5 (25 Apr 2025 08:28), Max: 36.6 (24 Apr 2025 20:35)  T(F): 97.7 (25 Apr 2025 08:28), Max: 97.8 (24 Apr 2025 20:35)  HR: 70 (25 Apr 2025 08:28) (70 - 77)  BP: 114/76 (25 Apr 2025 08:28) (114/76 - 148/77)  BP(mean): --  RR: 14 (25 Apr 2025 08:28) (14 - 16)  SpO2: 97% (25 Apr 2025 08:28) (97% - 98%)  Parameters below as of 25 Apr 2025 08:28  Patient On (Oxygen Delivery Method): room air    Gen - NAD, Comfortable  HEENT - NCAT, EOMI, PERRLA, Eye patch is on  Neck - Supple, No limited ROM  Pulm - CTAB, No crackles  Cardiovascular - RRR, S1S2   Abdomen - Soft, NT, ND   Extremities - No C/C/E, No calf tenderness  Neuro- AAOx 4, follows command. Mild dysarthria, Mild right sided facial droop, diplopia present, visual fields intact, left eye medially directed but EOM are intact, right hemiparesis. Decreased to light touch of bilateral forehead, right cheek, RUE and RLE--> Improving   Psychiatric - Mood stable, Affect WNL  Skin:  1x1 stage 2 pressure wound of the right buttock                               HPI:  Michell Neves is a 74 year-old female PMHx of HTN and Raynaud's phenomenon who presented to [facility] ED on 4/9 for dizziness, nausea, and vomiting followed by facial droop and right sided weakness w/ diplopia. Patient found to have SBP over 200mmHg and started on cardene gtt. CTH w/o contrast demonstrated acute pontine hemorrhage and was transferred to [facility] for neurosurgical evaluation. No surgical intervention required. Patient admitted to NSICU and was monitored and treated with antihypertensives. Patient with initial dysphagia but MBS performed and cleared for regular diet w/ thin fluids. Course complicated by urinary retention. TOV initiated on 4/16 and patient started on flomax for a course of at least 3 weeks. Chest pain earlier in admission but workup negative for cardiac original likely related to hypertensive emergency. Leukocytosis now resolved. CXR performed with small bilateral pleural effusions and atelectasis not treated with antibiotics. Patient seen by PT/OT and physiatry recommending acute inpatient rehabilitation. Patient discharged to [facility] on 4/18.     Allergies  No Known Allergies    Subjective:  - Patient was seen and examined at during PT, NAD  - Sleeping well at night, no events  - No pain  - Na wnl today after encouraged po fluids  - (+) Diplopia and dizziness, improving per patient today  - LBM (04/24), voiding without issues. Continent x 2    - Tolerating therapy, motivated, reduced ST to 30 min, PT 90 min and OT 60 min   - Her goal is to get better with her walking and vision, go home, prefers breakfast prior to therapy   - (+) Stage II, dysarthria, memory deficits   - Pressure injury and stroke education provided  - TDD (05/02) home     ROS: - Denies CP, palpitation, SOB, cough, fever, chills, headache, abdominal pain, N/V/D/C, dysuria, hematuria, joint pain or swelling. (+) dizziness, visual changes      MEDICATIONS  (STANDING):  enoxaparin Injectable 30 milliGRAM(s) SubCutaneous every 24 hours  lisinopril 10 milliGRAM(s) Oral two times a day  nystatin    Suspension 763266 Unit(s) Oral four times a day  tamsulosin 0.4 milliGRAM(s) Oral at bedtime  zinc oxide 40% Paste 1 Application(s) Topical daily    MEDICATIONS  (PRN):  acetaminophen     Tablet .. 650 milliGRAM(s) Oral every 6 hours PRN Mild Pain (1 - 3)  melatonin 3 milliGRAM(s) Oral at bedtime PRN Insomnia          Recent Labs:   04-25    136  |  102  |  22  ----------------------------<  89  4.3   |  29  |  0.72    Ca    9.0      25 Apr 2025 05:36    TPro  6.3  /  Alb  2.8[L]  /  TBili  0.2  /  DBili  x   /  AST  32  /  ALT  51[H]  /  AlkPhos  92  04-24                       12.0   7.21  )-----------( 307      ( 24 Apr 2025 05:37 )             36.1     04-24    133[L]  |  99  |  22  ----------------------------<  81  4.5   |  26  |  0.64    Ca    9.3      24 Apr 2025 05:37    TPro  6.3  /  Alb  2.8[L]  /  TBili  0.2  /  DBili  x   /  AST  32  /  ALT  51[H]  /  AlkPhos  92  04-24    LIVER FUNCTIONS - ( 24 Apr 2025 05:37 )  Alb: 2.8 g/dL / Pro: 6.3 g/dL / ALK PHOS: 92 U/L / ALT: 51 U/L / AST: 32 U/L / GGT: x             Physical Exam:  Vital Signs Last 24 Hrs  T(C): 36.5 (25 Apr 2025 08:28), Max: 36.6 (24 Apr 2025 20:35)  T(F): 97.7 (25 Apr 2025 08:28), Max: 97.8 (24 Apr 2025 20:35)  HR: 70 (25 Apr 2025 08:28) (70 - 77)  BP: 114/76 (25 Apr 2025 08:28) (114/76 - 148/77)  RR: 14 (25 Apr 2025 08:28) (14 - 16)  SpO2: 97% (25 Apr 2025 08:28) (97% - 98%)  Parameters below as of 25 Apr 2025 08:28  Patient On (Oxygen Delivery Method): room air    Gen - NAD, Comfortable  HEENT - NCAT, EOMI, PERRLA, Eye patch is on  Neck - Supple, No limited ROM  Pulm - CTAB, No crackles  Cardiovascular - RRR, S1S2   Abdomen - Soft, NT, ND   Extremities - No C/C/E, No calf tenderness  Neuro- AAOx 4, follows command. Mild dysarthria, Mild right sided facial droop, diplopia present, visual fields intact, left eye medially directed but EOM are intact, right hemiparesis. Decreased to light touch of bilateral forehead, right cheek, RUE and RLE--> Improving   Psychiatric - Mood stable, Affect WNL  Skin:  1x1 stage 2 pressure wound of the right buttock

## 2025-04-25 NOTE — PROGRESS NOTE ADULT - ASSESSMENT
JANET BUSTILLOS is a 75y with HTN and Raynaud's phenomenon admitted to [facility] for 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  Hospital Course complicated by urinary retention, leukocytosis, and chest pain. Patient now admitted for a multidisciplinary rehab program.     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)    -imaging demonstrated acute left pontine hemorrhage in setting of hypertensive emergency likely due to long term uncontrolled hypertension  -initially managed with cardene gtt and adjusted to antihypertensive outlined below  -goal SBP under 160mmHg  -No PFO present on TTE  -evaluated by neurosurgical team and temporarily managed in NSICU - no surgical intervention required   - Keep bed head at 35 degrees all the times and 90 degrees with food out of bed  -dysphagia reevaluated with MBS and upgraded to regular diet with thin liquids  -approved for DVT chemoprophylaxis by neurosurgery   -Repeat MRI of the head in 6 weeks and outpatient neurosurgery follow up (05/21)   -deficits include right sided hemiparesis, facial droop, dysphagia, dysarthria  -Oral thrush present: nystatin mouthwash ordered 4 times a day  -Oral hygiene x 4 daily prior to Nystatin treatment  -Double vision--> Eye patch     Comprehensive Multidisciplinary Rehab Program:   -Comprehensive rehab program of PT/OT/SLP - 3 hours a day, 5 days a week.   -PT - 90 mins: strengthening, coordination, balance, endurance, gait,  -OT - 60 mins: strengthening, coordination, fine motor, ADLs  -SLP - 30 mins: dysphagia, dysarthria, memory impairment  _______________________________________________________________________________  CONCURRENT MEDICAL PROBLEMS    Transaminitis:  - AST/ALT level (04/19) 76/95, (04/21) 47/86, (04/24) 32/51  - Limit Tylenol use  - Monitor  - Hospitalist F/U      HTN  Orthostatic/ Improved   - goal BP under 160mmHg  -TTE performed: negative for PFO w/ EF 70-75% and mild aortic stenosis -> recommend cardiac follow up outpatient   - lisinopril 20mg daily--> changed to 10 mg daily (04/22)  - HCTZ 25mg daily--> changed to 12.5 mg daily (04/22)--> D/C (04/23)   - Abdominal binderjerel   - Hospitalist to manage meds     Pain  - Tylenol PRN  - Avoid sedating medications that may interfere with cognitive recovery    Sleep  - Maintain quiet hours and a low stim environment.   - Melatonin PRN     GI / Bowel  - Senna qHS--> D/C  - Miralax Daily--> Changed to PRN (04/20)   - bisacodyl prn q12h 5mg po--> D/C   - monitor for loose stools     / Bladder  Urinary Retention  - Currently patient voids: independently  - Bladder scans Q8 hours with straight cath for >400cc. Completed   - Required holden placement previously and TOV started 4/16  - Flomax 0.4mg qhs     Skin / Pressure injury  - Turn q2 hours in bed while awake, air mattress  - nursing to monitor skin qShift  - soft heel protectors  - skin barrier cream for stage 2 pressure wound right buttock 1x1cm daily    Diet/Dysphagia:  - Diet Consistency: DASH diet, regular consistency and thin liquids   - Supplements: B12 1000mcg daily, Vit D 25mcg daily  - Aspiration Precautions  - SLP treatment on going (30 minutes)    - Nutrition consult    DVT prophylaxis:   - Lovenox 30mg subq daily  - Last Doppler BLE on 4/10 negative for DVTs      Outpatient Follow-up:  Logan Farmerul  Neurosurgery  [address] [place] [id number]  Phone: [phone]  Fax: [phone]  Follow Up Time: 1    JANET BUSTILLOS is a 75y with HTN and Raynaud's phenomenon admitted to [facility] for 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  Hospital Course complicated by urinary retention, leukocytosis, and chest pain. Patient now admitted for a multidisciplinary rehab program.     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)    -imaging demonstrated acute left pontine hemorrhage in setting of hypertensive emergency likely due to long term uncontrolled hypertension  -initially managed with cardene gtt and adjusted to antihypertensive outlined below  -goal SBP under 160mmHg  -No PFO present on TTE  -evaluated by neurosurgical team and temporarily managed in NSICU - no surgical intervention required   - Keep bed head at 35 degrees all the times and 90 degrees with food out of bed  -dysphagia reevaluated with MBS and upgraded to regular diet with thin liquids  -approved for DVT chemoprophylaxis by neurosurgery   -Repeat MRI of the head in 6 weeks and outpatient neurosurgery follow up (05/21)   -deficits include right sided hemiparesis, facial droop, dysphagia, dysarthria  -Oral thrush present: nystatin mouthwash ordered 4 times a day  -Oral hygiene x 4 daily prior to Nystatin treatment  -Double vision--> Eye patch     Comprehensive Multidisciplinary Rehab Program:   -Comprehensive rehab program of PT/OT/SLP - 3 hours a day, 5 days a week.   -PT - 90 mins: strengthening, coordination, balance, endurance, gait,  -OT - 60 mins: strengthening, coordination, fine motor, ADLs  -SLP - 30 mins: dysphagia, dysarthria, memory impairment  _______________________________________________________________________________    CONCURRENT MEDICAL PROBLEMS    Transaminitis:  - AST/ALT level (04/19) 76/95, (04/21) 47/86, (04/24) 32/51  - Limit Tylenol use  - Monitor  - Hospitalist F/U      HTN  Orthostatic/ Improved   - goal BP under 160mmHg  -TTE performed: negative for PFO w/ EF 70-75% and mild aortic stenosis -> recommend cardiac follow up outpatient   - lisinopril 20mg daily--> changed to 10 mg daily (04/22)  - HCTZ 25mg daily--> changed to 12.5 mg daily (04/22)--> D/C (04/23)   - Abdominal binderjerel   - Hospitalist to manage meds     Pain  - Tylenol PRN  - Avoid sedating medications that may interfere with cognitive recovery    Sleep  - Maintain quiet hours and a low stim environment.   - Melatonin PRN     GI / Bowel  - Senna qHS--> D/C  - Miralax Daily--> Changed to PRN (04/20)   - bisacodyl prn q12h 5mg po--> D/C   - monitor for loose stools     / Bladder  Urinary Retention  - Currently patient voids: independently  - Bladder scans Q8 hours with straight cath for >400cc. Completed   - Required holden placement previously and TOV started 4/16  - Flomax 0.4mg qhs     Skin / Pressure injury  - Turn q2 hours in bed while awake, air mattress  - nursing to monitor skin qShift  - soft heel protectors  - skin barrier cream for stage 2 pressure wound right buttock 1x1cm daily    Diet/Dysphagia:  - Diet Consistency: DASH diet, regular consistency and thin liquids   - Supplements: B12 1000mcg daily, Vit D 25mcg daily  - Aspiration Precautions  - SLP treatment on going (30 minutes)    - Nutrition consult    DVT prophylaxis:   - Lovenox 30mg subq daily  - Last Doppler BLE on 4/10 negative for DVTs      Outpatient Follow-up:  Logan Farmerul  Neurosurgery  [address] [place] [id number]  Phone: [phone]  Fax: [phone]  Follow Up Time: 1

## 2025-04-25 NOTE — PROGRESS NOTE ADULT - SUBJECTIVE AND OBJECTIVE BOX
CC: Patient is a 75y old  Female who presents with a chief complaint of Acute pontine intracranial hemorrhage.      Interval History:  Patient seen and examined at bedside.  No overnight events  No complaints this morning    ALLERGIES:  No Known Allergies    MEDICATIONS  (STANDING):  enoxaparin Injectable 30 milliGRAM(s) SubCutaneous every 24 hours  lisinopril 10 milliGRAM(s) Oral two times a day  nystatin    Suspension [id number] Unit(s) Oral four times a day  zinc oxide 40% Paste 1 Application(s) Topical daily    MEDICATIONS  (PRN):  acetaminophen     Tablet .. 650 milliGRAM(s) Oral every 6 hours PRN Mild Pain (1 - 3)  melatonin 3 milliGRAM(s) Oral at bedtime PRN Insomnia    Vital Signs Last 24 Hrs  T(F): 97.7 (25 Apr 2025 08:28), Max: 97.8 (24 Apr 2025 20:35)  HR: 70 (25 Apr 2025 08:28) (70 - 77)  BP: 114/76 (25 Apr 2025 08:28) (114/76 - 148/77)  RR: 14 (25 Apr 2025 08:28) (14 - 16)  SpO2: 97% (25 Apr 2025 08:28) (97% - 98%)  I&O's Summary        PHYSICAL EXAM:  GENERAL: NAD  NERVOUS SYSTEM:  Awake, alert  CHEST/LUNG: Clear to percussion bilaterally; No rales, rhonchi, wheezing, or rubs; normal respiratory effort, no intercostal retractions  HEART: Regular rate and rhythm; No murmurs, rubs, or gallops; No pitting edema  ABDOMEN: Soft, Nontender, Nondistended; Bowel sounds present; No HSM or masses  MUSCULOSKELETAL/EXTREMITIES:  2+ Peripheral Pulses, No clubbing or digital cyanosis    LABS:                        12.0   7.21  )-----------( 307      ( 24 Apr 2025 05:37 )             36.1       04-25    136  |  102  |  22  ----------------------------<  89  4.3   |  29  |  0.72    Ca    9.0      25 Apr 2025 05:36    TPro  6.3  /  Alb  2.8  /  TBili  0.2  /  DBili  x   /  AST  32  /  ALT  51  /  AlkPhos  92  04-24                04-09 Chol 211 mg/dL LDL -- HDL 95 mg/dL Trig 51 mg/dL                  Urinalysis Basic - ( 25 Apr 2025 05:36 )    Color: x / Appearance: x / SG: x / pH: x  Gluc: 89 mg/dL / Ketone: x  / Bili: x / Urobili: x   Blood: x / Protein: x / Nitrite: x   Leuk Esterase: x / RBC: x / WBC x   Sq Epi: x / Non Sq Epi: x / Bacteria: x        COVID-19 PCR: NotDetec (04-18-25 @ 21:00)      Care Discussed with Consultants/Other Providers: Yes

## 2025-04-26 PROCEDURE — 99232 SBSQ HOSP IP/OBS MODERATE 35: CPT

## 2025-04-26 PROCEDURE — 99233 SBSQ HOSP IP/OBS HIGH 50: CPT

## 2025-04-26 RX ADMIN — Medication 500000 UNIT(S): at 11:12

## 2025-04-26 RX ADMIN — Medication 500000 UNIT(S): at 17:34

## 2025-04-26 RX ADMIN — LISINOPRIL 10 MILLIGRAM(S): 5 TABLET ORAL at 17:33

## 2025-04-26 RX ADMIN — ENOXAPARIN SODIUM 30 MILLIGRAM(S): 100 INJECTION SUBCUTANEOUS at 05:42

## 2025-04-26 RX ADMIN — Medication 500000 UNIT(S): at 22:06

## 2025-04-26 RX ADMIN — TOUCHLESS CARE ZINC OXIDE PROTECTANT 1 APPLICATION(S): 20; 25 SPRAY TOPICAL at 05:42

## 2025-04-26 RX ADMIN — LISINOPRIL 10 MILLIGRAM(S): 5 TABLET ORAL at 05:41

## 2025-04-26 RX ADMIN — Medication 500000 UNIT(S): at 05:41

## 2025-04-26 NOTE — PROGRESS NOTE ADULT - SUBJECTIVE AND OBJECTIVE BOX
Patient is a 75y old  Female who presents with a chief complaint of Acute pontine intracranial hemorrhage (26 Apr 2025 10:10)    Subjective/overnight  seen patient at bedside  Eye patch on left eye  complain of right sided weakness with numbness at times  denied Chest pain/SOB/palpitation/abd pain/n/v/d/c/dysuria/flank pain/headaches/LOC. all other ROS neg     Allergies    No Known Allergies    Intolerances    MEDICATIONS  (STANDING):  enoxaparin Injectable 30 milliGRAM(s) SubCutaneous every 24 hours  lisinopril 10 milliGRAM(s) Oral two times a day  nystatin    Suspension 984317 Unit(s) Oral four times a day  zinc oxide 40% Paste 1 Application(s) Topical daily    MEDICATIONS  (PRN):  acetaminophen     Tablet .. 650 milliGRAM(s) Oral every 6 hours PRN Mild Pain (1 - 3)  melatonin 3 milliGRAM(s) Oral at bedtime PRN Insomnia    Vital Signs Last 24 Hrs  T(C): 36.4 (04-26-25 @ 08:12), Max: 36.7 (04-25-25 @ 19:45)  T(F): 97.6 (04-26-25 @ 08:12), Max: 98.1 (04-25-25 @ 19:45)  HR: 75 (04-26-25 @ 08:12) (71 - 77)  BP: 138/84 (04-26-25 @ 08:12) (131/75 - 147/76)  BP(mean): --  RR: 16 (04-26-25 @ 08:12) (15 - 16)  SpO2: 99% (04-26-25 @ 08:12) (97% - 99%)    T(C): 36.4 (04-26-25 @ 08:12), Max: 36.7 (04-25-25 @ 19:45)  HR: 75 (04-26-25 @ 08:12) (71 - 77)  BP: 138/84 (04-26-25 @ 08:12) (131/75 - 147/76)  RR: 16 (04-26-25 @ 08:12) (15 - 16)  SpO2: 99% (04-26-25 @ 08:12) (97% - 99%)    -CONSTITUTIONAL: Well groomed, no apparent distress  -EYES: Patch to left eye, right eye reactive to light eomi intact  -ENMT: Oral mucosa with moist membranes. Normal dentition  -NECK: Supple, symmetric and without tracheal deviation   -RESP: No respiratory distress, no use of accessory muscles; clear to auscultation no rales/rhonchi  -CV: RRR, +S1S2, no MRG; no JVD; no peripheral edema  -GI: Soft, NT, ND, no rebound, no guarding  -MSK: sitting in wheelchair during exam. able to turn and sit forward without assist No digital clubbing or cyanosis   -SKIN: No rashes or ulcers noted  -NEURO: Right sided weakness/numbness.   -PSYCH: Appropriate insight/judgment; A+O x 3, mood and affect appropriate, recent/remote memory intact    Discussed with dr Hollis Patient is a 75y old  Female who presents with a chief complaint of Acute pontine intracranial hemorrhage (26 Apr 2025 10:10)    Subjective/overnight  seen patient at bedside  Eye patch on left eye  complain of right sided weakness with numbness at times  denied Chest pain/SOB/palpitation/abd pain/n/v/d/c/dysuria/flank pain/headaches/LOC. all other ROS neg     Allergies    No Known Allergies    Intolerances    MEDICATIONS  (STANDING):  enoxaparin Injectable 30 milliGRAM(s) SubCutaneous every 24 hours  lisinopril 10 milliGRAM(s) Oral two times a day  nystatin    Suspension 635525 Unit(s) Oral four times a day  zinc oxide 40% Paste 1 Application(s) Topical daily    MEDICATIONS  (PRN):  acetaminophen     Tablet .. 650 milliGRAM(s) Oral every 6 hours PRN Mild Pain (1 - 3)  melatonin 3 milliGRAM(s) Oral at bedtime PRN Insomnia    Vital Signs Last 24 Hrs  T(C): 36.4 (04-26-25 @ 08:12), Max: 36.7 (04-25-25 @ 19:45)  T(F): 97.6 (04-26-25 @ 08:12), Max: 98.1 (04-25-25 @ 19:45)  HR: 75 (04-26-25 @ 08:12) (71 - 77)  BP: 138/84 (04-26-25 @ 08:12) (131/75 - 147/76)  BP(mean): --  RR: 16 (04-26-25 @ 08:12) (15 - 16)  SpO2: 99% (04-26-25 @ 08:12) (97% - 99%)    T(C): 36.4 (04-26-25 @ 08:12), Max: 36.7 (04-25-25 @ 19:45)  HR: 75 (04-26-25 @ 08:12) (71 - 77)  BP: 138/84 (04-26-25 @ 08:12) (131/75 - 147/76)  RR: 16 (04-26-25 @ 08:12) (15 - 16)  SpO2: 99% (04-26-25 @ 08:12) (97% - 99%)    -CONSTITUTIONAL: Well groomed, no apparent distress  -EYES: Patch to left eye, right eye reactive to light eomi intact  -ENMT: Oral mucosa with moist membranes. Normal dentition  -NECK: Supple, symmetric and without tracheal deviation   -RESP: No respiratory distress, no use of accessory muscles; clear to auscultation no rales/rhonchi  -CV: RRR, +S1S2, no MRG; no JVD; no peripheral edema  -GI: Soft, NT, ND, no rebound, no guarding  -MSK: sitting in wheelchair during exam. able to turn and sit forward without assist No digital clubbing or cyanosis   -SKIN: No rashes or ulcers noted  -NEURO: Right sided weakness/numbness.   -PSYCH: Appropriate insight/judgment; A+O x 3, mood and affect appropriate, recent/remote memory intact

## 2025-04-26 NOTE — PROGRESS NOTE ADULT - TIME BILLING
I, the attending physician, was physically present for the key portions of the evaluation and management (E/M) service provided. The total amount of time spent reviewing the hospital course, laboratory values, imaging findings, assessing/counseling the patient, discussing with consultant physicians, social work, nursing staff was 50 minutes. 50% of the time was spent in counselling and education

## 2025-04-26 NOTE — PROGRESS NOTE ADULT - SUBJECTIVE AND OBJECTIVE BOX
HPI:  Michell Neves is a 74 year-old female PMHx of HTN and Raynaud's phenomenon who presented to [facility] ED on 4/9 for dizziness, nausea, and vomiting followed by facial droop and right sided weakness w/ diplopia. Patient found to have SBP over 200mmHg and started on cardene gtt. CTH w/o contrast demonstrated acute pontine hemorrhage and was transferred to [facility] for neurosurgical evaluation. No surgical intervention required. Patient admitted to NSICU and was monitored and treated with antihypertensives. Patient with initial dysphagia but MBS performed and cleared for regular diet w/ thin fluids. Course complicated by urinary retention. TOV initiated on 4/16 and patient started on flomax for a course of at least 3 weeks. Chest pain earlier in admission but workup negative for cardiac original likely related to hypertensive emergency. Leukocytosis now resolved. CXR performed with small bilateral pleural effusions and atelectasis not treated with antibiotics. Patient seen by PT/OT and physiatry recommending acute inpatient rehabilitation. Patient discharged to [facility] on 4/18.     Allergies  No Known Allergies    Subjective:  - Patient was seen and examined at during PT, NAD  - Sleeping well at night, no events  -   - TDD (05/02) home     ROS: - Denies CP, palpitation, SOB, cough, fever, chills, headache, abdominal pain, N/V/D/C, dysuria, hematuria, joint pain or swelling. (+) dizziness, visual changes      MEDICATIONS  (STANDING):  enoxaparin Injectable 30 milliGRAM(s) SubCutaneous every 24 hours  lisinopril 10 milliGRAM(s) Oral two times a day  nystatin    Suspension 028346 Unit(s) Oral four times a day  zinc oxide 40% Paste 1 Application(s) Topical daily    MEDICATIONS  (PRN):  acetaminophen     Tablet .. 650 milliGRAM(s) Oral every 6 hours PRN Mild Pain (1 - 3)  melatonin 3 milliGRAM(s) Oral at bedtime PRN Insomnia          Recent Labs:   04-25    136  |  102  |  22  ----------------------------<  89  4.3   |  29  |  0.72    Ca    9.0      25 Apr 2025 05:36    TPro  6.3  /  Alb  2.8[L]  /  TBili  0.2  /  DBili  x   /  AST  32  /  ALT  51[H]  /  AlkPhos  92  04-24                       12.0   7.21  )-----------( 307      ( 24 Apr 2025 05:37 )             36.1     04-24    133[L]  |  99  |  22  ----------------------------<  81  4.5   |  26  |  0.64    Ca    9.3      24 Apr 2025 05:37    TPro  6.3  /  Alb  2.8[L]  /  TBili  0.2  /  DBili  x   /  AST  32  /  ALT  51[H]  /  AlkPhos  92  04-24    LIVER FUNCTIONS - ( 24 Apr 2025 05:37 )  Alb: 2.8 g/dL / Pro: 6.3 g/dL / ALK PHOS: 92 U/L / ALT: 51 U/L / AST: 32 U/L / GGT: x             Physical Exam:  Vital Signs Last 24 Hrs  T(C): 36.4 (26 Apr 2025 08:12), Max: 36.7 (25 Apr 2025 19:45)  T(F): 97.6 (26 Apr 2025 08:12), Max: 98.1 (25 Apr 2025 19:45)  HR: 75 (26 Apr 2025 08:12) (71 - 77)  BP: 138/84 (26 Apr 2025 08:12) (131/75 - 147/76)  BP(mean): --  RR: 16 (26 Apr 2025 08:12) (15 - 16)  SpO2: 99% (26 Apr 2025 08:12) (97% - 99%)    Parameters below as of 26 Apr 2025 08:12  Patient On (Oxygen Delivery Method): room air        Gen - NAD, Comfortable  HEENT - NCAT, EOMI, PERRLA, Eye patch is on  Neck - Supple, No limited ROM  Pulm - CTAB, No crackles  Cardiovascular - RRR, S1S2   Abdomen - Soft, NT, ND   Extremities - No C/C/E, No calf tenderness  Neuro- AAOx 4, follows command. Mild dysarthria, Mild right sided facial droop, diplopia present, visual fields intact, left eye medially directed but EOM are intact, right hemiparesis. Decreased to light touch of bilateral forehead, right cheek, RUE and RLE--> Improving   Psychiatric - Mood stable, Affect WNL  Skin:  1x1 stage 2 pressure wound of the right buttock

## 2025-04-26 NOTE — PROGRESS NOTE ADULT - ASSESSMENT
HPI:  Michell Neves is a 74 year-old female PMHx of HTN and Raynaud's phenomenon who presented to [facility] ED on 4/9 for dizziness, nausea, and vomiting followed by facial droop and right sided weakness w/ diplopia. Patient found to have SBP over 200mmHg. CTH w/o contrast demonstrated acute pontine hemorrhage and was transferred to [facility] for neurosurgical evaluation. No surgical intervention required. Course complicated by urinary retention and chest pain after w/u was attributed to the HTN emergency. TOV initiated on 4/16 and patient started on flomax for a course of at least 3 weeks.  Patient seen by PT/OT and physiatry recommending acute inpatient rehabilitation. Patient admitted to [facility] on 4/18.     =Acute left dorsal pontine hemorrhage  -SBP goal < 160  -TTE negative for PFO  -Neurosurg team from NSICU - no surgical intervention at this time  -DVT chemoppx approved by neurosurg  -6week repeat MRI of head and follow up with neurosurg    =Oral thrush  -Nystatin oral swish x10 days stop on april 28th    =Transaminitis   -Trended down AST 47->32 4/25, ALT 86->51 4/25  -Monitor on routine panel  -no pain on abdomen  -Tylenol use limited    =HTN  -BP goal <160 SBP  -EF 70-75% with mild AS  -Cardiac f/u out patient  -Lisinopril BID  -HCTZ dc'd    DVT PX- Lovenox 30mg daily HPI:  Michell Neves is a 74 year-old female PMHx of HTN and Raynaud's phenomenon who presented to [facility] ED on 4/9 for dizziness, nausea, and vomiting followed by facial droop and right sided weakness w/ diplopia. Patient found to have SBP over 200mmHg. CTH w/o contrast demonstrated acute pontine hemorrhage and was transferred to [facility] for neurosurgical evaluation. No surgical intervention required. Course complicated by urinary retention and chest pain after w/u was attributed to the HTN emergency. TOV initiated on 4/16 and patient started on flomax for a course of at least 3 weeks.  Patient seen by PT/OT and physiatry recommending acute inpatient rehabilitation. Patient admitted to [facility] on 4/18.     =Acute left dorsal pontine hemorrhage  -SBP goal < 160  -TTE negative for PFO  -Neurosurg team from NSICU - no surgical intervention at this time  -DVT chemoppx approved by neurosurg  -6week repeat MRI of head and follow up with neurosurg    =Oral thrush  -Nystatin oral swish x10 days stop on April 28th    =Transaminitis   -Trended down AST 47->32 4/25, ALT 86->51 4/25  -Monitor on routine panel  -no pain on abdomen  -Tylenol use limited    =HTN  -BP goal <160 SBP  -EF 70-75% with mild AS  -Cardiac f/u out patient  -Lisinopril BID  -HCTZ dc'd    DVT PX- Lovenox 30mg daily    Discussed with dr Hollis

## 2025-04-26 NOTE — PROGRESS NOTE ADULT - ASSESSMENT
JANET BUSTILLOS is a 75y with HTN and Raynaud's phenomenon admitted to [facility] for 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  Hospital Course complicated by urinary retention, leukocytosis, and chest pain. Patient now admitted for a multidisciplinary rehab program.     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)    -imaging demonstrated acute left pontine hemorrhage in setting of hypertensive emergency likely due to long term uncontrolled hypertension  -initially managed with cardene gtt and adjusted to antihypertensive outlined below  -goal SBP under 160mmHg  -No PFO present on TTE  -evaluated by neurosurgical team and temporarily managed in NSICU - no surgical intervention required   - Keep bed head at 35 degrees all the times and 90 degrees with food out of bed  -dysphagia reevaluated with MBS and upgraded to regular diet with thin liquids  -approved for DVT chemoprophylaxis by neurosurgery   -Repeat MRI of the head in 6 weeks and outpatient neurosurgery follow up (05/21)   -deficits include right sided hemiparesis, facial droop, dysphagia, dysarthria  -Oral thrush present: nystatin mouthwash ordered 4 times a day  -Oral hygiene x 4 daily prior to Nystatin treatment  -Double vision--> Eye patch     Comprehensive Multidisciplinary Rehab Program:   -Comprehensive rehab program of PT/OT/SLP - 3 hours a day, 5 days a week.   -PT - 90 mins: strengthening, coordination, balance, endurance, gait,  -OT - 60 mins: strengthening, coordination, fine motor, ADLs  -SLP - 30 mins: dysphagia, dysarthria, memory impairment  _______________________________________________________________________________    CONCURRENT MEDICAL PROBLEMS    Transaminitis:  - AST/ALT level (04/19) 76/95, (04/21) 47/86, (04/24) 32/51  - Limit Tylenol use  - Monitor  - Hospitalist F/U      HTN  Orthostatic/ Improved   - goal BP under 160mmHg  -TTE performed: negative for PFO w/ EF 70-75% and mild aortic stenosis -> recommend cardiac follow up outpatient   - lisinopril 20mg daily--> changed to 10 mg daily (04/22)  - HCTZ 25mg daily--> changed to 12.5 mg daily (04/22)--> D/C (04/23)   - Abdominal binderjerel   - Hospitalist to manage meds     Pain  - Tylenol PRN  - Avoid sedating medications that may interfere with cognitive recovery    Sleep  - Maintain quiet hours and a low stim environment.   - Melatonin PRN     GI / Bowel  - Senna qHS--> D/C  - Miralax Daily--> Changed to PRN (04/20)   - bisacodyl prn q12h 5mg po--> D/C   - monitor for loose stools     / Bladder  Urinary Retention  - Currently patient voids: independently  - Bladder scans Q8 hours with straight cath for >400cc. Completed   - Required holden placement previously and TOV started 4/16  - Flomax 0.4mg qhs     Skin / Pressure injury  - Turn q2 hours in bed while awake, air mattress  - nursing to monitor skin qShift  - soft heel protectors  - skin barrier cream for stage 2 pressure wound right buttock 1x1cm daily    Diet/Dysphagia:  - Diet Consistency: DASH diet, regular consistency and thin liquids   - Supplements: B12 1000mcg daily, Vit D 25mcg daily  - Aspiration Precautions  - SLP treatment on going (30 minutes)    - Nutrition consult    DVT prophylaxis:   - Lovenox 30mg subq daily  - Last Doppler BLE on 4/10 negative for DVTs      Outpatient Follow-up:  Logan Farmerul  Neurosurgery  [address] [place] [id number]  Phone: [phone]  Fax: [phone]  Follow Up Time: 1

## 2025-04-27 PROCEDURE — 99232 SBSQ HOSP IP/OBS MODERATE 35: CPT

## 2025-04-27 RX ADMIN — Medication 500000 UNIT(S): at 11:47

## 2025-04-27 RX ADMIN — LISINOPRIL 10 MILLIGRAM(S): 5 TABLET ORAL at 05:16

## 2025-04-27 RX ADMIN — Medication 500000 UNIT(S): at 05:16

## 2025-04-27 RX ADMIN — Medication 500000 UNIT(S): at 17:54

## 2025-04-27 RX ADMIN — ENOXAPARIN SODIUM 30 MILLIGRAM(S): 100 INJECTION SUBCUTANEOUS at 05:16

## 2025-04-27 RX ADMIN — TOUCHLESS CARE ZINC OXIDE PROTECTANT 1 APPLICATION(S): 20; 25 SPRAY TOPICAL at 07:36

## 2025-04-27 RX ADMIN — LISINOPRIL 10 MILLIGRAM(S): 5 TABLET ORAL at 17:54

## 2025-04-27 NOTE — PROGRESS NOTE ADULT - SUBJECTIVE AND OBJECTIVE BOX
Medicine Progress Note    Patient is a 75y old  Female who presents with a chief complaint of Acute pontine intracranial hemorrhage (27 Apr 2025 11:41)      SUBJECTIVE / OVERNIGHT EVENTS:    seen and examined  Chart reviewed  No overnight events  Limb weakness improving with therapy  BM+  No pain  No complaints       ROS:  denied fever/chills/CP/SOB/cough/palpitation/dizziness/abdominal pian/nausea/vomiting/diarrhoea/constipation/dysuria/leg or calf pain/headaches.all other ROS neg    MEDICATIONS  (STANDING):  enoxaparin Injectable 30 milliGRAM(s) SubCutaneous every 24 hours  lisinopril 10 milliGRAM(s) Oral two times a day  nystatin    Suspension 714041 Unit(s) Oral four times a day  zinc oxide 40% Paste 1 Application(s) Topical daily    MEDICATIONS  (PRN):  acetaminophen     Tablet .. 650 milliGRAM(s) Oral every 6 hours PRN Mild Pain (1 - 3)  melatonin 3 milliGRAM(s) Oral at bedtime PRN Insomnia    CAPILLARY BLOOD GLUCOSE        I&O's Summary      PHYSICAL EXAM:  Vital Signs Last 24 Hrs  T(C): 36.6 (27 Apr 2025 08:52), Max: 36.7 (26 Apr 2025 19:38)  T(F): 97.8 (27 Apr 2025 08:52), Max: 98.1 (26 Apr 2025 19:38)  HR: 71 (27 Apr 2025 08:52) (71 - 81)  BP: 160/79 (27 Apr 2025 08:52) (111/67 - 160/79)  BP(mean): --  RR: 16 (27 Apr 2025 08:52) (16 - 16)  SpO2: 96% (27 Apr 2025 08:52) (96% - 98%)    Parameters below as of 27 Apr 2025 08:52  Patient On (Oxygen Delivery Method): room air    GENERAL: Not in distress. Alert    HEENT: clear conjuctiva, MMM. no pallor or icterus. left eye diplopia, left eye patch  CARDIOVASCULAR: RRR S1, S2. No murmur/rubs/gallop  LUNGS: BLAE+, no rales, no wheezing, no rhonchi.    ABDOMEN: ND. Soft,  NT, no guarding / rebound / rigidity. BS normoactive  BACK: No spine tenderness.  EXTREMITIES: no edema. no leg or calf TP.  SKIN: warm and dry  PSYCHIATRIC: Calm.  No agitation.  CNS: AAO*3. RUE and RLE weakness and reduced sensation. mild dysarthria. mild right facial droop. follows commands          LABS:                    COVID-19 PCR: NotDetec (18 Apr 2025 21:00)      RADIOLOGY & ADDITIONAL TESTS:  Imaging from Last 24 Hours:    Electrocardiogram/QTc Interval:    COORDINATION OF CARE:  Care Discussed with Consultants/Other Providers:

## 2025-04-27 NOTE — PROGRESS NOTE ADULT - ASSESSMENT
JANET BUSTILLOS is a 75y with HTN and Raynaud's phenomenon admitted to [facility] for 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  Hospital Course complicated by urinary retention, leukocytosis, and chest pain. Patient now admitted for a multidisciplinary rehab program.     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)    -imaging demonstrated acute left pontine hemorrhage in setting of hypertensive emergency likely due to long term uncontrolled hypertension  -initially managed with cardene gtt and adjusted to antihypertensive outlined below  -goal SBP under 160mmHg  -No PFO present on TTE  -evaluated by neurosurgical team and temporarily managed in NSICU - no surgical intervention required   - Keep bed head at 35 degrees all the times and 90 degrees with food out of bed  -dysphagia reevaluated with MBS and upgraded to regular diet with thin liquids  -approved for DVT chemoprophylaxis by neurosurgery   -Repeat MRI of the head in 6 weeks and outpatient neurosurgery follow up (05/21)   -deficits include right sided hemiparesis, facial droop, dysphagia, dysarthria  -Oral thrush present: nystatin mouthwash ordered 4 times a day  -Oral hygiene x 4 daily prior to Nystatin treatment  -Double vision--> Eye patch     Comprehensive Multidisciplinary Rehab Program:   -Comprehensive rehab program of PT/OT/SLP - 3 hours a day, 5 days a week.   -PT - 90 mins: strengthening, coordination, balance, endurance, gait,  -OT - 60 mins: strengthening, coordination, fine motor, ADLs  -SLP - 30 mins: dysphagia, dysarthria, memory impairment  _______________________________________________________________________________    CONCURRENT MEDICAL PROBLEMS    Transaminitis:  - AST/ALT level (04/19) 76/95, (04/21) 47/86, (04/24) 32/51  - Limit Tylenol use  - Monitor  - Hospitalist F/U      HTN  Orthostatic/ Improved   - goal BP under 160mmHg  -TTE performed: negative for PFO w/ EF 70-75% and mild aortic stenosis -> recommend cardiac follow up outpatient   - lisinopril 20mg daily--> changed to 10 mg daily (04/22)  - HCTZ 25mg daily--> changed to 12.5 mg daily (04/22)--> D/C (04/23)   - Abdominal binderjerel   - Hospitalist to manage meds     Pain  - Tylenol PRN  - Avoid sedating medications that may interfere with cognitive recovery    Sleep  - Maintain quiet hours and a low stim environment.   - Melatonin PRN     GI / Bowel  - Senna qHS--> D/C  - Miralax Daily--> Changed to PRN (04/20)   - bisacodyl prn q12h 5mg po--> D/C   - monitor for loose stools     / Bladder  Urinary Retention  - Currently patient voids: independently  - Bladder scans Q8 hours with straight cath for >400cc. Completed   - Required holden placement previously and TOV started 4/16  - Flomax 0.4mg qhs     Skin / Pressure injury  - Turn q2 hours in bed while awake, air mattress  - nursing to monitor skin qShift  - soft heel protectors  - skin barrier cream for stage 2 pressure wound right buttock 1x1cm daily    Diet/Dysphagia:  - Diet Consistency: DASH diet, regular consistency and thin liquids   - Supplements: B12 1000mcg daily, Vit D 25mcg daily  - Aspiration Precautions  - SLP treatment on going (30 minutes)    - Nutrition consult    DVT prophylaxis:   - Lovenox 30mg subq daily  - Last Doppler BLE on 4/10 negative for DVTs      Outpatient Follow-up:  Logan Farmerul  Neurosurgery  [address] [place] [id number]  Phone: [phone]  Fax: [phone]  Follow Up Time: 1

## 2025-04-27 NOTE — PROGRESS NOTE ADULT - ASSESSMENT
75y with HTN and Raynaud's phenomenon admitted to [facility] for 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  Hospital Course complicated by urinary retention, leukocytosis, and chest pain. Patient now admitted for a multidisciplinary rehab program.     Acute left dorsal pontine hemorrhage   -goal SBP under 160mmHg  -No PFO present on TTE  -evaluated by neurosurgical team and temporarily managed in NSICU - no surgical intervention required   -approved for DVT chemoprophylaxis by neurosurgery   -requires repeat MRI of the head in 6 weeks and outpatient neurosurgery follow up  - comprehensive rehab  - fall, aspiration precautions     Hyponatremia:  improved    Transaminitis,  - denies abdominal pain  - Limit Tylenol use  - Monitor    HTN  - Target BP under 160mmHg  -TTE performed: negative for PFO w/ EF 70-75% and mild aortic stenosis   - lisinopril 10mg daily  - HCTZ 12.5 mg daily - d/mercedes   - Cardiac follow up outpatient post-dc    Acute Urinary Retention  - Improved  -Voiding well  Severe protein-calorie malnutrition.  - Nutrition is following    DVT prophylaxis:  Lovenox    d/w IDR team

## 2025-04-27 NOTE — PROGRESS NOTE ADULT - SUBJECTIVE AND OBJECTIVE BOX
HPI:  Michell Neves is a 74 year-old female PMHx of HTN and Raynaud's phenomenon who presented to [facility] ED on 4/9 for dizziness, nausea, and vomiting followed by facial droop and right sided weakness w/ diplopia. Patient found to have SBP over 200mmHg and started on cardene gtt. CTH w/o contrast demonstrated acute pontine hemorrhage and was transferred to [facility] for neurosurgical evaluation. No surgical intervention required. Patient admitted to NSICU and was monitored and treated with antihypertensives. Patient with initial dysphagia but MBS performed and cleared for regular diet w/ thin fluids. Course complicated by urinary retention. TOV initiated on 4/16 and patient started on flomax for a course of at least 3 weeks. Chest pain earlier in admission but workup negative for cardiac original likely related to hypertensive emergency. Leukocytosis now resolved. CXR performed with small bilateral pleural effusions and atelectasis not treated with antibiotics. Patient seen by PT/OT and physiatry recommending acute inpatient rehabilitation. Patient discharged to [facility] on 4/18.     Allergies  No Known Allergies    Subjective:  - Patient was seen and examined at during PT, NAD  - Sleeping well at night, no events\  no new events  -   - TDD (05/02) home     ROS: - Denies CP, palpitation, SOB, cough, fever, chills, headache, abdominal pain, N/V/D/C, dysuria, hematuria, joint pain or swelling. (+) dizziness, visual changes      MEDICATIONS  (STANDING):  enoxaparin Injectable 30 milliGRAM(s) SubCutaneous every 24 hours  lisinopril 10 milliGRAM(s) Oral two times a day  nystatin    Suspension 008596 Unit(s) Oral four times a day  zinc oxide 40% Paste 1 Application(s) Topical daily    MEDICATIONS  (PRN):  acetaminophen     Tablet .. 650 milliGRAM(s) Oral every 6 hours PRN Mild Pain (1 - 3)  melatonin 3 milliGRAM(s) Oral at bedtime PRN Insomnia          Recent Labs:   04-25    136  |  102  |  22  ----------------------------<  89  4.3   |  29  |  0.72    Ca    9.0      25 Apr 2025 05:36    TPro  6.3  /  Alb  2.8[L]  /  TBili  0.2  /  DBili  x   /  AST  32  /  ALT  51[H]  /  AlkPhos  92  04-24                       12.0   7.21  )-----------( 307      ( 24 Apr 2025 05:37 )             36.1     04-24    133[L]  |  99  |  22  ----------------------------<  81  4.5   |  26  |  0.64    Ca    9.3      24 Apr 2025 05:37    TPro  6.3  /  Alb  2.8[L]  /  TBili  0.2  /  DBili  x   /  AST  32  /  ALT  51[H]  /  AlkPhos  92  04-24    LIVER FUNCTIONS - ( 24 Apr 2025 05:37 )  Alb: 2.8 g/dL / Pro: 6.3 g/dL / ALK PHOS: 92 U/L / ALT: 51 U/L / AST: 32 U/L / GGT: x             Physical Exam:  Vital Signs Last 24 Hrs  T(C): 36.6 (27 Apr 2025 08:52), Max: 36.7 (26 Apr 2025 19:38)  T(F): 97.8 (27 Apr 2025 08:52), Max: 98.1 (26 Apr 2025 19:38)  HR: 71 (27 Apr 2025 08:52) (71 - 81)  BP: 160/79 (27 Apr 2025 08:52) (111/67 - 160/79)  BP(mean): --  RR: 16 (27 Apr 2025 08:52) (16 - 16)  SpO2: 96% (27 Apr 2025 08:52) (96% - 98%)    Parameters below as of 27 Apr 2025 08:52  Patient On (Oxygen Delivery Method): room air            Gen - NAD, Comfortable  HEENT - NCAT, EOMI, PERRLA, Eye patch is on  Neck - Supple, No limited ROM  Pulm - CTAB, No crackles  Cardiovascular - RRR, S1S2   Abdomen - Soft, NT, ND   Extremities - No C/C/E, No calf tenderness  Neuro- AAOx 4, follows command. Mild dysarthria, Mild right sided facial droop, diplopia present, visual fields intact, left eye medially directed but EOM are intact, right hemiparesis. Decreased to light touch of bilateral forehead, right cheek, RUE and RLE--> Improving   Psychiatric - Mood stable, Affect WNL  Skin:  1x1 stage 2 pressure wound of the right buttock

## 2025-04-28 ENCOUNTER — TRANSCRIPTION ENCOUNTER (OUTPATIENT)
Age: 75
End: 2025-04-28

## 2025-04-28 LAB
ALBUMIN SERPL ELPH-MCNC: 3.2 G/DL — LOW (ref 3.3–5)
ALP SERPL-CCNC: 90 U/L — SIGNIFICANT CHANGE UP (ref 40–120)
ALT FLD-CCNC: 46 U/L — HIGH (ref 10–45)
ANION GAP SERPL CALC-SCNC: 8 MMOL/L — SIGNIFICANT CHANGE UP (ref 5–17)
AST SERPL-CCNC: 26 U/L — SIGNIFICANT CHANGE UP (ref 10–40)
BASOPHILS # BLD AUTO: 0.08 K/UL — SIGNIFICANT CHANGE UP (ref 0–0.2)
BASOPHILS NFR BLD AUTO: 1 % — SIGNIFICANT CHANGE UP (ref 0–2)
BILIRUB SERPL-MCNC: 0.3 MG/DL — SIGNIFICANT CHANGE UP (ref 0.2–1.2)
BUN SERPL-MCNC: 23 MG/DL — SIGNIFICANT CHANGE UP (ref 7–23)
CALCIUM SERPL-MCNC: 9.2 MG/DL — SIGNIFICANT CHANGE UP (ref 8.4–10.5)
CHLORIDE SERPL-SCNC: 99 MMOL/L — SIGNIFICANT CHANGE UP (ref 96–108)
CO2 SERPL-SCNC: 28 MMOL/L — SIGNIFICANT CHANGE UP (ref 22–31)
CREAT SERPL-MCNC: 0.67 MG/DL — SIGNIFICANT CHANGE UP (ref 0.5–1.3)
EGFR: 91 ML/MIN/1.73M2 — SIGNIFICANT CHANGE UP
EGFR: 91 ML/MIN/1.73M2 — SIGNIFICANT CHANGE UP
EOSINOPHIL # BLD AUTO: 0.32 K/UL — SIGNIFICANT CHANGE UP (ref 0–0.5)
EOSINOPHIL NFR BLD AUTO: 4.1 % — SIGNIFICANT CHANGE UP (ref 0–6)
GLUCOSE SERPL-MCNC: 73 MG/DL — SIGNIFICANT CHANGE UP (ref 70–99)
HCT VFR BLD CALC: 39 % — SIGNIFICANT CHANGE UP (ref 34.5–45)
HGB BLD-MCNC: 12.6 G/DL — SIGNIFICANT CHANGE UP (ref 11.5–15.5)
IMM GRANULOCYTES NFR BLD AUTO: 0.4 % — SIGNIFICANT CHANGE UP (ref 0–0.9)
LYMPHOCYTES # BLD AUTO: 1.68 K/UL — SIGNIFICANT CHANGE UP (ref 1–3.3)
LYMPHOCYTES # BLD AUTO: 21.4 % — SIGNIFICANT CHANGE UP (ref 13–44)
MCHC RBC-ENTMCNC: 29.6 PG — SIGNIFICANT CHANGE UP (ref 27–34)
MCHC RBC-ENTMCNC: 32.3 G/DL — SIGNIFICANT CHANGE UP (ref 32–36)
MCV RBC AUTO: 91.8 FL — SIGNIFICANT CHANGE UP (ref 80–100)
MONOCYTES # BLD AUTO: 0.87 K/UL — SIGNIFICANT CHANGE UP (ref 0–0.9)
MONOCYTES NFR BLD AUTO: 11.1 % — SIGNIFICANT CHANGE UP (ref 2–14)
NEUTROPHILS # BLD AUTO: 4.87 K/UL — SIGNIFICANT CHANGE UP (ref 1.8–7.4)
NEUTROPHILS NFR BLD AUTO: 62 % — SIGNIFICANT CHANGE UP (ref 43–77)
NRBC BLD AUTO-RTO: 0 /100 WBCS — SIGNIFICANT CHANGE UP (ref 0–0)
PLATELET # BLD AUTO: 336 K/UL — SIGNIFICANT CHANGE UP (ref 150–400)
POTASSIUM SERPL-MCNC: 4 MMOL/L — SIGNIFICANT CHANGE UP (ref 3.5–5.3)
POTASSIUM SERPL-SCNC: 4 MMOL/L — SIGNIFICANT CHANGE UP (ref 3.5–5.3)
PROT SERPL-MCNC: 6.9 G/DL — SIGNIFICANT CHANGE UP (ref 6–8.3)
RBC # BLD: 4.25 M/UL — SIGNIFICANT CHANGE UP (ref 3.8–5.2)
RBC # FLD: 14.3 % — SIGNIFICANT CHANGE UP (ref 10.3–14.5)
SODIUM SERPL-SCNC: 135 MMOL/L — SIGNIFICANT CHANGE UP (ref 135–145)
WBC # BLD: 7.85 K/UL — SIGNIFICANT CHANGE UP (ref 3.8–10.5)
WBC # FLD AUTO: 7.85 K/UL — SIGNIFICANT CHANGE UP (ref 3.8–10.5)

## 2025-04-28 PROCEDURE — 99232 SBSQ HOSP IP/OBS MODERATE 35: CPT

## 2025-04-28 PROCEDURE — 99232 SBSQ HOSP IP/OBS MODERATE 35: CPT | Mod: GC

## 2025-04-28 RX ORDER — LISINOPRIL 5 MG/1
1 TABLET ORAL
Qty: 0 | Refills: 0 | DISCHARGE
Start: 2025-04-28

## 2025-04-28 RX ORDER — LISINOPRIL 5 MG/1
20 TABLET ORAL AT BEDTIME
Refills: 0 | Status: DISCONTINUED | OUTPATIENT
Start: 2025-04-28 | End: 2025-04-29

## 2025-04-28 RX ORDER — ACETAMINOPHEN 500 MG/5ML
2 LIQUID (ML) ORAL
Qty: 0 | Refills: 0 | DISCHARGE
Start: 2025-04-28

## 2025-04-28 RX ORDER — LISINOPRIL 5 MG/1
10 TABLET ORAL DAILY
Refills: 0 | Status: DISCONTINUED | OUTPATIENT
Start: 2025-04-29 | End: 2025-04-29

## 2025-04-28 RX ORDER — CYANOCOBALAMIN 1000 UG/ML
1000 INJECTION INTRAMUSCULAR; SUBCUTANEOUS
Refills: 0 | DISCHARGE

## 2025-04-28 RX ADMIN — LISINOPRIL 10 MILLIGRAM(S): 5 TABLET ORAL at 05:19

## 2025-04-28 RX ADMIN — TOUCHLESS CARE ZINC OXIDE PROTECTANT 1 APPLICATION(S): 20; 25 SPRAY TOPICAL at 05:20

## 2025-04-28 RX ADMIN — LISINOPRIL 20 MILLIGRAM(S): 5 TABLET ORAL at 21:24

## 2025-04-28 RX ADMIN — ENOXAPARIN SODIUM 30 MILLIGRAM(S): 100 INJECTION SUBCUTANEOUS at 05:19

## 2025-04-28 NOTE — DISCHARGE NOTE NURSING/CASE MANAGEMENT/SOCIAL WORK - FINANCIAL ASSISTANCE
[facility] provides services at a reduced cost to those who are determined to be eligible through [facility]’s financial assistance program. Information regarding [facility]’s financial assistance program can be found by going to [email or web address] or by calling [phone].

## 2025-04-28 NOTE — DISCHARGE NOTE PROVIDER - DETAILS OF MALNUTRITION DIAGNOSIS/DIAGNOSES
This patient has been assessed with a concern for Malnutrition and was treated during this hospitalization for the following Nutrition diagnosis/diagnoses:     -  04/19/2025: Severe protein-calorie malnutrition

## 2025-04-28 NOTE — DISCHARGE NOTE PROVIDER - HOSPITAL COURSE
HPI:  Michell Neves is a 74 year-old female PMHx of HTN and Raynaud's phenomenon who presented to [facility] ED on 4/9 for dizziness, nausea, and vomiting followed by facial droop and right sided weakness w/ diplopia. Patient found to have SBP over 200mmHg and started on cardene gtt. CTH w/o contrast demonstrated acute pontine hemorrhage and was transferred to [facility] for neurosurgical evaluation. No surgical intervention required. Patient admitted to NSICU and was monitored and treated with antihypertensives. Patient with initial dysphagia but MBS performed and cleared for regular diet w/ thin fluids. Course complicated by urinary retention. TOV initiated on 4/16 and patient started on flomax for a course of at least 3 weeks. Chest pain earlier in admission but workup negative for cardiac original likely related to hypertensive emergency. Leukocytosis now resolved. CXR performed with small bilateral pleural effusions and atelectasis not treated with antibiotics. Patient seen by PT/OT and physiatry recommending acute inpatient rehabilitation. Patient discharged to [facility] on 4/18.    Pt was stable upon rehab admission to  Inpatient Rehabilitation Facility. Admitted with gait instabilty, ADL, and functional impairments.     Rehab Course significant for:  -orthostatic hypotension, HCTZ was discontinued as well as flomax, no problems with urinary retention throughout rehabilitatino course  -Goal for patient's SBP to be under 140mmHg given hypertensive cause of intracranial hemorrhage. Able to adjust lisinopril to 20mg BID and patient able to tolerate well    All other medical co-morbidities were stable.     Pt tolerated course of inpatient PT/OT/SLP rehab with significant functional improvements and met rehab goals prior to discharge.    Pt was medically cleared on 5/2 for discharge home.    HPI:  Michell Neves is a 74 year-old female PMHx of HTN and Raynaud's phenomenon who presented to [facility] ED on 4/9 for dizziness, nausea, and vomiting followed by facial droop and right sided weakness w/ diplopia. Patient found to have SBP over 200mmHg and started on cardene gtt. CTH w/o contrast demonstrated acute pontine hemorrhage and was transferred to [facility] for neurosurgical evaluation. No surgical intervention required. Patient admitted to NSICU and was monitored and treated with antihypertensives. Patient with initial dysphagia but MBS performed and cleared for regular diet w/ thin fluids. Course complicated by urinary retention. TOV initiated on 4/16 and patient started on flomax for a course of at least 3 weeks. Chest pain earlier in admission but workup negative for cardiac original likely related to hypertensive emergency. Leukocytosis now resolved. CXR performed with small bilateral pleural effusions and atelectasis not treated with antibiotics. Patient seen by PT/OT and physiatry recommending acute inpatient rehabilitation. Patient discharged to [facility] on 4/18.    Pt was stable upon rehab admission to  Inpatient Rehabilitation Facility. Admitted with gait instabilty, ADL, and functional impairments.     Rehab Course significant for:  -orthostatic hypotension, HCTZ was discontinued as well as flomax, no problems with urinary retention throughout rehabilitation course  -Goal for patient's SBP to be under 140mmHg given hypertensive cause of intracranial hemorrhage. Able to adjust lisinopril to 10mg BID and patient able to tolerate well. Balance between hypertension and orthostatic hypotension.    All other medical co-morbidities were stable.     Pt tolerated course of inpatient PT/OT/SLP rehab with significant functional improvements and met rehab goals prior to discharge.    Pt was medically cleared on 5/2 for discharge home.

## 2025-04-28 NOTE — DISCHARGE NOTE PROVIDER - CARE PROVIDER_API CALL
Danny Correa  Physical/Rehab Medicine  [address] [place] [address]  Phone: [phone]  Fax: [phone]  Follow Up Time: 1 month    Logan Farmer  Neurosurgery  [address] [place] [address]  Phone: [phone]  Fax: [phone]  Follow Up Time: 1 week    Navi Sherman  Ophthalmology  [address] [place] [address] [address]  [place] [address]  Phone: [phone]  Fax: [phone]  Follow Up Time: 2 weeks   Danny Correa  Physical/Rehab Medicine  [address] [place] [address]  Phone: [phone]  Fax: [phone]  Follow Up Time: 1 month    Logan Farmer  Neurosurgery  [address] [place] [address]  Phone: [phone]  Fax: [phone]  Follow Up Time: 1 week    Navi Sherman  Ophthalmology  [address] [place] [address] [address]  [place] [address]  Phone: [phone]  Fax: [phone]  Follow Up Time: 2 weeks    Reyes, John Anthony  Internal Medicine  [address] [place] [address]  Phone: [phone]  Fax: [phone]  Follow Up Time: 1 week

## 2025-04-28 NOTE — DISCHARGE NOTE NURSING/CASE MANAGEMENT/SOCIAL WORK - NSDCPEFALRISK_GEN_ALL_CORE
For information on Fall & Injury Prevention, visit: [email or web address] OR  [email or web address] OR  https://www.cdc.gov/steadi/patient.html

## 2025-04-28 NOTE — PROGRESS NOTE ADULT - ASSESSMENT
75y with HTN and Raynaud's phenomenon admitted to [facility] for 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  Hospital Course complicated by urinary retention, leukocytosis, and chest pain. Patient now admitted for a multidisciplinary rehab program.     Acute left dorsal pontine hemorrhage   -goal SBP goal now:  under 140mmHg [ normotension] unless otherwise instructed by neurology/neurosurgery  -No PFO present on TTE  -evaluated by neurosurgical team and temporarily managed in NSICU - no surgical intervention required   -approved for DVT chemoprophylaxis by neurosurgery   - comprehensive rehab  - fall, aspiration precautions   - requires repeat MRI of the head in 6 weeks and outpatient neurosurgery follow up    HTN  h/o Hypertensive emergency and chest pain: now resolved  - no OH  - Target BP under 140mmHg now  -TTE performed: negative for PFO w/ EF 70-75% and mild aortic stenosis   - lisinopril 10mg BID.--> increased to Lisinopril 10 mg in am and 20 mg at h/s 4/28  - HCTZ 12.5 mg daily - d/mercedes   - Cardiac follow up outpatient in 1-2 weeks post-dc     Hyponatremia:  improved    Transaminitis,  - denies abdominal pain  - Limit Tylenol use  - Monitor      Acute Urinary Retention  - Improved  -Voiding well  Severe protein-calorie malnutrition.  - Nutrition is following    DVT prophylaxis:  Lovenox    d/w IDR team  75y with HTN and Raynaud's phenomenon admitted to [facility] for 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  Hospital Course complicated by urinary retention, leukocytosis, and chest pain. Patient now admitted for a multidisciplinary rehab program.     Acute left dorsal pontine hemorrhage  Right hemiparesis: improving  Left eye diplopia: improved   -goal SBP goal now:  under 140mmHg [ normotension] unless otherwise instructed by neurology/neurosurgery  -No PFO present on TTE  -evaluated by neurosurgical team and temporarily managed in NSICU - no surgical intervention required   -approved for DVT chemoprophylaxis by neurosurgery   - comprehensive rehab  - fall, aspiration precautions   - requires repeat MRI of the head in 6 weeks and outpatient neurosurgery follow up    HTN  h/o Hypertensive emergency and chest pain: now resolved  - no OH  - Target BP under 140mmHg now  -TTE performed: negative for PFO w/ EF 70-75% and mild aortic stenosis   - lisinopril 10mg BID.--> increased to Lisinopril 10 mg in am and 20 mg at h/s 4/28  - HCTZ 12.5 mg daily - d/mercedes   - Cardiac follow up outpatient in 1-2 weeks post-dc     Hyponatremia:  improved    Transaminitis,  - denies abdominal pain  - Limit Tylenol use  - Monitor      Acute Urinary Retention  - Improved  -Voiding well  Severe protein-calorie malnutrition.  - Nutrition is following    DVT prophylaxis:  Lovenox    d/w IDR team at IDR

## 2025-04-28 NOTE — DISCHARGE NOTE PROVIDER - NSDCQMCOGNITION_NEU_ALL_CORE
Note Text (......Xxx Chief Complaint.): This diagnosis correlates with the
Other (Free Text): Continue cicalfate with vaseline or coconut oil
Detail Level: Zone
Difficulty concentrating/Difficulty making decisions/Difficulty remembering

## 2025-04-28 NOTE — PROGRESS NOTE ADULT - ASSESSMENT
JANET BUSTILLOS is a 75y with HTN and Raynaud's phenomenon admitted to [facility] for 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  Hospital Course complicated by urinary retention, leukocytosis, and chest pain. Patient now admitted for a multidisciplinary rehab program.     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)    -imaging demonstrated acute left pontine hemorrhage in setting of hypertensive emergency likely due to long term uncontrolled hypertension  -initially managed with cardene gtt and adjusted to antihypertensive outlined below  -goal SBP under 160mmHg  -No PFO present on TTE  -evaluated by neurosurgical team and temporarily managed in NSICU - no surgical intervention required   - Keep bed head at 35 degrees all the times and 90 degrees with food out of bed  -dysphagia reevaluated with MBS and upgraded to regular diet with thin liquids  -approved for DVT chemoprophylaxis by neurosurgery   -Repeat MRI of the head in 6 weeks and outpatient neurosurgery follow up (05/21)   -deficits include right sided hemiparesis, facial droop, dysphagia, dysarthria  -Oral thrush present: nystatin mouthwash ordered 4 times a day  -Oral hygiene x 4 daily prior to Nystatin treatment  -Double vision--> Eye patch     Comprehensive Multidisciplinary Rehab Program:   -Comprehensive rehab program of PT/OT/SLP - 3 hours a day, 5 days a week.   -PT - 90 mins: strengthening, coordination, balance, endurance, gait,  -OT - 60 mins: strengthening, coordination, fine motor, ADLs  -SLP - 30 mins: dysphagia, dysarthria, memory impairment  _______________________________________________________________________________    CONCURRENT MEDICAL PROBLEMS    Transaminitis: resolved  - AST/ALT level (04/19) 76/95, (04/21) 47/86, (04/24) 32/51, (04/28) 26/46  - Limit Tylenol use  - Monitor  - Hospitalist F/U      HTN  Orthostatic/ Improved   - goal BP under 160mmHg  -TTE performed: negative for PFO w/ EF 70-75% and mild aortic stenosis -> recommend cardiac follow up outpatient   - lisinopril 20mg daily--> changed to 10 mg daily (04/22)  - HCTZ 25mg daily--> changed to 12.5 mg daily (04/22)--> D/C (04/23)   - Abdominal binderjerel   - Hospitalist to manage meds     Pain  - Tylenol PRN  - Avoid sedating medications that may interfere with cognitive recovery    Sleep  - Maintain quiet hours and a low stim environment.   - Melatonin PRN     GI / Bowel  - Senna qHS--> D/C  - Miralax Daily--> Changed to PRN (04/20)   - bisacodyl prn q12h 5mg po--> D/C   - monitor for loose stools     / Bladder  Urinary Retention  - Currently patient voids: independently  - Bladder scans Q8 hours with straight cath for >400cc. Completed   - Required holden placement previously and TOV started 4/16  - Flomax 0.4mg qhs     Skin / Pressure injury  - Turn q2 hours in bed while awake, air mattress  - nursing to monitor skin qShift  - soft heel protectors  - skin barrier cream for stage 2 pressure wound right buttock 1x1cm daily    Diet/Dysphagia:  - Diet Consistency: DASH diet, regular consistency and thin liquids   - Supplements: B12 1000mcg daily, Vit D 25mcg daily  - Aspiration Precautions  - SLP treatment on going (30 minutes)    - Nutrition consult    DVT prophylaxis:   - Lovenox 30mg subq daily  - Last Doppler BLE on 4/10 negative for DVTs      Outpatient Follow-up:  Logan Farmerul  Neurosurgery  [address] [place] [address]  Phone: [phone]  Fax: [phone]  Follow Up Time: 1    JANET BUSTILLOS is a 75y with HTN and Raynaud's phenomenon admitted to [facility] for 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  Hospital Course complicated by urinary retention, leukocytosis, and chest pain. Patient now admitted for a multidisciplinary rehab program.     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)    -imaging demonstrated acute left pontine hemorrhage in setting of hypertensive emergency likely due to long term uncontrolled hypertension  -initially managed with cardene gtt and adjusted to antihypertensive outlined below  -goal SBP under 160mmHg  -No PFO present on TTE  -evaluated by neurosurgical team and temporarily managed in NSICU - no surgical intervention required   - Keep bed head at 35 degrees all the times and 90 degrees with food out of bed  -dysphagia reevaluated with MBS and upgraded to regular diet with thin liquids  -approved for DVT chemoprophylaxis by neurosurgery   -Repeat MRI of the head in 6 weeks and outpatient neurosurgery follow up (05/21)   -deficits include right sided hemiparesis, facial droop, dysphagia, dysarthria  -Oral thrush present: nystatin mouthwash ordered 4 times a day  -Oral hygiene x 4 daily prior to Nystatin treatment  -Double vision--> Eye patch     Comprehensive Multidisciplinary Rehab Program:   -Comprehensive rehab program of PT/OT/SLP - 3 hours a day, 5 days a week.   -PT - 90 mins: strengthening, coordination, balance, endurance, gait,  -OT - 60 mins: strengthening, coordination, fine motor, ADLs  -SLP - 30 mins: dysphagia, dysarthria, memory impairment  _______________________________________________________________________________    CONCURRENT MEDICAL PROBLEMS    Transaminitis/ Improving   - AST/ALT level (04/19) 76/95, (04/21) 47/86, (04/24) 32/51, (04/28) 26/46  - Limit Tylenol use  - Monitor  - Hospitalist F/U      HTN  Orthostatic/ Improved   - goal BP under 160mmHg  -TTE performed: negative for PFO w/ EF 70-75% and mild aortic stenosis -> recommend cardiac follow up outpatient   - lisinopril 20mg daily--> changed to 10 mg daily (04/22)--> Changed to 10 mg po x 2 daily (04/28)   - HCTZ 25mg daily--> changed to 12.5 mg daily (04/22)--> D/C (04/23)   - Abdominal binderjerel   - Hospitalist to manage meds     Pain  - Tylenol PRN  - Avoid sedating medications that may interfere with cognitive recovery    Sleep  - Maintain quiet hours and a low stim environment.   - Melatonin PRN     GI / Bowel  - Senna qHS--> D/C  - Miralax Daily--> Changed to PRN (04/20)   - bisacodyl prn q12h 5mg po--> D/C   - monitor for loose stools     / Bladder  Urinary Retention  - Currently patient voids: independently  - Bladder scans Q8 hours with straight cath for >400cc. Completed   - Required holden placement previously and TOV started 4/16  - Flomax 0.4mg qhs     Skin / Pressure injury  - Turn q2 hours in bed while awake, air mattress  - nursing to monitor skin qShift  - soft heel protectors  - skin barrier cream for stage 2 pressure wound right buttock 1x1cm daily    Diet/Dysphagia:  - Diet Consistency: DASH diet, regular consistency and thin liquids   - Supplements: B12 1000mcg daily, Vit D 25mcg daily  - Aspiration Precautions  - SLP treatment on going (30 minutes)    - Nutrition consult    DVT prophylaxis:   - Lovenox 30mg subq daily  - Last Doppler BLE on 4/10 negative for DVTs      Outpatient Follow-up:  Logan Farmerul  Neurosurgery  [address] [place] [address]  Phone: [phone]  Fax: [phone]  Follow Up Time: 1    JANET BUSTILLOS is a 75y with HTN and Raynaud's phenomenon admitted to [facility] for 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  Hospital Course complicated by urinary retention, leukocytosis, and chest pain. Patient now admitted for a multidisciplinary rehab program.     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)    -imaging demonstrated acute left pontine hemorrhage in setting of hypertensive emergency likely due to long term uncontrolled hypertension  -initially managed with cardene gtt and adjusted to antihypertensive outlined below  -goal SBP under 160mmHg  -No PFO present on TTE  -evaluated by neurosurgical team and temporarily managed in NSICU - no surgical intervention required   - Keep bed head at 35 degrees all the times and 90 degrees with food out of bed  -dysphagia reevaluated with MBS and upgraded to regular diet with thin liquids  -approved for DVT chemoprophylaxis by neurosurgery   -Repeat MRI of the head in 6 weeks and outpatient neurosurgery follow up (05/21)   -deficits include right sided hemiparesis, facial droop, dysphagia, dysarthria  -Oral thrush present: nystatin mouthwash ordered 4 times a day  -Oral hygiene x 4 daily prior to Nystatin treatment  -Double vision--> Eye patch     Comprehensive Multidisciplinary Rehab Program:   -Comprehensive rehab program of PT/OT/SLP - 3 hours a day, 5 days a week.   -PT - 90 mins: strengthening, coordination, balance, endurance, gait,  -OT - 60 mins: strengthening, coordination, fine motor, ADLs  -SLP - 30 mins: dysphagia, dysarthria, memory impairment  _______________________________________________________________________________    CONCURRENT MEDICAL PROBLEMS    Transaminitis/ Improving   - AST/ALT level (04/19) 76/95, (04/21) 47/86, (04/24) 32/51, (04/28) 26/46  - Limit Tylenol use  - Monitor  - Hospitalist F/U      HTN  Orthostatic/ Improved   - goal BP under 160mmHg  -TTE performed: negative for PFO w/ EF 70-75% and mild aortic stenosis -> recommend cardiac follow up outpatient   - lisinopril 20mg daily--> changed to 10 mg daily (04/22)--> Changed to 10 mg po at am and 20 mg at pm (04/28), monitor orthostasis   - HCTZ 25mg daily--> changed to 12.5 mg daily (04/22)--> D/C (04/23)   - Abdominal binder, teds   - Hospitalist to manage meds     Pain  - Tylenol PRN  - Avoid sedating medications that may interfere with cognitive recovery    Sleep  - Maintain quiet hours and a low stim environment.   - Melatonin PRN     GI / Bowel  - Senna qHS--> D/C  - Miralax Daily--> Changed to PRN (04/20)   - bisacodyl prn q12h 5mg po--> D/C   - monitor for loose stools     / Bladder  Urinary Retention  - Currently patient voids: independently  - Bladder scans Q8 hours with straight cath for >400cc. Completed   - Required holden placement previously and TOV started 4/16  - Flomax 0.4mg qhs     Skin / Pressure injury  - Turn q2 hours in bed while awake, air mattress  - nursing to monitor skin qShift  - soft heel protectors  - skin barrier cream for stage 2 pressure wound right buttock 1x1cm daily    Diet/Dysphagia:  - Diet Consistency: DASH diet, regular consistency and thin liquids   - Supplements: B12 1000mcg daily, Vit D 25mcg daily  - Aspiration Precautions  - SLP treatment on going (30 minutes)    - Nutrition consult    DVT prophylaxis:   - Lovenox 30mg subq daily  - Last Doppler BLE on 4/10 negative for DVTs      Outpatient Follow-up:  Logan Farmerul  Neurosurgery  [address] [place] [address]  Phone: [phone]  Fax: [phone]  Follow Up Time: 1

## 2025-04-28 NOTE — DISCHARGE NOTE PROVIDER - PROVIDER TOKENS
PROVIDER:[TOKEN:[[id number]],FOLLOWUP:[1 month]],PROVIDER:[TOKEN:[[id number]],FOLLOWUP:[1 week]],PROVIDER:[TOKEN:[[id number]],FOLLOWUP:[2 weeks]] PROVIDER:[TOKEN:[[id number]],FOLLOWUP:[1 month]],PROVIDER:[TOKEN:[[id number]],FOLLOWUP:[1 week]],PROVIDER:[TOKEN:[[id number]],FOLLOWUP:[2 weeks]],PROVIDER:[TOKEN:[[id number]],FOLLOWUP:[1 week]]

## 2025-04-28 NOTE — DISCHARGE NOTE PROVIDER - NSDCCPCAREPLAN_GEN_ALL_CORE_FT
PRINCIPAL DISCHARGE DIAGNOSIS  Diagnosis: Cerebrovascular accident with intracranial hemorrhage  Assessment and Plan of Treatment: You were originally admitted to [facility] on 4/9 after developing a facial droop, right sided weakness, dizziness, and blurry vision. You were found to have emergently high blood pressure and a bleed within the brain. Your blood pressure was able to be controlled and your hemorrhage did not require surgery. You were admitted to [facility] on 4/28/25 for acute inpatient rehabilitation. You made good functional gains and were discharged home.      SECONDARY DISCHARGE DIAGNOSES  Diagnosis: Hypertension  Assessment and Plan of Treatment: Your hypertension medications were adjusted to the current regimen of lisiniopril twice a day. New prescriptions were sent to your pharmacy.

## 2025-04-28 NOTE — DISCHARGE NOTE NURSING/CASE MANAGEMENT/SOCIAL WORK - NSSCTYPOFSERV_GEN_ALL_CORE
You will have an evaluation for [facility] services. Your home visiting nurse will call you after your hospital discharge to schedule your first appointment.

## 2025-04-28 NOTE — DISCHARGE NOTE PROVIDER - NSDCMRMEDTOKEN_GEN_ALL_CORE_FT
acetaminophen 325 mg oral tablet: 2 tab(s) orally every 6 hours As needed Mild Pain (1 - 3)  lisinopril 10 mg oral tablet: 1 tab(s) orally once a day  lisinopril 20 mg oral tablet: 1 tab(s) orally once a day (at bedtime)  polyethylene glycol 3350 oral powder for reconstitution: 17 gram(s) orally once a day   acetaminophen 325 mg oral tablet: 2 tab(s) orally every 6 hours As needed Mild Pain (1 - 3)  lisinopril 10 mg oral tablet: 1 tab(s) orally every 12 hours  polyethylene glycol 3350 oral powder for reconstitution: 17 gram(s) orally once a day

## 2025-04-28 NOTE — PROGRESS NOTE ADULT - SUBJECTIVE AND OBJECTIVE BOX
HPI:  Michell Neves is a 74 year-old female PMHx of HTN and Raynaud's phenomenon who presented to [facility] ED on 4/9 for dizziness, nausea, and vomiting followed by facial droop and right sided weakness w/ diplopia. Patient found to have SBP over 200mmHg and started on cardene gtt. CTH w/o contrast demonstrated acute pontine hemorrhage and was transferred to [facility] for neurosurgical evaluation. No surgical intervention required. Patient admitted to NSICU and was monitored and treated with antihypertensives. Patient with initial dysphagia but MBS performed and cleared for regular diet w/ thin fluids. Course complicated by urinary retention. TOV initiated on 4/16 and patient started on flomax for a course of at least 3 weeks. Chest pain earlier in admission but workup negative for cardiac original likely related to hypertensive emergency. Leukocytosis now resolved. CXR performed with small bilateral pleural effusions and atelectasis not treated with antibiotics. Patient seen by PT/OT and physiatry recommending acute inpatient rehabilitation. Patient discharged to [facility] on 4/18.     Allergies  No Known Allergies    Subjective:  - Patient was seen and examined at during PT, NAD  - Sleeping well at night, no events  - No pain  - (+) Diplopia and dizziness, improving per patient today  - LBM (04/26), voiding without issues. Continent x 2    - Tolerating therapy, motivated, reduced ST to 30 min, PT 90 min and OT 60 min   - Her goal is to get better with her walking and vision, go home, prefers breakfast prior to therapy   - (+) Stage II, dysarthria, memory deficits   - Pressure injury and stroke education provided  - TDD (05/02) home     ROS: - Denies CP, palpitation, SOB, cough, fever, chills, headache, abdominal pain, N/V/D/C, dysuria, hematuria, joint pain or swelling. (+) dizziness, visual changes      MEDICATIONS  (STANDING):  enoxaparin Injectable 30 milliGRAM(s) SubCutaneous every 24 hours  lisinopril 10 milliGRAM(s) Oral two times a day  nystatin    Suspension 345031 Unit(s) Oral four times a day  zinc oxide 40% Paste 1 Application(s) Topical daily    MEDICATIONS  (PRN):  acetaminophen     Tablet .. 650 milliGRAM(s) Oral every 6 hours PRN Mild Pain (1 - 3)  melatonin 3 milliGRAM(s) Oral at bedtime PRN Insomnia      Recent Labs:                         12.6   7.85  )-----------( 336      ( 28 Apr 2025 06:04 )             39.0   04-28    135  |  99  |  23  ----------------------------<  73  4.0   |  28  |  0.67    Ca    9.2      28 Apr 2025 06:04    TPro  6.9  /  Alb  3.2[L]  /  TBili  0.3  /  DBili  x   /  AST  26  /  ALT  46[H]  /  AlkPhos  90  04-28      04-25    136  |  102  |  22  ----------------------------<  89  4.3   |  29  |  0.72    Ca    9.0      25 Apr 2025 05:36    TPro  6.3  /  Alb  2.8[L]  /  TBili  0.2  /  DBili  x   /  AST  32  /  ALT  51[H]  /  AlkPhos  92  04-24                       12.0   7.21  )-----------( 307      ( 24 Apr 2025 05:37 )             36.1     04-24    133[L]  |  99  |  22  ----------------------------<  81  4.5   |  26  |  0.64    Ca    9.3      24 Apr 2025 05:37    TPro  6.3  /  Alb  2.8[L]  /  TBili  0.2  /  DBili  x   /  AST  32  /  ALT  51[H]  /  AlkPhos  92  04-24    LIVER FUNCTIONS - ( 24 Apr 2025 05:37 )  Alb: 2.8 g/dL / Pro: 6.3 g/dL / ALK PHOS: 92 U/L / ALT: 51 U/L / AST: 32 U/L / GGT: x           Physical Exam:  Vital Signs Last 24 Hrs  T(C): 36.5 (28 Apr 2025 08:39), Max: 36.6 (27 Apr 2025 08:52)  T(F): 97.7 (28 Apr 2025 08:39), Max: 97.8 (27 Apr 2025 08:52)  HR: 61 (28 Apr 2025 08:39) (61 - 71)  BP: 151/81 (28 Apr 2025 08:39) (129/73 - 160/79)  BP(mean): --  RR: 16 (28 Apr 2025 08:39) (16 - 16)  SpO2: 99% (28 Apr 2025 08:39) (96% - 99%)  Parameters below as of 28 Apr 2025 08:39  Patient On (Oxygen Delivery Method): room air    Gen - NAD, Comfortable  HEENT - NCAT, EOMI, PERRLA, Eye patch is on  Neck - Supple, No limited ROM  Pulm - CTAB, No crackles  Cardiovascular - RRR, S1S2   Abdomen - Soft, NT, ND   Extremities - No C/C/E, No calf tenderness  Neuro- AAOx 4, follows command. Mild dysarthria, Mild right sided facial droop, diplopia present, visual fields intact, left eye medially directed but EOM are intact, right hemiparesis. Decreased to light touch of bilateral forehead, right cheek, RUE and RLE--> Improving   Psychiatric - Mood stable, Affect WNL  Skin:  1x1 stage 2 pressure wound of the right buttock                               HPI:  Michell Neves is a 74 year-old female PMHx of HTN and Raynaud's phenomenon who presented to [facility] ED on 4/9 for dizziness, nausea, and vomiting followed by facial droop and right sided weakness w/ diplopia. Patient found to have SBP over 200mmHg and started on cardene gtt. CTH w/o contrast demonstrated acute pontine hemorrhage and was transferred to [facility] for neurosurgical evaluation. No surgical intervention required. Patient admitted to NSICU and was monitored and treated with antihypertensives. Patient with initial dysphagia but MBS performed and cleared for regular diet w/ thin fluids. Course complicated by urinary retention. TOV initiated on 4/16 and patient started on flomax for a course of at least 3 weeks. Chest pain earlier in admission but workup negative for cardiac original likely related to hypertensive emergency. Leukocytosis now resolved. CXR performed with small bilateral pleural effusions and atelectasis not treated with antibiotics. Patient seen by PT/OT and physiatry recommending acute inpatient rehabilitation. Patient discharged to [facility] on 4/18.     Allergies  No Known Allergies    Subjective:  - Patient was seen and examined at during PT, NAD  - Sleeping well at night, no events  - No pain  - (+) Diplopia and dizziness, Eye patch is off, improving per patient today  - LBM (04/26), voiding without issues. Continent x 2    - Tolerating therapy, motivated, reduced ST to 30 min, PT 90 min and OT 60 min   - Her goal is to get better with her walking and vision, go home, prefers breakfast prior to therapy   - (+) Stage II, dysarthria, memory deficits   - Pressure injury and stroke education provided  - TDD (05/02) home     ROS: - Denies CP, palpitation, SOB, cough, fever, chills, headache, abdominal pain, N/V/D/C, dysuria, hematuria, joint pain or swelling. (+) dizziness, visual changes      MEDICATIONS  (STANDING):  enoxaparin Injectable 30 milliGRAM(s) SubCutaneous every 24 hours  lisinopril 10 milliGRAM(s) Oral two times a day  nystatin    Suspension 543942 Unit(s) Oral four times a day  zinc oxide 40% Paste 1 Application(s) Topical daily    MEDICATIONS  (PRN):  acetaminophen     Tablet .. 650 milliGRAM(s) Oral every 6 hours PRN Mild Pain (1 - 3)  melatonin 3 milliGRAM(s) Oral at bedtime PRN Insomnia      Recent Labs:                         12.6   7.85  )-----------( 336      ( 28 Apr 2025 06:04 )             39.0   04-28    135  |  99  |  23  ----------------------------<  73  4.0   |  28  |  0.67    Ca    9.2      28 Apr 2025 06:04    TPro  6.9  /  Alb  3.2[L]  /  TBili  0.3  /  DBili  x   /  AST  26  /  ALT  46[H]  /  AlkPhos  90  04-28        Physical Exam:  Vital Signs Last 24 Hrs  T(C): 36.5 (28 Apr 2025 08:39), Max: 36.6 (27 Apr 2025 08:52)  T(F): 97.7 (28 Apr 2025 08:39), Max: 97.8 (27 Apr 2025 08:52)  HR: 61 (28 Apr 2025 08:39) (61 - 71)  BP: 151/81 (28 Apr 2025 08:39) (129/73 - 160/79)  RR: 16 (28 Apr 2025 08:39) (16 - 16)  SpO2: 99% (28 Apr 2025 08:39) (96% - 99%)  Parameters below as of 28 Apr 2025 08:39  Patient On (Oxygen Delivery Method): room air    Gen - NAD, Comfortable  HEENT - NCAT, EOMI, PERRLA, Eye patch is off this am  Neck - Supple, No limited ROM  Pulm - CTAB, No crackles  Cardiovascular - RRR, S1S2   Abdomen - Soft, NT, ND   Extremities - No C/C/E, No calf tenderness  Neuro- AAOx 4, follows command. Mild dysarthria, Mild right sided facial droop, diplopia present, visual fields intact, left eye medially directed but EOM are intact, right hemiparesis. Decreased to light touch of bilateral forehead, right cheek, RUE and RLE--> Improving   Psychiatric - Mood stable, Affect WNL  Skin:  1x1 stage 2 pressure wound of the right buttock

## 2025-04-28 NOTE — DISCHARGE NOTE NURSING/CASE MANAGEMENT/SOCIAL WORK - PATIENT PORTAL LINK FT
You can access the FollowMyHealth Patient Portal offered by [facility] by registering at the following website: [email or web address]. By joining [facility]’s FollowMyHealth portal, you will also be able to view your health information using other applications (apps) compatible with our system.

## 2025-04-28 NOTE — DISCHARGE NOTE PROVIDER - CARE PROVIDERS DIRECT ADDRESSES
[email or web address]

## 2025-04-28 NOTE — PROGRESS NOTE ADULT - SUBJECTIVE AND OBJECTIVE BOX
Medicine Progress Note    Patient is a 75y old  Female who presents with a chief complaint of Acute pontine intracranial hemorrhage (28 Apr 2025 08:41)      SUBJECTIVE / OVERNIGHT EVENTS:    ADDITIONAL REVIEW OF SYSTEMS:    MEDICATIONS  (STANDING):  enoxaparin Injectable 30 milliGRAM(s) SubCutaneous every 24 hours  lisinopril 10 milliGRAM(s) Oral two times a day  nystatin    Suspension 882190 Unit(s) Oral four times a day  zinc oxide 40% Paste 1 Application(s) Topical daily    MEDICATIONS  (PRN):  acetaminophen     Tablet .. 650 milliGRAM(s) Oral every 6 hours PRN Mild Pain (1 - 3)  melatonin 3 milliGRAM(s) Oral at bedtime PRN Insomnia    CAPILLARY BLOOD GLUCOSE        I&O's Summary      PHYSICAL EXAM:  Vital Signs Last 24 Hrs  T(C): 36.5 (28 Apr 2025 08:39), Max: 36.5 (27 Apr 2025 19:37)  T(F): 97.7 (28 Apr 2025 08:39), Max: 97.7 (27 Apr 2025 19:37)  HR: 61 (28 Apr 2025 08:39) (61 - 66)  BP: 151/81 (28 Apr 2025 08:39) (129/73 - 157/89)  BP(mean): --  RR: 16 (28 Apr 2025 08:39) (16 - 16)  SpO2: 99% (28 Apr 2025 08:39) (97% - 99%)    Parameters below as of 28 Apr 2025 08:39  Patient On (Oxygen Delivery Method): room air      CONSTITUTIONAL: NAD, well-developed, well-groomed  ENMT: Moist oral mucosa, no pharyngeal injection or exudates; normal dentition  RESPIRATORY: Normal respiratory effort; lungs are clear to auscultation bilaterally  CARDIOVASCULAR: Regular rate and rhythm, normal S1 and S2, no murmur/rub/gallop; No lower extremity edema; Peripheral pulses are 2+ bilaterally  ABDOMEN: Nontender to palpation, normoactive bowel sounds, no rebound/guarding; No hepatosplenomegaly  PSYCH: A+O to person, place, and time; affect appropriate  NEUROLOGY: CN 2-12 are intact and symmetric; no gross sensory deficits   SKIN: No rashes; no palpable lesions    LABS:                        12.6   7.85  )-----------( 336      ( 28 Apr 2025 06:04 )             39.0     04-28    135  |  99  |  23  ----------------------------<  73  4.0   |  28  |  0.67    Ca    9.2      28 Apr 2025 06:04    TPro  6.9  /  Alb  3.2[L]  /  TBili  0.3  /  DBili  x   /  AST  26  /  ALT  46[H]  /  AlkPhos  90  04-28          Urinalysis Basic - ( 28 Apr 2025 06:04 )    Color: x / Appearance: x / SG: x / pH: x  Gluc: 73 mg/dL / Ketone: x  / Bili: x / Urobili: x   Blood: x / Protein: x / Nitrite: x   Leuk Esterase: x / RBC: x / WBC x   Sq Epi: x / Non Sq Epi: x / Bacteria: x        COVID-19 PCR: NotDetec (18 Apr 2025 21:00)      RADIOLOGY & ADDITIONAL TESTS:  Imaging from Last 24 Hours:    Electrocardiogram/QTc Interval:    COORDINATION OF CARE:  Care Discussed with Consultants/Other Providers:   Medicine Progress Note    Patient is a 75y old  Female who presents with a chief complaint of Acute pontine intracranial hemorrhage (28 Apr 2025 08:41)      SUBJECTIVE / OVERNIGHT EVENTS:  seen and examined  Chart reviewed  No overnight events  Limb weakness improving with therapy  BM+  No pain  No complaints  blurry vision is improving. no more double vision today      ROS:  denied fever/chills/CP/SOB/cough/palpitation/dizziness/abdominal pian/nausea/vomiting/diarrhoea/constipation/dysuria/leg or calf pain/headaches.all other ROS neg    MEDICATIONS  (STANDING):  enoxaparin Injectable 30 milliGRAM(s) SubCutaneous every 24 hours  lisinopril 10 milliGRAM(s) Oral two times a day  nystatin    Suspension 818508 Unit(s) Oral four times a day  zinc oxide 40% Paste 1 Application(s) Topical daily    MEDICATIONS  (PRN):  acetaminophen     Tablet .. 650 milliGRAM(s) Oral every 6 hours PRN Mild Pain (1 - 3)  melatonin 3 milliGRAM(s) Oral at bedtime PRN Insomnia    CAPILLARY BLOOD GLUCOSE        I&O's Summary      PHYSICAL EXAM:  Vital Signs Last 24 Hrs  T(C): 36.5 (28 Apr 2025 08:39), Max: 36.5 (27 Apr 2025 19:37)  T(F): 97.7 (28 Apr 2025 08:39), Max: 97.7 (27 Apr 2025 19:37)  HR: 61 (28 Apr 2025 08:39) (61 - 66)  BP: 151/81 (28 Apr 2025 08:39) (129/73 - 157/89)  BP(mean): --  RR: 16 (28 Apr 2025 08:39) (16 - 16)  SpO2: 99% (28 Apr 2025 08:39) (97% - 99%)    Parameters below as of 28 Apr 2025 08:39  Patient On (Oxygen Delivery Method): room air    GENERAL: Not in distress. Alert    HEENT: clear conjuctiva, MMM. no pallor or icterus. no diplopia, EOMI,PEARLA  CARDIOVASCULAR: RRR S1, S2. No murmur/rubs/gallop  LUNGS: BLAE+, no rales, no wheezing, no rhonchi.    ABDOMEN: ND. Soft,  NT, no guarding / rebound / rigidity. BS normoactive  BACK: No spine tenderness.  EXTREMITIES: no edema. no leg or calf TP.  SKIN: warm and dry  PSYCHIATRIC: Calm.  No agitation.  CNS: AAO*3. RUE and RLE weakness and reduced sensation. mild dysarthria. mild right facial droop. follows commands      LABS:                        12.6   7.85  )-----------( 336      ( 28 Apr 2025 06:04 )             39.0     04-28    135  |  99  |  23  ----------------------------<  73  4.0   |  28  |  0.67    Ca    9.2      28 Apr 2025 06:04    TPro  6.9  /  Alb  3.2[L]  /  TBili  0.3  /  DBili  x   /  AST  26  /  ALT  46[H]  /  AlkPhos  90  04-28          Urinalysis Basic - ( 28 Apr 2025 06:04 )    Color: x / Appearance: x / SG: x / pH: x  Gluc: 73 mg/dL / Ketone: x  / Bili: x / Urobili: x   Blood: x / Protein: x / Nitrite: x   Leuk Esterase: x / RBC: x / WBC x   Sq Epi: x / Non Sq Epi: x / Bacteria: x        COVID-19 PCR: NotDetec (18 Apr 2025 21:00)      RADIOLOGY & ADDITIONAL TESTS:  Imaging from Last 24 Hours:    Electrocardiogram/QTc Interval:    COORDINATION OF CARE:  Care Discussed with Consultants/Other Providers:

## 2025-04-29 PROCEDURE — 99232 SBSQ HOSP IP/OBS MODERATE 35: CPT

## 2025-04-29 PROCEDURE — 99233 SBSQ HOSP IP/OBS HIGH 50: CPT | Mod: GC

## 2025-04-29 RX ORDER — LISINOPRIL 5 MG/1
1 TABLET ORAL
Qty: 0 | Refills: 0 | DISCHARGE
Start: 2025-04-29

## 2025-04-29 RX ORDER — LISINOPRIL 5 MG/1
10 TABLET ORAL EVERY 12 HOURS
Refills: 0 | Status: DISCONTINUED | OUTPATIENT
Start: 2025-04-29 | End: 2025-05-02

## 2025-04-29 RX ADMIN — ENOXAPARIN SODIUM 30 MILLIGRAM(S): 100 INJECTION SUBCUTANEOUS at 05:57

## 2025-04-29 RX ADMIN — TOUCHLESS CARE ZINC OXIDE PROTECTANT 1 APPLICATION(S): 20; 25 SPRAY TOPICAL at 05:58

## 2025-04-29 RX ADMIN — LISINOPRIL 10 MILLIGRAM(S): 5 TABLET ORAL at 17:26

## 2025-04-29 RX ADMIN — LISINOPRIL 10 MILLIGRAM(S): 5 TABLET ORAL at 05:56

## 2025-04-29 NOTE — PROGRESS NOTE ADULT - ASSESSMENT
HPI:  Michell Neves is a 74 year-old female PMHx of HTN and Raynaud's phenomenon who presented to [facility] ED on 4/9 for dizziness, nausea, and vomiting followed by facial droop and right sided weakness w/ diplopia. Patient found to have SBP over 200mmHg. CTH w/o contrast demonstrated acute pontine hemorrhage and was transferred to [facility] for neurosurgical evaluation. No surgical intervention required. Course complicated by urinary retention and chest pain after w/u was attributed to the HTN emergency. TOV initiated on 4/16 and patient started on flomax for a course of at least 3 weeks.  Patient seen by PT/OT and physiatry recommending acute inpatient rehabilitation. Patient admitted to [facility] on 4/18.     =Acute left dorsal pontine hemorrhage  -SBP goal < 160  -TTE negative for PFO  -Neurosurg team from NSICU - no surgical intervention at this time  -DVT chemoppx approved by neurosurg  -6week repeat MRI of head and follow up with neurosurg  -Noted bp elevated 163/78 in AM but during PT patient had SBP drop of ~25 points.  change lisinopril to 10mg bid    =HTN  -BP goal <160 SBP  -EF 70-75% with mild AS  -Cardiac f/u out patient  -Lisinopril BID - changed to 10mg bid  -HCTZ dc'd    ==Oral thrush  -Nystatin oral swish x10 days stop on April 28th    =Transaminitis   -Trended down AST 32->26  4/28, ALT 51->46 4/28  -Monitor on routine panel  -no pain on abdomen  -Tylenol use limited      DVT PX- Lovenox 30mg daily    Discussed with dr Hollis   HPI:  Michell Neves is a 74 year-old female PMHx of HTN and Raynaud's phenomenon who presented to [facility] ED on 4/9 for dizziness, nausea, and vomiting followed by facial droop and right sided weakness w/ diplopia. Patient found to have SBP over 200mmHg. CTH w/o contrast demonstrated acute pontine hemorrhage and was transferred to [facility] for neurosurgical evaluation. No surgical intervention required. Course complicated by urinary retention and chest pain after w/u was attributed to the HTN emergency. TOV initiated on 4/16 and patient started on flomax for a course of at least 3 weeks.  Patient seen by PT/OT and physiatry recommending acute inpatient rehabilitation. Patient admitted to [facility] on 4/18.     =Acute left dorsal pontine hemorrhage  -SBP goal < 160  -TTE negative for PFO  -Neurosurg team from NSICU - no surgical intervention at this time  -DVT chemoppx approved by neurosurg  -6week repeat MRI of head and follow up with neurosurg  -Noted bp elevated 163/78 in AM but during PT patient had SBP drop of ~25 points.  change lisinopril to 10mg bid    =HTN  -BP goal <160 SBP  -EF 70-75% with mild AS  -Cardiac f/u out patient  -Lisinopril BID - changed to 10mg bid  -HCTZ dc'd      =Transaminitis   -Trended down AST 32->26  4/28, ALT 51->46 4/28  -Monitor on routine panel  -no pain on abdomen  -Tylenol use limited      DVT PX- Lovenox 30mg daily    Discussed with dr Hollis   HPI:  Michell Neves is a 74 year-old female PMHx of HTN and Raynaud's phenomenon who presented to [facility] ED on 4/9 for dizziness, nausea, and vomiting followed by facial droop and right sided weakness w/ diplopia. Patient found to have SBP over 200mmHg. CTH w/o contrast demonstrated acute pontine hemorrhage and was transferred to [facility] for neurosurgical evaluation. No surgical intervention required. Course complicated by urinary retention and chest pain after w/u was attributed to the HTN emergency. TOV initiated on 4/16 and patient started on flomax for a course of at least 3 weeks.  Patient seen by PT/OT and physiatry recommending acute inpatient rehabilitation. Patient admitted to [facility] on 4/18.     =Acute left dorsal pontine hemorrhage  -SBP goal < 140  -TTE negative for PFO  -Neurosurg team from NSICU - no surgical intervention at this time  -DVT chemoppx approved by neurosurg  -6week repeat MRI of head and follow up with neurosurg  -Noted bp elevated 163/78 in AM but during PT patient had SBP drop of ~25 points.  change lisinopril to 10mg bid    =HTN  -BP goal <140 SBP  -EF 70-75% with mild AS  -Cardiac f/u out patient  -Lisinopril BID - changed to 10mg bid  -HCTZ dc'd      =Transaminitis   -Trended down AST 32->26  4/28, ALT 51->46 4/28  -Monitor on routine panel  -no pain on abdomen  -Tylenol use limited      DVT PX- Lovenox 30mg daily    Discussed with dr Hollis

## 2025-04-29 NOTE — PROGRESS NOTE ADULT - ASSESSMENT
75y with HTN and Raynaud's phenomenon admitted to [facility] for 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  Hospital Course complicated by urinary retention, leukocytosis, and chest pain. Patient now admitted for a multidisciplinary rehab program.     Acute left dorsal pontine hemorrhage  Right hemiparesis: improving  Left eye diplopia: improved   -goal SBP goal now:  under 140mmHg [ normotension] unless otherwise instructed by neurology/neurosurgery  -No PFO present on TTE  -evaluated by neurosurgical team and temporarily managed in NSICU - no surgical intervention required   -approved for DVT chemoprophylaxis by neurosurgery   - comprehensive rehab  - fall, aspiration precautions   - requires repeat MRI of the head in 6 weeks and outpatient neurosurgery follow up    HTN  h/o Hypertensive emergency and chest pain: now resolved  - OH+ this am during PT 4/29> reduced Lisinopril dose back to 10 mg BID with holding. OH was neg with this regimen.   - lisinopril 10mg BID.--> increased to Lisinopril 10 mg in am and 20 mg at h/s 4/28  - Target BP under 140mmHg now  -TTE performed: negative for PFO w/ EF 70-75% and mild aortic stenosis   - HCTZ 12.5 mg daily - d/mercedes due to OH  - patient needs to follow up with PCP in one week for repeat BP  - Cardiac follow up outpatient in 2 weeks post-dc     Hyponatremia:  improved    Transaminitis,  - Improving  - denies abdominal pain  - Limit Tylenol use  - Monitor      Acute Urinary Retention  - Improved  -Voiding well  Severe protein-calorie malnutrition.  - Nutrition is following    DVT prophylaxis:  Lovenox    d/w Dr. Correa, Dr. Romo

## 2025-04-29 NOTE — PROGRESS NOTE ADULT - ASSESSMENT
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  Hospital Course complicated by urinary retention, leukocytosis, and chest pain. Patient now admitted for a multidisciplinary rehab program.     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)    -imaging demonstrated acute left pontine hemorrhage in setting of hypertensive emergency likely due to long term uncontrolled hypertension  -initially managed with cardene gtt and adjusted to antihypertensive outlined below  -goal SBP under 160mmHg  -No PFO present on TTE  -evaluated by neurosurgical team and temporarily managed in NSICU - no surgical intervention required   - Keep bed head at 35 degrees all the times and 90 degrees with food out of bed  -dysphagia reevaluated with MBS and upgraded to regular diet with thin liquids  -approved for DVT chemoprophylaxis by neurosurgery   -Repeat MRI of the head in 6 weeks and outpatient neurosurgery follow up (05/21)   -deficits include right sided hemiparesis, facial droop, dysphagia, dysarthria  -Oral thrush present: nystatin mouthwash ordered 4 times a day  -Oral hygiene x 4 daily prior to Nystatin treatment  -Double vision--> Eye patch     Comprehensive Multidisciplinary Rehab Program:   -Comprehensive rehab program of PT/OT/SLP - 3 hours a day, 5 days a week.   -PT - 90 mins: strengthening, coordination, balance, endurance, gait,  -OT - 60 mins: strengthening, coordination, fine motor, ADLs  -SLP - 30 mins: dysphagia, dysarthria, memory impairment  _______________________________________________________________________________    CONCURRENT MEDICAL PROBLEMS    Transaminitis/ Improving   - AST/ALT level (04/19) 76/95, (04/21) 47/86, (04/24) 32/51, (04/28) 26/46  - Limit Tylenol use  - Monitor  - Hospitalist F/U      HTN  Orthostatic/ Improved   - goal BP under 160mmHg  -TTE performed: negative for PFO w/ EF 70-75% and mild aortic stenosis -> recommend cardiac follow up outpatient   - lisinopril 20mg daily--> changed to 10 mg daily (04/22)--> Changed to 10 mg po at am and 20 mg at pm (04/28), monitor orthostasis   - HCTZ 25mg daily--> changed to 12.5 mg daily (04/22)--> D/C (04/23)   - Abdominal binder, teds   - Hospitalist to manage meds     Pain  - Tylenol PRN  - Avoid sedating medications that may interfere with cognitive recovery    Sleep  - Maintain quiet hours and a low stim environment.   - Melatonin PRN     GI / Bowel  - Senna qHS--> D/C  - Miralax Daily--> Changed to PRN (04/20)   - bisacodyl prn q12h 5mg po--> D/C   - monitor for loose stools     / Bladder  Urinary Retention  - Currently patient voids: independently  - Bladder scans Q8 hours with straight cath for >400cc. Completed   - Required holden placement previously and TOV started 4/16  - Flomax 0.4mg qhs     Skin / Pressure injury  - Turn q2 hours in bed while awake, air mattress  - nursing to monitor skin qShift  - soft heel protectors  - skin barrier cream for stage 2 pressure wound right buttock 1x1cm daily    Diet/Dysphagia:  - Diet Consistency: DASH diet, regular consistency and thin liquids   - Supplements: B12 1000mcg daily, Vit D 25mcg daily  - Aspiration Precautions  - SLP treatment on going (30 minutes)    - Nutrition consult    DVT prophylaxis:   - Lovenox 30mg subq daily  - Last Doppler BLE on 4/10 negative for DVTs      Outpatient Follow-up:  Logan Farmerul  Neurosurgery  [address] [place] [id number]  Phone: [phone]  Fax: [phone]  Follow Up Time: 1    JANET BUSTILLOS is a 75y with HTN and Raynaud's phenomenon admitted to [facility] for 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  Hospital Course complicated by urinary retention, leukocytosis, and chest pain. Patient now admitted for a multidisciplinary rehab program.     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)    -imaging demonstrated acute left pontine hemorrhage in setting of hypertensive emergency likely due to long term uncontrolled hypertension  -initially managed with cardene gtt and adjusted to antihypertensive outlined below  -goal SBP under 160mmHg  -No PFO present on TTE  -evaluated by neurosurgical team and temporarily managed in NSICU - no surgical intervention required   - Keep bed head at 35 degrees all the times and 90 degrees with food out of bed  -dysphagia reevaluated with MBS and upgraded to regular diet with thin liquids  -approved for DVT chemoprophylaxis by neurosurgery   -Repeat MRI of the head in 6 weeks and outpatient neurosurgery follow up (05/21)   -deficits include right sided hemiparesis, facial droop, dysphagia, dysarthria  -Oral thrush present: nystatin mouthwash ordered 4 times a day  -Oral hygiene x 4 daily prior to Nystatin treatment  -Double vision--> Eye patch     Comprehensive Multidisciplinary Rehab Program:   -Comprehensive rehab program of PT/OT/SLP - 3 hours a day, 5 days a week.   -PT - 90 mins: strengthening, coordination, balance, endurance, gait,  -OT - 60 mins: strengthening, coordination, fine motor, ADLs  -SLP - 30 mins: dysphagia, dysarthria, memory impairment  _______________________________________________________________________________    CONCURRENT MEDICAL PROBLEMS    Transaminitis/ Improving   - AST/ALT level (04/19) 76/95, (04/21) 47/86, (04/24) 32/51, (04/28) 26/46  - Limit Tylenol use  - Monitor  - Hospitalist F/U      HTN  Orthostatic/ Improved   - goal BP under 160mmHg  -TTE performed: negative for PFO w/ EF 70-75% and mild aortic stenosis -> recommend cardiac follow up outpatient   - lisinopril 20mg daily--> changed to 10 mg daily (04/22)--> Changed to 10 mg po at am and 20 mg at pm (04/28), monitor orthostasis (+)--> Change to 10 mg po x 2 daily   - HCTZ 25mg daily--> changed to 12.5 mg daily (04/22)--> D/C (04/23)   - Abdominal binder, teds   - Outpatient PCP F/U   - Hospitalist to manage meds     Pain  - Tylenol PRN  - Avoid sedating medications that may interfere with cognitive recovery    Sleep  - Maintain quiet hours and a low stim environment.   - Melatonin PRN     GI / Bowel  - Senna qHS--> D/C  - Miralax Daily--> Changed to PRN (04/20)   - bisacodyl prn q12h 5mg po--> D/C   - monitor for loose stools     / Bladder  Urinary Retention  - Currently patient voids: independently  - Bladder scans Q8 hours with straight cath for >400cc. Completed   - Required holden placement previously and TOV started 4/16  - Flomax 0.4mg qhs     Skin / Pressure injury  - Turn q2 hours in bed while awake, air mattress  - nursing to monitor skin qShift  - soft heel protectors  - skin barrier cream for stage 2 pressure wound right buttock 1x1cm daily    Diet/Dysphagia:  - Diet Consistency: DASH diet, regular consistency and thin liquids   - Supplements: B12 1000mcg daily, Vit D 25mcg daily  - Aspiration Precautions  - SLP treatment on going (30 minutes)    - Nutrition consult    DVT prophylaxis:   - Lovenox 30mg subq daily  - Last Doppler BLE on 4/10 negative for DVTs      Outpatient Follow-up:  Logan Farmerul  Neurosurgery  [address] [place] [id number]  Phone: [phone]  Fax: [phone]  Follow Up Time: 1

## 2025-04-29 NOTE — PROGRESS NOTE ADULT - SUBJECTIVE AND OBJECTIVE BOX
HPI:  Michell Neves is a 74 year-old female PMHx of HTN and Raynaud's phenomenon who presented to [facility] ED on 4/9 for dizziness, nausea, and vomiting followed by facial droop and right sided weakness w/ diplopia. Patient found to have SBP over 200mmHg and started on cardene gtt. CTH w/o contrast demonstrated acute pontine hemorrhage and was transferred to [facility] for neurosurgical evaluation. No surgical intervention required. Patient admitted to NSICU and was monitored and treated with antihypertensives. Patient with initial dysphagia but MBS performed and cleared for regular diet w/ thin fluids. Course complicated by urinary retention. TOV initiated on 4/16 and patient started on flomax for a course of at least 3 weeks. Chest pain earlier in admission but workup negative for cardiac original likely related to hypertensive emergency. Leukocytosis now resolved. CXR performed with small bilateral pleural effusions and atelectasis not treated with antibiotics. Patient seen by PT/OT and physiatry recommending acute inpatient rehabilitation. Patient discharged to [facility] on 4/18.     Allergies  No Known Allergies    Subjective:  - Patient was seen and examined at during PT, NAD  - Sleeping well at night, no events  - No pain  - (+) Diplopia and dizziness, Eye patch is off, improving per patient today  - Patient asking if she will discharge Friday. Will teams patient today  - LBM (04/27), voiding without issues. Continent x 2    - Tolerating therapy, motivated, reduced ST to 30 min, PT 90 min and OT 60 min   - Case discussed at IDT meeting today for progress and discharge plan  - Her goal is to get better with her walking and vision, go home, prefers breakfast prior to therapy   - (+) Stage II, dysarthria, memory deficits   - Pressure injury and stroke education provided  - TDD (05/02) home     ROS: - Denies CP, palpitation, SOB, cough, fever, chills, headache, abdominal pain, N/V/D/C, dysuria, hematuria, joint pain or swelling. (+) dizziness, visual changes    MEDICATIONS  (STANDING):  enoxaparin Injectable 30 milliGRAM(s) SubCutaneous every 24 hours  lisinopril 10 milliGRAM(s) Oral daily  lisinopril 20 milliGRAM(s) Oral at bedtime  zinc oxide 40% Paste 1 Application(s) Topical daily    MEDICATIONS  (PRN):  acetaminophen     Tablet .. 650 milliGRAM(s) Oral every 6 hours PRN Mild Pain (1 - 3)  melatonin 3 milliGRAM(s) Oral at bedtime PRN Insomnia    Recent Labs:                         12.6   7.85  )-----------( 336      ( 28 Apr 2025 06:04 )             39.0   04-28    135  |  99  |  23  ----------------------------<  73  4.0   |  28  |  0.67    Ca    9.2      28 Apr 2025 06:04    TPro  6.9  /  Alb  3.2[L]  /  TBili  0.3  /  DBili  x   /  AST  26  /  ALT  46[H]  /  AlkPhos  90  04-28        Physical Exam:  Vital Signs Last 24 Hrs  T(C): 36.4 (29 Apr 2025 08:57), Max: 36.7 (28 Apr 2025 20:07)  T(F): 97.5 (29 Apr 2025 08:57), Max: 98.1 (28 Apr 2025 20:07)  HR: 67 (29 Apr 2025 08:57) (67 - 84)  BP: 163/78 (29 Apr 2025 08:57) (118/70 - 163/78)  BP(mean): --  RR: 15 (29 Apr 2025 08:57) (15 - 16)  SpO2: 95% (29 Apr 2025 08:57) (95% - 97%)  Parameters below as of 29 Apr 2025 08:57  Patient On (Oxygen Delivery Method): room air    Gen - NAD, Comfortable  HEENT - NCAT, EOMI, PERRLA, Eye patch is off this am  Neck - Supple, No limited ROM  Pulm - CTAB, No crackles  Cardiovascular - RRR, S1S2   Abdomen - Soft, NT, ND   Extremities - No C/C/E, No calf tenderness  Neuro- AAOx 4, follows command. Mild dysarthria, Mild right sided facial droop, diplopia present, visual fields intact, left eye medially directed but EOM are intact, right hemiparesis. Decreased to light touch of bilateral forehead, right cheek, RUE and RLE--> Improving   Psychiatric - Mood stable, Affect WNL  Skin:  1x1 stage 2 pressure wound of the right buttock                               HPI:  Michell Neves is a 74 year-old female PMHx of HTN and Raynaud's phenomenon who presented to [facility] ED on 4/9 for dizziness, nausea, and vomiting followed by facial droop and right sided weakness w/ diplopia. Patient found to have SBP over 200mmHg and started on cardene gtt. CTH w/o contrast demonstrated acute pontine hemorrhage and was transferred to [facility] for neurosurgical evaluation. No surgical intervention required. Patient admitted to NSICU and was monitored and treated with antihypertensives. Patient with initial dysphagia but MBS performed and cleared for regular diet w/ thin fluids. Course complicated by urinary retention. TOV initiated on 4/16 and patient started on flomax for a course of at least 3 weeks. Chest pain earlier in admission but workup negative for cardiac original likely related to hypertensive emergency. Leukocytosis now resolved. CXR performed with small bilateral pleural effusions and atelectasis not treated with antibiotics. Patient seen by PT/OT and physiatry recommending acute inpatient rehabilitation. Patient discharged to [facility] on 4/18.     Allergies  No Known Allergies    Subjective:  - Patient was seen and examined at during PT, NAD  - Sleeping well at night, no events  - No pain  - (+) Diplopia and dizziness, Eye patch is off, improving per patient today-->  Vision is hazy but not double   - Patient asking if she will discharge Friday. Will teams patient today  - LBM (04/27), voiding without issues. Continent x 2    - Tolerating therapy, motivated, reduced ST to 30 min, PT 90 min and OT 60 min   - Her goal is to get better with her walking and vision, go home, prefers breakfast prior to therapy   - (+) Stage II, dysarthria, memory deficits   - Pressure injury and stroke education provided  - Case discussed at IDT meeting today for progress and discharge plan  - TDD (05/02) home     ROS: - Denies CP, palpitation, SOB, cough, fever, chills, headache, abdominal pain, N/V/D/C, dysuria, hematuria, joint pain or swelling. (+) dizziness, visual changes      MEDICATIONS  (STANDING):  enoxaparin Injectable 30 milliGRAM(s) SubCutaneous every 24 hours  lisinopril 10 milliGRAM(s) Oral daily  lisinopril 20 milliGRAM(s) Oral at bedtime  zinc oxide 40% Paste 1 Application(s) Topical daily    MEDICATIONS  (PRN):  acetaminophen     Tablet .. 650 milliGRAM(s) Oral every 6 hours PRN Mild Pain (1 - 3)  melatonin 3 milliGRAM(s) Oral at bedtime PRN Insomnia      Recent Labs:                         12.6   7.85  )-----------( 336      ( 28 Apr 2025 06:04 )             39.0   04-28    135  |  99  |  23  ----------------------------<  73  4.0   |  28  |  0.67    Ca    9.2      28 Apr 2025 06:04    TPro  6.9  /  Alb  3.2[L]  /  TBili  0.3  /  DBili  x   /  AST  26  /  ALT  46[H]  /  AlkPhos  90  04-28        Physical Exam:  Vital Signs Last 24 Hrs  T(C): 36.4 (29 Apr 2025 08:57), Max: 36.7 (28 Apr 2025 20:07)  T(F): 97.5 (29 Apr 2025 08:57), Max: 98.1 (28 Apr 2025 20:07)  HR: 67 (29 Apr 2025 08:57) (67 - 84)  BP: 163/78 (29 Apr 2025 08:57) (118/70 - 163/78)  RR: 15 (29 Apr 2025 08:57) (15 - 16)  SpO2: 95% (29 Apr 2025 08:57) (95% - 97%)  Parameters below as of 29 Apr 2025 08:57  Patient On (Oxygen Delivery Method): room air      Gen - NAD, Comfortable  HEENT - NCAT, EOMI, PERRLA, Eye patch is off this am  Neck - Supple, No limited ROM  Pulm - CTAB, No crackles  Cardiovascular - RRR, S1S2   Abdomen - Soft, NT, ND   Extremities - No C/C/E, No calf tenderness  Neuro- AAOx 4, follows command. Mild dysarthria, Mild right sided facial droop, diplopia present, visual fields intact, left eye medially directed but EOM are intact, right hemiparesis. Decreased to light touch of bilateral forehead, right cheek, RUE and RLE--> Improving   Psychiatric - Mood stable, Affect WNL  Skin:  1x1 stage 2 pressure wound of the right buttock

## 2025-04-29 NOTE — CHART NOTE - NSCHARTNOTEFT_GEN_A_CORE
Nutrition Follow Up Note  Hospital Course   (Per Electronic Medical Record)    Source:  Patient [X]  Medical Record [X]      Diet:   Low Sodium Diet w/ Thin Liquids (IDDSI Level 0)  Tolerates Diet Consistency Well  No Chewing/Swallowing Difficulties  No Recent Nausea, Vomiting or Constipation & Some Recent Diarrhea (as Per Patient)   Consumes % of Meals (as Per Documentation) - States Good PO Intake/Appetite (Per Patient)  on Ensure Plus High Protein 8oz PO Daily (Provides 350kcal & 20grams of Protein)  Declines Nutrition Supplementation - Recommend Discontinue (Per Patient Request)  Education Provided on Proper Nutrition     Enteral/Parenteral Nutrition: Not Applicable    Current Weight: 103.8lb on 4/18  Obtain New Weight  Obtain Weights Weekly     Pertinent Medications: MEDICATIONS  (STANDING):  enoxaparin Injectable 30 milliGRAM(s) SubCutaneous every 24 hours  hydrochlorothiazide 12.5 milliGRAM(s) Oral daily  lisinopril 10 milliGRAM(s) Oral two times a day  nystatin    Suspension 401216 Unit(s) Oral four times a day  senna 2 Tablet(s) Oral at bedtime  tamsulosin 0.4 milliGRAM(s) Oral at bedtime  zinc oxide 40% Paste 1 Application(s) Topical daily    MEDICATIONS  (PRN):  acetaminophen     Tablet .. 650 milliGRAM(s) Oral every 6 hours PRN Mild Pain (1 - 3)  bisacodyl 5 milliGRAM(s) Oral every 12 hours PRN Constipation  melatonin 3 milliGRAM(s) Oral at bedtime PRN Insomnia    Pertinent Labs:  04-21 Na135 mmol/L Glu 93 mg/dL K+ 4.3 mmol/L Cr  0.65 mg/dL BUN 22 mg/dL 04-17 Phos 3.4 mg/dL 04-21 Alb 3.1 g/dL[L] 04-09 Chol 211 mg/dL[H] LDL --    HDL 95 mg/dL Trig 51 mg/dL    Skin: Stage 2 Pressure Ulcer on Right Buttock     Edema: None Noted (as Per Documentation)     Last Bowel Movement: on 4/22    Estimated Needs:   [X] No Change Since Previous Assessment    Previous Nutrition Diagnosis:   Severe Malnutrition    Nutrition Diagnosis is [X] Ongoing - Declines Nutrition Supplementation - Recommend Discontinue (Per Patient Request)     New Nutrition Diagnosis: [X] Not Applicable    Interventions:   1. Recommend Discontinue Ensure Plus High Protein 8oz PO Daily  2. Education Provided on Proper Nutrition   3. Recommend Continue Nutrition Plan of Care     Monitoring & Evaluation:   [X] Weights   [X] PO Intake   [X] Skin Integrity   [X] Follow Up (Per Protocol)  [X] Tolerance to Diet Prescription   [X] Other: Labs     Registered Dietitian/Nutritionist Remains Available.  Nazario Gonzalez RDN, CDN  Senior Dietitian    Phone# [phone]
Nutrition Follow Up Note  Hospital Course   (Per Electronic Medical Record)    Source:  Patient [X]  Medical Record [X]      Diet:   Low Sodium Diet w/ Thin Liquids (IDDSI Level 0)  Tolerates Diet Consistency Well  No Chewing/Swallowing Difficulties  No Recent Nausea, Vomiting, Diarrhea or Constipation (as Per Patient)  Consumes % of Meals (as Per Documentation) - States Good PO Intake/Appetite (Per Patient)   Declines Nutrition Supplementation     Enteral/Parenteral Nutrition: Not Applicable    Current Weight: 103.8lb on 4/18  Obtain New Weight  Obtain Weights Weekly     Pertinent Medications: MEDICATIONS  (STANDING):  enoxaparin Injectable 30 milliGRAM(s) SubCutaneous every 24 hours  lisinopril 10 milliGRAM(s) Oral every 12 hours  zinc oxide 40% Paste 1 Application(s) Topical daily    MEDICATIONS  (PRN):  acetaminophen     Tablet .. 650 milliGRAM(s) Oral every 6 hours PRN Mild Pain (1 - 3)  melatonin 3 milliGRAM(s) Oral at bedtime PRN Insomnia    Pertinent Labs:  04-28 Na135 mmol/L Glu 73 mg/dL K+ 4.0 mmol/L Cr  0.67 mg/dL BUN 23 mg/dL 04-28 Alb 3.2 g/dL[L] 04-09 Chol 211 mg/dL[H] LDL --    HDL 95 mg/dL Trig 51 mg/dL    Skin: Stage 2 Pressure Ulcer on Right Buttock     Edema: None Noted (as Per Documentation)     Last Bowel Movement: on 4/27    Estimated Needs:   [X] No Change Since Previous Assessment    Previous Nutrition Diagnosis:   Severe Malnutrition    Nutrition Diagnosis is [X] Ongoing - Declines Nutrition Supplementation     New Nutrition Diagnosis: [X] Not Applicable    Interventions:   1. Recommend Continue Nutrition Plan of Care     Monitoring & Evaluation:   [X] Weights (as Needed/Available)  [X] PO Intake   [X] Skin Integrity   [X] Follow Up (Per Protocol)  [X] Tolerance to Diet Prescription   [X] Other: Labs     Registered Dietitian/Nutritionist Remains Available.  Nazario Gonzalez RDN, CDN  Senior Dietitian    Phone# [phone]

## 2025-04-29 NOTE — PROGRESS NOTE ADULT - SUBJECTIVE AND OBJECTIVE BOX
Patient is a 75y old  Female who presents with a chief complaint of Acute pontine intracranial hemorrhage (29 Apr 2025 09:10)    SUBJECTIVE / OVERNIGHT EVENTS:  seen and examined at bedside  Chart reviewed  No overnight events   bm last night. no c/o constipation  improving weakness with therapy. patient upper and lower extremity nearly equal in strength  verbalized goal for discharge on friday  uses eye patch when dizzy.     denied Chest pain/SOB/palpitation/abd pain/n/v/d/c/dysuria/flank pain/headaches/numbness/LOC. all other ROS neg    MEDICATIONS  (STANDING):  enoxaparin Injectable 30 milliGRAM(s) SubCutaneous every 24 hours  lisinopril 10 milliGRAM(s) Oral daily  lisinopril 20 milliGRAM(s) Oral at bedtime  zinc oxide 40% Paste 1 Application(s) Topical daily    MEDICATIONS  (PRN):  acetaminophen     Tablet .. 650 milliGRAM(s) Oral every 6 hours PRN Mild Pain (1 - 3)  melatonin 3 milliGRAM(s) Oral at bedtime PRN Insomnia    CAPILLARY BLOOD GLUCOSE      I&O's Summary      Vital Signs Last 24 Hrs  T(C): 36.4 (29 Apr 2025 08:57), Max: 36.7 (28 Apr 2025 20:07)  T(F): 97.5 (29 Apr 2025 08:57), Max: 98.1 (28 Apr 2025 20:07)  HR: 67 (29 Apr 2025 08:57) (67 - 84)  BP: 163/78 (29 Apr 2025 08:57) (118/70 - 163/78)  BP(mean): --  RR: 15 (29 Apr 2025 08:57) (15 - 16)  SpO2: 95% (29 Apr 2025 08:57) (95% - 97%)    Parameters below as of 29 Apr 2025 08:57  Patient On (Oxygen Delivery Method): room air    GENERAL: Patient is well groomed, no acute distress patient up in wheel chair having breakfast  HEENT:  Normocephalic and atraumatic.   NECK: Supple.  No JVD.    CARDIOVASCULAR: Regular Rate and Rhythm S1, S2. No murmur/rubs/gallop  LUNGS: Bilateral lungs clear to auscultation, no rales, no wheezing, no rhonchi.    ABDOMEN: No distention. Soft, non-tender, no guarding / rebound / rigidity. bowel sound normoactive. No  Costalvertebral  tenderness.    BACK: No spine tenderness.  EXTREMITIES: no cyanosis, no clubbing, no edema.   SKIN: no rash. No skin break or ulcer. No cellulitis.  NEUROLOGIC: A*O x4, .strength is symmetric, sensation intact, speech fluent.    PSYCHIATRIC: Calm.  No agitation. follows commands      CBC:     12.6   7.85  )-----------( 336      ( 04-28-25 @ 06:04 )             39.0         Chem:         ( 04-28-25 @ 06:04 )    135  |  99  |  23  ----------------------------<  73  4.0   |  28  |  0.67        Liver Functions: ( 04-28-25 @ 06:04 )  Alb: 3.2 g/dL / Pro: 6.9 g/dL / ALK PHOS: 90 U/L / ALT: 46 U/L / AST: 26 U/L / GGT: x              Type & Screen:     Urinalysis Basic - ( 28 Apr 2025 06:04 )    Color: x / Appearance: x / SG: x / pH: x  Gluc: 73 mg/dL / Ketone: x  / Bili: x / Urobili: x   Blood: x / Protein: x / Nitrite: x   Leuk Esterase: x / RBC: x / WBC x   Sq Epi: x / Non Sq Epi: x / Bacteria: x           Patient is a 75y old  Female who presents with a chief complaint of Acute pontine intracranial hemorrhage (29 Apr 2025 09:10)    SUBJECTIVE / OVERNIGHT EVENTS:  seen and examined at bedside  Chart reviewed  No overnight events   bm last night. no c/o constipation  improving weakness with therapy. patient upper and lower extremity nearly equal in strength  verbalized goal for discharge on friday  uses eye patch when dizzy.     denied Chest pain/SOB/palpitation/abd pain/n/v/d/c/dysuria/flank pain/headaches/numbness/LOC. all other ROS neg    MEDICATIONS  (STANDING):  enoxaparin Injectable 30 milliGRAM(s) SubCutaneous every 24 hours  lisinopril 10 milliGRAM(s) Oral daily  lisinopril 20 milliGRAM(s) Oral at bedtime  zinc oxide 40% Paste 1 Application(s) Topical daily    MEDICATIONS  (PRN):  acetaminophen     Tablet .. 650 milliGRAM(s) Oral every 6 hours PRN Mild Pain (1 - 3)  melatonin 3 milliGRAM(s) Oral at bedtime PRN Insomnia    CAPILLARY BLOOD GLUCOSE      I&O's Summary      Vital Signs Last 24 Hrs  T(C): 36.4 (29 Apr 2025 08:57), Max: 36.7 (28 Apr 2025 20:07)  T(F): 97.5 (29 Apr 2025 08:57), Max: 98.1 (28 Apr 2025 20:07)  HR: 67 (29 Apr 2025 08:57) (67 - 84)  BP: 163/78 (29 Apr 2025 08:57) (118/70 - 163/78)  BP(mean): --  RR: 15 (29 Apr 2025 08:57) (15 - 16)  SpO2: 95% (29 Apr 2025 08:57) (95% - 97%)    Parameters below as of 29 Apr 2025 08:57  Patient On (Oxygen Delivery Method): room air    GENERAL: Patient is well groomed, no acute distress patient up in wheel chair having breakfast  HEENT:  Normocephalic and atraumatic. diplopia improving  NECK: Supple.  No JVD.    CARDIOVASCULAR: Regular Rate and Rhythm S1, S2. No murmur/rubs/gallop  LUNGS: Bilateral lungs clear to auscultation, no rales, no wheezing, no rhonchi.    ABDOMEN: No distention. Soft, non-tender, no guarding / rebound / rigidity. bowel sound normoactive. No  Costalvertebral  tenderness.    BACK: No spine tenderness.  EXTREMITIES: no cyanosis, no clubbing, no edema.   SKIN: no rash. No skin break or ulcer. No cellulitis.  NEUROLOGIC: A*O x4, .strength is symmetric, sensation intact, speech fluent.    PSYCHIATRIC: Calm.  No agitation. follows commands      CBC:     12.6   7.85  )-----------( 336      ( 04-28-25 @ 06:04 )             39.0         Chem:         ( 04-28-25 @ 06:04 )    135  |  99  |  23  ----------------------------<  73  4.0   |  28  |  0.67        Liver Functions: ( 04-28-25 @ 06:04 )  Alb: 3.2 g/dL / Pro: 6.9 g/dL / ALK PHOS: 90 U/L / ALT: 46 U/L / AST: 26 U/L / GGT: x              Type & Screen:     Urinalysis Basic - ( 28 Apr 2025 06:04 )    Color: x / Appearance: x / SG: x / pH: x  Gluc: 73 mg/dL / Ketone: x  / Bili: x / Urobili: x   Blood: x / Protein: x / Nitrite: x   Leuk Esterase: x / RBC: x / WBC x   Sq Epi: x / Non Sq Epi: x / Bacteria: x

## 2025-04-29 NOTE — PROGRESS NOTE ADULT - SUBJECTIVE AND OBJECTIVE BOX
Medicine Progress Note    Patient is a 75y old  Female who presents with a chief complaint of Acute pontine intracranial hemorrhage (29 Apr 2025 09:10)      SUBJECTIVE / OVERNIGHT EVENTS:  no complaints    ROS:  denied fever/chills/CP/SOB/cough/palpitation/dizziness/abdominal pian/nausea/vomiting/diarrhoea/constipation/dysuria/leg or calf pain/headaches.all other ROS neg    MEDICATIONS  (STANDING):  enoxaparin Injectable 30 milliGRAM(s) SubCutaneous every 24 hours  lisinopril 10 milliGRAM(s) Oral daily  lisinopril 20 milliGRAM(s) Oral at bedtime  zinc oxide 40% Paste 1 Application(s) Topical daily    MEDICATIONS  (PRN):  acetaminophen     Tablet .. 650 milliGRAM(s) Oral every 6 hours PRN Mild Pain (1 - 3)  melatonin 3 milliGRAM(s) Oral at bedtime PRN Insomnia    CAPILLARY BLOOD GLUCOSE        I&O's Summary      PHYSICAL EXAM:    Vital Signs Last 24 Hrs  T(C): 36.4 (04-29-25 @ 08:57), Max: 36.7 (04-28-25 @ 20:07)  T(F): 97.5 (04-29-25 @ 08:57), Max: 98.1 (04-28-25 @ 20:07)  HR: 67 (04-29-25 @ 08:57) (67 - 84)  BP: 163/78 (04-29-25 @ 08:57) (118/70 - 163/78)  BP(mean): --  RR: 15 (04-29-25 @ 08:57) (15 - 16)  SpO2: 95% (04-29-25 @ 08:57) (95% - 97%)    Orthostatic VS during PT   04-29-25  Lying BP: 152/78 HR: 70  Sitting BP: 129/83 HR: 65  Standing BP: 126/82 HR: 70  Site: upper left arm  Mode: electronic    GENERAL: Not in distress. Alert    HEENT: clear conjuctiva, MMM. no pallor or icterus. no diplopia,  CARDIOVASCULAR: RRR S1, S2. No murmur/rubs/gallop  LUNGS: BLAE+, no rales, no wheezing, no rhonchi.    ABDOMEN: ND. Soft,  NT, no guarding / rebound / rigidity. BS normoactive  BACK: No spine tenderness.  EXTREMITIES: no edema. no leg or calf TP.  SKIN: warm and dry  PSYCHIATRIC: Calm.  No agitation.  CNS: AAO*3. RUE and RLE weakness and reduced sensation. mild dysarthria. mild right facial droop. follows commands      LABS:                        12.6   7.85  )-----------( 336      ( 28 Apr 2025 06:04 )             39.0     04-28    135  |  99  |  23  ----------------------------<  73  4.0   |  28  |  0.67    Ca    9.2      28 Apr 2025 06:04    TPro  6.9  /  Alb  3.2[L]  /  TBili  0.3  /  DBili  x   /  AST  26  /  ALT  46[H]  /  AlkPhos  90  04-28          Urinalysis Basic - ( 28 Apr 2025 06:04 )    Color: x / Appearance: x / SG: x / pH: x  Gluc: 73 mg/dL / Ketone: x  / Bili: x / Urobili: x   Blood: x / Protein: x / Nitrite: x   Leuk Esterase: x / RBC: x / WBC x   Sq Epi: x / Non Sq Epi: x / Bacteria: x        COVID-19 PCR: NotDetec (18 Apr 2025 21:00)      RADIOLOGY & ADDITIONAL TESTS:  Imaging from Last 24 Hours:    Electrocardiogram/QTc Interval:    COORDINATION OF CARE:  Care Discussed with Consultants/Other Providers:

## 2025-04-30 PROCEDURE — 99232 SBSQ HOSP IP/OBS MODERATE 35: CPT

## 2025-04-30 PROCEDURE — 99232 SBSQ HOSP IP/OBS MODERATE 35: CPT | Mod: GC

## 2025-04-30 RX ORDER — LISINOPRIL 5 MG/1
1 TABLET ORAL
Qty: 60 | Refills: 0
Start: 2025-04-30 | End: 2025-05-29

## 2025-04-30 RX ADMIN — TOUCHLESS CARE ZINC OXIDE PROTECTANT 1 APPLICATION(S): 20; 25 SPRAY TOPICAL at 06:02

## 2025-04-30 RX ADMIN — LISINOPRIL 10 MILLIGRAM(S): 5 TABLET ORAL at 06:24

## 2025-04-30 RX ADMIN — LISINOPRIL 10 MILLIGRAM(S): 5 TABLET ORAL at 17:30

## 2025-04-30 RX ADMIN — ENOXAPARIN SODIUM 30 MILLIGRAM(S): 100 INJECTION SUBCUTANEOUS at 06:01

## 2025-04-30 NOTE — PROGRESS NOTE ADULT - SUBJECTIVE AND OBJECTIVE BOX
HPI:  Michell Neves is a 74 year-old female PMHx of HTN and Raynaud's phenomenon who presented to [facility] ED on 4/9 for dizziness, nausea, and vomiting followed by facial droop and right sided weakness w/ diplopia. Patient found to have SBP over 200mmHg and started on cardene gtt. CTH w/o contrast demonstrated acute pontine hemorrhage and was transferred to [facility] for neurosurgical evaluation. No surgical intervention required. Patient admitted to NSICU and was monitored and treated with antihypertensives. Patient with initial dysphagia but MBS performed and cleared for regular diet w/ thin fluids. Course complicated by urinary retention. TOV initiated on 4/16 and patient started on flomax for a course of at least 3 weeks. Chest pain earlier in admission but workup negative for cardiac original likely related to hypertensive emergency. Leukocytosis now resolved. CXR performed with small bilateral pleural effusions and atelectasis not treated with antibiotics. Patient seen by PT/OT and physiatry recommending acute inpatient rehabilitation. Patient discharged to [facility] on 4/18.     Allergies  No Known Allergies    Subjective:  - Patient was seen and examined at bedside, NAD  - Sleeping well at night, no events  - No pain  - (+) Diplopia and dizziness, Eye patch is off, improving per patient today-->  Vision is hazy but not double   - LBM (04/29), voiding without issues. Continent x 2    - Tolerating therapy, motivated, reduced ST to 30 min, PT 90 min and OT 60 min   - Her goal is to get better with her walking and vision, go home, prefers breakfast prior to therapy   - (+) Stage II, dysarthria, memory deficits   - Pressure injury and stroke education provided  - TDD (05/02) home     ROS: - Denies CP, palpitation, SOB, cough, fever, chills, headache, abdominal pain, N/V/D/C, dysuria, hematuria, joint pain or swelling. (+) dizziness, visual changes      MEDICATIONS  (STANDING):  enoxaparin Injectable 30 milliGRAM(s) SubCutaneous every 24 hours  lisinopril 10 milliGRAM(s) Oral every 12 hours  zinc oxide 40% Paste 1 Application(s) Topical daily    MEDICATIONS  (PRN):  acetaminophen     Tablet .. 650 milliGRAM(s) Oral every 6 hours PRN Mild Pain (1 - 3)  melatonin 3 milliGRAM(s) Oral at bedtime PRN Insomnia      Recent Labs:                         12.6   7.85  )-----------( 336      ( 28 Apr 2025 06:04 )             39.0   04-28    135  |  99  |  23  ----------------------------<  73  4.0   |  28  |  0.67    Ca    9.2      28 Apr 2025 06:04    TPro  6.9  /  Alb  3.2[L]  /  TBili  0.3  /  DBili  x   /  AST  26  /  ALT  46[H]  /  AlkPhos  90  04-28        Physical Exam:  Vital Signs Last 24 Hrs  T(C): 37.1 (30 Apr 2025 09:12), Max: 37.1 (30 Apr 2025 09:12)  T(F): 98.7 (30 Apr 2025 09:12), Max: 98.7 (30 Apr 2025 09:12)  HR: 71 (30 Apr 2025 09:12) (69 - 74)  BP: 116/74 (30 Apr 2025 09:12) (111/71 - 148/86)  RR: 16 (30 Apr 2025 09:12) (15 - 16)  SpO2: 97% (30 Apr 2025 09:12) (97% - 98%)  Parameters below as of 30 Apr 2025 09:12  Patient On (Oxygen Delivery Method): room air      Gen - NAD, Comfortable  HEENT - NCAT, EOMI, PERRLA, Eye patch is off  Neck - Supple, No limited ROM  Pulm - CTAB, No crackles  Cardiovascular - RRR, S1S2   Abdomen - Soft, NT, ND   Extremities - No C/C/E, No calf tenderness  Neuro- AAOx 4, follows command. Mild dysarthria, Mild right sided facial droop, visual fields intact, left eye medially directed but EOM are intact, right hemiparesis. Decreased to light touch of bilateral forehead, right cheek, RUE and RLE--> Improving   Psychiatric - Mood stable, Affect WNL  Skin:  1x1 stage 2 pressure wound of the right buttock                               Flexible sigmoidoscopy

## 2025-04-30 NOTE — PROGRESS NOTE ADULT - ASSESSMENT
JANET BUSTILLOS is a 75y with HTN and Raynaud's phenomenon admitted to [facility] for 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  Hospital Course complicated by urinary retention, leukocytosis, and chest pain. Patient now admitted for a multidisciplinary rehab program.     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)    -imaging demonstrated acute left pontine hemorrhage in setting of hypertensive emergency likely due to long term uncontrolled hypertension  -initially managed with cardene gtt and adjusted to antihypertensive outlined below  -goal SBP under 160mmHg  -No PFO present on TTE  -evaluated by neurosurgical team and temporarily managed in NSICU - no surgical intervention required   - Keep bed head at 35 degrees all the times and 90 degrees with food out of bed  -dysphagia reevaluated with MBS and upgraded to regular diet with thin liquids  -approved for DVT chemoprophylaxis by neurosurgery   -Repeat MRI of the head in 6 weeks and outpatient neurosurgery follow up (05/21)   -deficits include right sided hemiparesis, facial droop, dysphagia, dysarthria  -Oral thrush present: nystatin mouthwash ordered 4 times a day  -Oral hygiene x 4 daily prior to Nystatin treatment  -Double vision--> Eye patch     Comprehensive Multidisciplinary Rehab Program:   -Comprehensive rehab program of PT/OT/SLP - 3 hours a day, 5 days a week.   -PT - 90 mins: strengthening, coordination, balance, endurance, gait,  -OT - 60 mins: strengthening, coordination, fine motor, ADLs  -SLP - 30 mins: dysphagia, dysarthria, memory impairment  _______________________________________________________________________________    CONCURRENT MEDICAL PROBLEMS    Transaminitis/ Improving   - AST/ALT level (04/19) 76/95, (04/21) 47/86, (04/24) 32/51, (04/28) 26/46  - Limit Tylenol use  - Monitor  - Hospitalist F/U      HTN  Orthostatic/ Improved   - goal BP under 160mmHg  -TTE performed: negative for PFO w/ EF 70-75% and mild aortic stenosis -> recommend cardiac follow up outpatient   - lisinopril 20mg daily--> changed to 10 mg daily (04/22)--> Changed to 10 mg po at am and 20 mg at pm (04/28), monitor orthostasis (+)--> Change to 10 mg po x 2 daily (04/29)   - HCTZ 25mg daily--> changed to 12.5 mg daily (04/22)--> D/C (04/23)   - Abdominal binder, teds  - Bp (04/30) 111/71 - 148/86   - Outpatient PCP F/U   - Hospitalist to manage meds     Pain  - Tylenol PRN  - Avoid sedating medications that may interfere with cognitive recovery    Sleep  - Maintain quiet hours and a low stim environment.   - Melatonin PRN     GI / Bowel  - Senna qHS--> D/C  - Miralax Daily--> Changed to PRN (04/20)   - bisacodyl prn q12h 5mg po--> D/C   - monitor for loose stools     / Bladder  Urinary Retention  - Currently patient voids: independently  - Bladder scans Q8 hours with straight cath for >400cc. Completed   - Required holden placement previously and TOV started 4/16  - Flomax 0.4mg qhs     Skin / Pressure injury  - Turn q2 hours in bed while awake, air mattress  - nursing to monitor skin qShift  - soft heel protectors  - skin barrier cream for stage 2 pressure wound right buttock 1x1cm daily    Diet/Dysphagia:  - Diet Consistency: DASH diet, regular consistency and thin liquids   - Supplements: B12 1000mcg daily, Vit D 25mcg daily  - Aspiration Precautions  - SLP treatment on going (30 minutes)    - Nutrition consult    DVT prophylaxis:   - Lovenox 30mg subq daily  - Last Doppler BLE on 4/10 negative for DVTs      Outpatient Follow-up:  Logan Farmerul  Neurosurgery  [address] [place] [id number]  Phone: [phone]  Fax: [phone]  Follow Up Time: 1

## 2025-04-30 NOTE — PROGRESS NOTE ADULT - ASSESSMENT
75y with HTN and Raynaud's phenomenon admitted to [facility] for 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  Hospital Course complicated by urinary retention, leukocytosis, and chest pain. Patient now admitted for a multidisciplinary rehab program.     Acute left dorsal pontine hemorrhage  Right hemiparesis: improving  Left eye diplopia: improved   -goal SBP goal now:  under 140mmHg [ normotension] unless otherwise instructed by neurology/neurosurgery  -No PFO present on TTE  -evaluated by neurosurgical team and temporarily managed in NSICU - no surgical intervention required   -approved for DVT chemoprophylaxis by neurosurgery   - comprehensive rehab  - fall, aspiration precautions   - requires repeat MRI of the head in 6 weeks and outpatient neurosurgery follow up    HTN  Orthostatic hypotension and dizziness  h/o Hypertensive emergency and chest pain: now resolved  - OH+ during PT 4/29> reduced Lisinopril dose back to 10 mg BID with holding. OH was neg with this regimen.   - lisinopril 10mg BID.--> increased to Lisinopril 10 mg in am and 20 mg at h/s 4/28  - Target BP under 140mmHg now  - TTE performed: negative for PFO w/ EF 70-75% and mild aortic stenosis   - HCTZ 12.5 mg daily - d/mercedes due to OH  - Educated patient about postural hypotension and fall prevention.   - patient was advised follow up with PCP in one week for repeat BP with positional changes  - encouraged PO hydration  - Cardiac follow up outpatient in 2 weeks post-dc     Hyponatremia:  improved    Transaminitis,  - Improving  - denies abdominal pain  - Limit Tylenol use  - Monitor      Acute Urinary Retention  - Improved  -Voiding well  Severe protein-calorie malnutrition.  - Nutrition is following    DVT prophylaxis:  Lovenox    d/w Dr. Correa, and rehab team

## 2025-05-01 LAB
ALBUMIN SERPL ELPH-MCNC: 3.1 G/DL — LOW (ref 3.3–5)
ALP SERPL-CCNC: 98 U/L — SIGNIFICANT CHANGE UP (ref 40–120)
ALT FLD-CCNC: 38 U/L — SIGNIFICANT CHANGE UP (ref 10–45)
ANION GAP SERPL CALC-SCNC: 7 MMOL/L — SIGNIFICANT CHANGE UP (ref 5–17)
AST SERPL-CCNC: 23 U/L — SIGNIFICANT CHANGE UP (ref 10–40)
BASOPHILS # BLD AUTO: 0.09 K/UL — SIGNIFICANT CHANGE UP (ref 0–0.2)
BASOPHILS NFR BLD AUTO: 1.1 % — SIGNIFICANT CHANGE UP (ref 0–2)
BILIRUB SERPL-MCNC: 0.3 MG/DL — SIGNIFICANT CHANGE UP (ref 0.2–1.2)
BUN SERPL-MCNC: 19 MG/DL — SIGNIFICANT CHANGE UP (ref 7–23)
CALCIUM SERPL-MCNC: 9.2 MG/DL — SIGNIFICANT CHANGE UP (ref 8.4–10.5)
CHLORIDE SERPL-SCNC: 102 MMOL/L — SIGNIFICANT CHANGE UP (ref 96–108)
CO2 SERPL-SCNC: 29 MMOL/L — SIGNIFICANT CHANGE UP (ref 22–31)
CREAT SERPL-MCNC: 0.56 MG/DL — SIGNIFICANT CHANGE UP (ref 0.5–1.3)
EGFR: 95 ML/MIN/1.73M2 — SIGNIFICANT CHANGE UP
EGFR: 95 ML/MIN/1.73M2 — SIGNIFICANT CHANGE UP
EOSINOPHIL # BLD AUTO: 0.36 K/UL — SIGNIFICANT CHANGE UP (ref 0–0.5)
EOSINOPHIL NFR BLD AUTO: 4.6 % — SIGNIFICANT CHANGE UP (ref 0–6)
GLUCOSE SERPL-MCNC: 79 MG/DL — SIGNIFICANT CHANGE UP (ref 70–99)
HCT VFR BLD CALC: 36.1 % — SIGNIFICANT CHANGE UP (ref 34.5–45)
HGB BLD-MCNC: 11.8 G/DL — SIGNIFICANT CHANGE UP (ref 11.5–15.5)
IMM GRANULOCYTES NFR BLD AUTO: 0.3 % — SIGNIFICANT CHANGE UP (ref 0–0.9)
LYMPHOCYTES # BLD AUTO: 1.41 K/UL — SIGNIFICANT CHANGE UP (ref 1–3.3)
LYMPHOCYTES # BLD AUTO: 18 % — SIGNIFICANT CHANGE UP (ref 13–44)
MCHC RBC-ENTMCNC: 30.1 PG — SIGNIFICANT CHANGE UP (ref 27–34)
MCHC RBC-ENTMCNC: 32.7 G/DL — SIGNIFICANT CHANGE UP (ref 32–36)
MCV RBC AUTO: 92.1 FL — SIGNIFICANT CHANGE UP (ref 80–100)
MONOCYTES # BLD AUTO: 0.88 K/UL — SIGNIFICANT CHANGE UP (ref 0–0.9)
MONOCYTES NFR BLD AUTO: 11.2 % — SIGNIFICANT CHANGE UP (ref 2–14)
NEUTROPHILS # BLD AUTO: 5.07 K/UL — SIGNIFICANT CHANGE UP (ref 1.8–7.4)
NEUTROPHILS NFR BLD AUTO: 64.8 % — SIGNIFICANT CHANGE UP (ref 43–77)
NRBC BLD AUTO-RTO: 0 /100 WBCS — SIGNIFICANT CHANGE UP (ref 0–0)
PLATELET # BLD AUTO: 315 K/UL — SIGNIFICANT CHANGE UP (ref 150–400)
POTASSIUM SERPL-MCNC: 4.3 MMOL/L — SIGNIFICANT CHANGE UP (ref 3.5–5.3)
POTASSIUM SERPL-SCNC: 4.3 MMOL/L — SIGNIFICANT CHANGE UP (ref 3.5–5.3)
PROT SERPL-MCNC: 6.5 G/DL — SIGNIFICANT CHANGE UP (ref 6–8.3)
RBC # BLD: 3.92 M/UL — SIGNIFICANT CHANGE UP (ref 3.8–5.2)
RBC # FLD: 14.3 % — SIGNIFICANT CHANGE UP (ref 10.3–14.5)
SODIUM SERPL-SCNC: 138 MMOL/L — SIGNIFICANT CHANGE UP (ref 135–145)
WBC # BLD: 7.83 K/UL — SIGNIFICANT CHANGE UP (ref 3.8–10.5)
WBC # FLD AUTO: 7.83 K/UL — SIGNIFICANT CHANGE UP (ref 3.8–10.5)

## 2025-05-01 PROCEDURE — 99232 SBSQ HOSP IP/OBS MODERATE 35: CPT | Mod: GC

## 2025-05-01 PROCEDURE — 99232 SBSQ HOSP IP/OBS MODERATE 35: CPT

## 2025-05-01 RX ADMIN — LISINOPRIL 10 MILLIGRAM(S): 5 TABLET ORAL at 06:28

## 2025-05-01 RX ADMIN — ENOXAPARIN SODIUM 30 MILLIGRAM(S): 100 INJECTION SUBCUTANEOUS at 06:28

## 2025-05-01 RX ADMIN — LISINOPRIL 10 MILLIGRAM(S): 5 TABLET ORAL at 17:44

## 2025-05-01 RX ADMIN — TOUCHLESS CARE ZINC OXIDE PROTECTANT 1 APPLICATION(S): 20; 25 SPRAY TOPICAL at 06:28

## 2025-05-01 NOTE — PROGRESS NOTE ADULT - ASSESSMENT
75y with HTN and Raynaud's phenomenon admitted to [facility] for 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  Hospital Course complicated by urinary retention, leukocytosis, and chest pain. Patient now admitted for a multidisciplinary rehab program.     #Acute left dorsal pontine hemorrhage  #Right hemiparesis: improving  #Left eye diplopia: improved   -goal SBP goal now:  under 140mmHg [ normotension] unless otherwise instructed by neurology/neurosurgery  -No PFO present on TTE  -evaluated by neurosurgical team and temporarily managed in NSICU - no surgical intervention required   -approved for DVT chemoprophylaxis by neurosurgery   - pt/ot   - requires repeat MRI of the head in 6 weeks and outpatient neurosurgery follow up    # HTN  # Orthostatic hypotension and dizziness  # h/o Hypertensive emergency and chest pain: now resolved  - OH+ during PT 4/29> reduced Lisinopril dose back to 10 mg BID with holding. OH was neg with this regimen.   - lisinopril 10mg BID.--> increased to Lisinopril 10 mg in am and 20 mg at h/s 4/28  - Target BP under 140mmHg now  - TTE performed: negative for PFO w/ EF 70-75% and mild aortic stenosis   - HCTZ 12.5 mg daily - d/mercedes due to OH  - Educated patient about postural hypotension and fall prevention.   - patient was advised follow up with PCP in one week for repeat BP with positional changes  - encouraged PO hydration  - Cardiac follow up outpatient in 2 weeks post-dc     # Hyponatremia:  improved    # Transaminitis, resolved   - Improving  - denies abdominal pain  - Limit Tylenol use  - Monitor      # DVT prophylaxis:  Lovenox    d/w rehab team

## 2025-05-01 NOTE — PROGRESS NOTE ADULT - SUBJECTIVE AND OBJECTIVE BOX
HPI:  Michell Neves is a 74 year-old female PMHx of HTN and Raynaud's phenomenon who presented to [facility] ED on 4/9 for dizziness, nausea, and vomiting followed by facial droop and right sided weakness w/ diplopia. Patient found to have SBP over 200mmHg and started on cardene gtt. CTH w/o contrast demonstrated acute pontine hemorrhage and was transferred to [facility] for neurosurgical evaluation. No surgical intervention required. Patient admitted to NSICU and was monitored and treated with antihypertensives. Patient with initial dysphagia but MBS performed and cleared for regular diet w/ thin fluids. Course complicated by urinary retention. TOV initiated on 4/16 and patient started on flomax for a course of at least 3 weeks. Chest pain earlier in admission but workup negative for cardiac original likely related to hypertensive emergency. Leukocytosis now resolved. CXR performed with small bilateral pleural effusions and atelectasis not treated with antibiotics. Patient seen by PT/OT and physiatry recommending acute inpatient rehabilitation. Patient discharged to [facility] on 4/18.     Allergies  No Known Allergies    Subjective:  - Patient was seen and examined at bedside, NAD  - Sleeping well at night, no events  - No pain  - (+) Diplopia and dizziness, Eye patch is off, improving per patient today-->  Vision is hazy but not double   - LBM (04/29), voiding without issues. Continent x 2    - Tolerating therapy, motivated, reduced ST to 30 min, PT 90 min and OT 60 min   - Her goal is to get better with her walking and vision, go home, prefers breakfast prior to therapy   - (+) Stage II, dysarthria, memory deficits   - Pressure injury and stroke education provided  - TDD (05/02) home     ROS: - Denies CP, palpitation, SOB, cough, fever, chills, headache, abdominal pain, N/V/D/C, dysuria, hematuria, joint pain or swelling. (+) dizziness, visual changes      MEDICATIONS  (STANDING):  enoxaparin Injectable 30 milliGRAM(s) SubCutaneous every 24 hours  lisinopril 10 milliGRAM(s) Oral every 12 hours  zinc oxide 40% Paste 1 Application(s) Topical daily    MEDICATIONS  (PRN):  acetaminophen     Tablet .. 650 milliGRAM(s) Oral every 6 hours PRN Mild Pain (1 - 3)  melatonin 3 milliGRAM(s) Oral at bedtime PRN Insomnia    Recent Labs:                         11.8   7.83  )-----------( 315      ( 01 May 2025 06:25 )             36.1   05-01    138  |  102  |  19  ----------------------------<  79  4.3   |  29  |  0.56    Ca    9.2      01 May 2025 06:25    TPro  6.5  /  Alb  3.1[L]  /  TBili  0.3  /  DBili  x   /  AST  23  /  ALT  38  /  AlkPhos  98  05-01                       12.6   7.85  )-----------( 336      ( 28 Apr 2025 06:04 )             39.0   04-28    135  |  99  |  23  ----------------------------<  73  4.0   |  28  |  0.67    Ca    9.2      28 Apr 2025 06:04    TPro  6.9  /  Alb  3.2[L]  /  TBili  0.3  /  DBili  x   /  AST  26  /  ALT  46[H]  /  AlkPhos  90  04-28        Physical Exam:  Vital Signs Last 24 Hrs  T(C): 36.5 (30 Apr 2025 19:28), Max: 37.1 (30 Apr 2025 09:12)  T(F): 97.7 (30 Apr 2025 19:28), Max: 98.7 (30 Apr 2025 09:12)  HR: 80 (01 May 2025 06:27) (71 - 87)  BP: 164/84 (01 May 2025 06:27) (116/74 - 164/84)  BP(mean): --  RR: 16 (01 May 2025 06:27) (16 - 16)  SpO2: 98% (01 May 2025 06:27) (95% - 98%)  Parameters below as of 01 May 2025 06:27  Patient On (Oxygen Delivery Method): room air    Gen - NAD, Comfortable  HEENT - NCAT, EOMI, PERRLA, Eye patch is off  Neck - Supple, No limited ROM  Pulm - CTAB, No crackles  Cardiovascular - RRR, S1S2   Abdomen - Soft, NT, ND   Extremities - No C/C/E, No calf tenderness  Neuro- AAOx 4, follows command. Mild dysarthria, Mild right sided facial droop, visual fields intact, left eye medially directed but EOM are intact, right hemiparesis. Decreased to light touch of bilateral forehead, right cheek, RUE and RLE--> Improving   Psychiatric - Mood stable, Affect WNL  Skin:  1x1 stage 2 pressure wound of the right buttock                               HPI:  Michell Neves is a 74 year-old female PMHx of HTN and Raynaud's phenomenon who presented to [facility] ED on 4/9 for dizziness, nausea, and vomiting followed by facial droop and right sided weakness w/ diplopia. Patient found to have SBP over 200mmHg and started on cardene gtt. CTH w/o contrast demonstrated acute pontine hemorrhage and was transferred to [facility] for neurosurgical evaluation. No surgical intervention required. Patient admitted to NSICU and was monitored and treated with antihypertensives. Patient with initial dysphagia but MBS performed and cleared for regular diet w/ thin fluids. Course complicated by urinary retention. TOV initiated on 4/16 and patient started on flomax for a course of at least 3 weeks. Chest pain earlier in admission but workup negative for cardiac original likely related to hypertensive emergency. Leukocytosis now resolved. CXR performed with small bilateral pleural effusions and atelectasis not treated with antibiotics. Patient seen by PT/OT and physiatry recommending acute inpatient rehabilitation. Patient discharged to [facility] on 4/18.     Allergies  No Known Allergies    Subjective:  - Patient was seen and examined at bedside, NAD  - Sleeping well at night, no events  - No pain  - (+) Diplopia and dizziness, Eye patch is off, improving per patient today-->  Better  - LBM (04/29), voiding without issues. Continent x 2    - Tolerating therapy, motivated, reduced ST to 30 min, PT 90 min and OT 60 min   - Her goal is to get better with her walking and vision, go home, prefers breakfast prior to therapy   - (+) Stage II, dysarthria, memory deficits   - Pressure injury and stroke education provided  - TDD (05/02) home     ROS: - Denies CP, palpitation, SOB, cough, fever, chills, headache, abdominal pain, N/V/D/C, dysuria, hematuria, joint pain or swelling. (+) dizziness, visual changes      MEDICATIONS  (STANDING):  enoxaparin Injectable 30 milliGRAM(s) SubCutaneous every 24 hours  lisinopril 10 milliGRAM(s) Oral every 12 hours  zinc oxide 40% Paste 1 Application(s) Topical daily    MEDICATIONS  (PRN):  acetaminophen     Tablet .. 650 milliGRAM(s) Oral every 6 hours PRN Mild Pain (1 - 3)  melatonin 3 milliGRAM(s) Oral at bedtime PRN Insomnia      Recent Labs:                         11.8   7.83  )-----------( 315      ( 01 May 2025 06:25 )             36.1   05-01    138  |  102  |  19  ----------------------------<  79  4.3   |  29  |  0.56    Ca    9.2      01 May 2025 06:25    TPro  6.5  /  Alb  3.1[L]  /  TBili  0.3  /  DBili  x   /  AST  23  /  ALT  38  /  AlkPhos  98  05-01                  Physical Exam:  Vital Signs Last 24 Hrs  T(C): 36.5 (30 Apr 2025 19:28), Max: 37.1 (30 Apr 2025 09:12)  T(F): 97.7 (30 Apr 2025 19:28), Max: 98.7 (30 Apr 2025 09:12)  HR: 80 (01 May 2025 06:27) (71 - 87)  BP: 164/84 (01 May 2025 06:27) (116/74 - 164/84)  RR: 16 (01 May 2025 06:27) (16 - 16)  SpO2: 98% (01 May 2025 06:27) (95% - 98%)  Parameters below as of 01 May 2025 06:27  Patient On (Oxygen Delivery Method): room air      Gen - NAD, Comfortable  HEENT - NCAT, EOMI, PERRLA, Eye patch is off  Neck - Supple, No limited ROM  Pulm - CTAB, No crackles  Cardiovascular - RRR, S1S2   Abdomen - Soft, NT, ND   Extremities - No C/C/E, No calf tenderness  Neuro- AAOx 4, follows command. Mild dysarthria, Mild right sided facial droop, visual fields intact, left eye medially directed but EOM are intact, right hemiparesis. Decreased to light touch of bilateral forehead, right cheek, RUE and RLE--> Improving   Psychiatric - Mood stable, Affect WNL  Skin:  1x1 stage 2 pressure wound of the right buttock

## 2025-05-01 NOTE — PROGRESS NOTE ADULT - ASSESSMENT
JANET BUSTILLOS is a 75y with HTN and Raynaud's phenomenon admitted to [facility] for 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  Hospital Course complicated by urinary retention, leukocytosis, and chest pain. Patient now admitted for a multidisciplinary rehab program.     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)    -imaging demonstrated acute left pontine hemorrhage in setting of hypertensive emergency likely due to long term uncontrolled hypertension  -initially managed with cardene gtt and adjusted to antihypertensive outlined below  -goal SBP under 160mmHg  -No PFO present on TTE  -evaluated by neurosurgical team and temporarily managed in NSICU - no surgical intervention required   - Keep bed head at 35 degrees all the times and 90 degrees with food out of bed  -dysphagia reevaluated with MBS and upgraded to regular diet with thin liquids  -approved for DVT chemoprophylaxis by neurosurgery   -Repeat MRI of the head in 6 weeks and outpatient neurosurgery follow up (05/21)   -deficits include right sided hemiparesis, facial droop, dysphagia, dysarthria  -Oral thrush present: nystatin mouthwash ordered 4 times a day  -Oral hygiene x 4 daily prior to Nystatin treatment  -Double vision--> Eye patch     Comprehensive Multidisciplinary Rehab Program:   -Comprehensive rehab program of PT/OT/SLP - 3 hours a day, 5 days a week.   -PT - 90 mins: strengthening, coordination, balance, endurance, gait,  -OT - 60 mins: strengthening, coordination, fine motor, ADLs  -SLP - 30 mins: dysphagia, dysarthria, memory impairment  _______________________________________________________________________________    CONCURRENT MEDICAL PROBLEMS    Transaminitis/ Improving   - AST/ALT level (04/19) 76/95, (04/21) 47/86, (04/24) 32/51, (04/28) 26/46  - Limit Tylenol use  - Monitor  - Hospitalist F/U      HTN  Orthostatic/ Improved   - goal BP under 160mmHg  -TTE performed: negative for PFO w/ EF 70-75% and mild aortic stenosis -> recommend cardiac follow up outpatient   - lisinopril 20mg daily--> changed to 10 mg daily (04/22)--> Changed to 10 mg po at am and 20 mg at pm (04/28), monitor orthostasis (+)--> Change to 10 mg po x 2 daily (04/29)   - HCTZ 25mg daily--> changed to 12.5 mg daily (04/22)--> D/C (04/23)   - Abdominal binder, teds  - Bp (05/01) 116/74 - 164/84  - Outpatient PCP F/U   - Hospitalist to manage meds     Pain  - Tylenol PRN  - Avoid sedating medications that may interfere with cognitive recovery    Sleep  - Maintain quiet hours and a low stim environment.   - Melatonin PRN     GI / Bowel  - Senna qHS--> D/C  - Miralax Daily--> Changed to PRN (04/20)   - bisacodyl prn q12h 5mg po--> D/C   - monitor for loose stools     / Bladder  Urinary Retention  - Currently patient voids: independently  - Bladder scans Q8 hours with straight cath for >400cc. Completed   - Required holden placement previously and TOV started 4/16  - Flomax 0.4mg qhs - d/c'd for orthostatic hypotension    Skin / Pressure injury  - Turn q2 hours in bed while awake, air mattress  - nursing to monitor skin qShift  - soft heel protectors  - skin barrier cream for stage 2 pressure wound right buttock 1x1cm daily    Diet/Dysphagia:  - Diet Consistency: DASH diet, regular consistency and thin liquids   - Supplements: B12 1000mcg daily, Vit D 25mcg daily  - Aspiration Precautions  - SLP treatment on going (30 minutes)    - Nutrition consult    DVT prophylaxis:   - Lovenox 30mg subq daily  - Last Doppler BLE on 4/10 negative for DVTs      Outpatient Follow-up:  Logan Farmerul  Neurosurgery  [address] [place] [id number]  Phone: [phone]  Fax: [phone]  Follow Up Time: 1    JANET BUSTILLOS is a 75y with HTN and Raynaud's phenomenon admitted to [facility] for 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  Hospital Course complicated by urinary retention, leukocytosis, and chest pain. Patient now admitted for a multidisciplinary rehab program.     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)    -imaging demonstrated acute left pontine hemorrhage in setting of hypertensive emergency likely due to long term uncontrolled hypertension  -initially managed with cardene gtt and adjusted to antihypertensive outlined below  -goal SBP under 160mmHg  -No PFO present on TTE  -evaluated by neurosurgical team and temporarily managed in NSICU - no surgical intervention required   - Keep bed head at 35 degrees all the times and 90 degrees with food out of bed  -dysphagia reevaluated with MBS and upgraded to regular diet with thin liquids  -approved for DVT chemoprophylaxis by neurosurgery   -Repeat MRI of the head in 6 weeks and outpatient neurosurgery follow up (05/21)   -deficits include right sided hemiparesis, facial droop, dysphagia, dysarthria  -Oral thrush present: nystatin mouthwash ordered 4 times a day  -Oral hygiene x 4 daily prior to Nystatin treatment  -Double vision--> Eye patch     Comprehensive Multidisciplinary Rehab Program:   -Comprehensive rehab program of PT/OT/SLP - 3 hours a day, 5 days a week.   -PT - 90 mins: strengthening, coordination, balance, endurance, gait,  -OT - 60 mins: strengthening, coordination, fine motor, ADLs  -SLP - 30 mins: dysphagia, dysarthria, memory impairment  _______________________________________________________________________________    CONCURRENT MEDICAL PROBLEMS    Transaminitis/ Improved   - AST/ALT level (04/19) 76/95, (04/21) 47/86, (04/24) 32/51, (04/28) 26/46, (05/01) 23/38  - Limit Tylenol use  - Monitor  - Hospitalist F/U      HTN  Orthostatic/ Improved   - goal BP under 160mmHg  -TTE performed: negative for PFO w/ EF 70-75% and mild aortic stenosis -> recommend cardiac follow up outpatient   - lisinopril 20mg daily--> changed to 10 mg daily (04/22)--> Changed to 10 mg po at am and 20 mg at pm (04/28), monitor orthostasis (+)--> Change to 10 mg po x 2 daily (04/29)   - HCTZ 25mg daily--> changed to 12.5 mg daily (04/22)--> D/C (04/23)   - Abdominal binder, teds  - Bp (05/01) 116/74 - 164/84  - Outpatient PCP F/U   - Hospitalist to manage meds     Pain  - Tylenol PRN  - Avoid sedating medications that may interfere with cognitive recovery    Sleep  - Maintain quiet hours and a low stim environment.   - Melatonin PRN     GI / Bowel  - Senna qHS--> D/C  - Miralax Daily--> Changed to PRN (04/20)   - bisacodyl prn q12h 5mg po--> D/C   - monitor for loose stools     / Bladder  Urinary Retention  - Currently patient voids: independently  - Bladder scans Q8 hours with straight cath for >400cc. Completed   - Required holden placement previously and TOV started 4/16  - Flomax 0.4mg qhs - d/c'd for orthostatic hypotension    Skin / Pressure injury  - Turn q2 hours in bed while awake, air mattress  - nursing to monitor skin qShift  - soft heel protectors  - skin barrier cream for stage 2 pressure wound right buttock 1x1cm daily    Diet/Dysphagia:  - Diet Consistency: DASH diet, regular consistency and thin liquids   - Supplements: B12 1000mcg daily, Vit D 25mcg daily  - Aspiration Precautions  - SLP treatment on going (30 minutes)    - Nutrition consult    DVT prophylaxis:   - Lovenox 30mg subq daily  - Last Doppler BLE on 4/10 negative for DVTs      Outpatient Follow-up:  Logan Farmerul  Neurosurgery  [address] [place] [id number]  Phone: [phone]  Fax: [phone]  Follow Up Time: 1

## 2025-05-01 NOTE — PROGRESS NOTE ADULT - SUBJECTIVE AND OBJECTIVE BOX
75y old  Female who presents with a chief complaint of Acute pontine intracranial hemorrhage     seen at the bedside, no new complaints, in good spirits.    Vital Signs Last 24 Hrs  T(C): 36.5 (30 Apr 2025 19:28), Max: 36.5 (30 Apr 2025 19:28)  T(F): 97.7 (30 Apr 2025 19:28), Max: 97.7 (30 Apr 2025 19:28)  HR: 80 (01 May 2025 06:27) (75 - 87)  BP: 164/84 (01 May 2025 06:27) (150/72 - 164/84)  BP(mean): --  RR: 16 (01 May 2025 06:27) (16 - 16)  SpO2: 98% (01 May 2025 06:27) (95% - 98%)    Parameters below as of 01 May 2025 06:27  Patient On (Oxygen Delivery Method): room air    GENERAL- NAD  EAR/NOSE/MOUTH/THROAT -  MMM  EYES- ROSE MARY, conjunctiva and Sclera clear  NECK- supple  RESPIRATORY-  clear to auscultation bilaterally  CARDIOVASCULAR - SIS2, RRR  GI - soft NT BS present  EXTREMITIES- no pedal edema  NEUROLOGY- no gross focal deficits  PSYCHIATRY- AAO X 3                    11.8                 138  | 29   | 19           7.83  >-----------< 315     ------------------------< 79                    36.1                 4.3  | 102  | 0.56                                         Ca 9.2   Mg x     Ph x        MEDICATIONS  (STANDING):  enoxaparin Injectable 30 milliGRAM(s) SubCutaneous every 24 hours  lisinopril 10 milliGRAM(s) Oral every 12 hours  zinc oxide 40% Paste 1 Application(s) Topical daily    MEDICATIONS  (PRN):  acetaminophen     Tablet .. 650 milliGRAM(s) Oral every 6 hours PRN Mild Pain (1 - 3)  melatonin 3 milliGRAM(s) Oral at bedtime PRN Insomnia

## 2025-05-02 VITALS — SYSTOLIC BLOOD PRESSURE: 149 MMHG | DIASTOLIC BLOOD PRESSURE: 76 MMHG | HEART RATE: 81 BPM

## 2025-05-02 PROCEDURE — 99239 HOSP IP/OBS DSCHRG MGMT >30: CPT | Mod: GC

## 2025-05-02 PROCEDURE — 99232 SBSQ HOSP IP/OBS MODERATE 35: CPT

## 2025-05-02 RX ADMIN — LISINOPRIL 10 MILLIGRAM(S): 5 TABLET ORAL at 05:27

## 2025-05-02 RX ADMIN — ENOXAPARIN SODIUM 30 MILLIGRAM(S): 100 INJECTION SUBCUTANEOUS at 05:26

## 2025-05-02 RX ADMIN — TOUCHLESS CARE ZINC OXIDE PROTECTANT 1 APPLICATION(S): 20; 25 SPRAY TOPICAL at 05:28

## 2025-05-02 NOTE — PROGRESS NOTE ADULT - NUTRITIONAL ASSESSMENT
This patient has been assessed with a concern for Malnutrition and has been determined to have a diagnosis/diagnoses of Severe protein-calorie malnutrition.    This patient is being managed with:   Diet Regular-  Low Sodium  Entered: Apr 23 2025  9:33AM  
This patient has been assessed with a concern for Malnutrition and has been determined to have a diagnosis/diagnoses of Severe protein-calorie malnutrition.    This patient is being managed with:   Diet Regular-  Low Sodium  Supplement Feeding Modality:  Oral  Ensure Plus High Protein Cans or Servings Per Day:  1       Frequency:  Daily  Entered: Apr 19 2025 10:52AM    The following pending diet order is being considered for treatment of Severe protein-calorie malnutrition:  Diet Regular-  Low Sodium  Entered: Apr 23 2025  9:33AM  
This patient has been assessed with a concern for Malnutrition and has been determined to have a diagnosis/diagnoses of Severe protein-calorie malnutrition.    This patient is being managed with:   Diet Regular-  Low Sodium  Entered: Apr 23 2025  9:33AM  
This patient has been assessed with a concern for Malnutrition and has been determined to have a diagnosis/diagnoses of Severe protein-calorie malnutrition.    This patient is being managed with:   Diet Regular-  Low Sodium  Supplement Feeding Modality:  Oral  Ensure Plus High Protein Cans or Servings Per Day:  1       Frequency:  Daily  Entered: Apr 19 2025 10:52AM  
This patient has been assessed with a concern for Malnutrition and has been determined to have a diagnosis/diagnoses of Severe protein-calorie malnutrition.    This patient is being managed with:   Diet Regular-  Low Sodium  Supplement Feeding Modality:  Oral  Ensure Plus High Protein Cans or Servings Per Day:  1       Frequency:  Daily  Entered: Apr 19 2025 10:52AM    The following pending diet order is being considered for treatment of Severe protein-calorie malnutrition:  Diet Regular-  Low Sodium  Entered: Apr 23 2025  9:33AM  
This patient has been assessed with a concern for Malnutrition and has been determined to have a diagnosis/diagnoses of Severe protein-calorie malnutrition.    This patient is being managed with:   Diet Regular-  Low Sodium  Entered: Apr 23 2025  9:33AM  
This patient has been assessed with a concern for Malnutrition and has been determined to have a diagnosis/diagnoses of Severe protein-calorie malnutrition.    This patient is being managed with:   Diet Regular-  Low Sodium  Entered: Apr 23 2025  9:33AM  
This patient has been assessed with a concern for Malnutrition and has been determined to have a diagnosis/diagnoses of Severe protein-calorie malnutrition.    This patient is being managed with:   Diet Regular-  Low Sodium  Supplement Feeding Modality:  Oral  Ensure Plus High Protein Cans or Servings Per Day:  1       Frequency:  Daily  Entered: Apr 19 2025 10:52AM    The following pending diet order is being considered for treatment of Severe protein-calorie malnutrition:  Diet Regular-  Low Sodium  Entered: Apr 23 2025  9:33AM  
This patient has been assessed with a concern for Malnutrition and has been determined to have a diagnosis/diagnoses of Severe protein-calorie malnutrition.    This patient is being managed with:   Diet Regular-  Low Sodium  Entered: Apr 23 2025  9:33AM  
This patient has been assessed with a concern for Malnutrition and has been determined to have a diagnosis/diagnoses of Severe protein-calorie malnutrition.    This patient is being managed with:   Diet Regular-  Low Sodium  Supplement Feeding Modality:  Oral  Ensure Plus High Protein Cans or Servings Per Day:  1       Frequency:  Daily  Entered: Apr 19 2025 10:52AM  
This patient has been assessed with a concern for Malnutrition and has been determined to have a diagnosis/diagnoses of Severe protein-calorie malnutrition.    This patient is being managed with:   Diet Regular-  Low Sodium  Supplement Feeding Modality:  Oral  Ensure Plus High Protein Cans or Servings Per Day:  1       Frequency:  Daily  Entered: Apr 19 2025 10:52AM

## 2025-05-02 NOTE — PROGRESS NOTE ADULT - ASSESSMENT
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  Hospital Course complicated by urinary retention, leukocytosis, and chest pain. Patient now admitted for a multidisciplinary rehab program.     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)    -imaging demonstrated acute left pontine hemorrhage in setting of hypertensive emergency likely due to long term uncontrolled hypertension  -initially managed with cardene gtt and adjusted to antihypertensive outlined below  -goal SBP under 160mmHg  -No PFO present on TTE  -evaluated by neurosurgical team and temporarily managed in NSICU - no surgical intervention required   - Keep bed head at 35 degrees all the times and 90 degrees with food out of bed  -dysphagia reevaluated with MBS and upgraded to regular diet with thin liquids  -approved for DVT chemoprophylaxis by neurosurgery   -Repeat MRI of the head in 6 weeks and outpatient neurosurgery follow up (05/21)   -deficits include right sided hemiparesis, facial droop, dysphagia, dysarthria  -Oral thrush present: nystatin mouthwash ordered 4 times a day  -Oral hygiene x 4 daily prior to Nystatin treatment  -Double vision--> Eye patch     Comprehensive Multidisciplinary Rehab Program:   -Comprehensive rehab program of PT/OT/SLP - 3 hours a day, 5 days a week.   -PT - 90 mins: strengthening, coordination, balance, endurance, gait,  -OT - 60 mins: strengthening, coordination, fine motor, ADLs  -SLP - 30 mins: dysphagia, dysarthria, memory impairment  _______________________________________________________________________________    CONCURRENT MEDICAL PROBLEMS    Transaminitis/ Improved   - AST/ALT level (04/19) 76/95, (04/21) 47/86, (04/24) 32/51, (04/28) 26/46, (05/01) 23/38  - Limit Tylenol use  - Monitor  - Hospitalist F/U      HTN  Orthostatic/ Improved   - goal BP under 160mmHg  -TTE performed: negative for PFO w/ EF 70-75% and mild aortic stenosis -> recommend cardiac follow up outpatient   - lisinopril 20mg daily--> changed to 10 mg daily (04/22)--> Changed to 10 mg po at am and 20 mg at pm (04/28), monitor orthostasis (+)--> Changed to 10 mg po x 2 daily (04/29)   - HCTZ 25mg daily--> changed to 12.5 mg daily (04/22)--> D/C (04/23)   - Abdominal binder, teds  - Bp (05/01) 116/74 - 164/84  - Outpatient PCP F/U   - Hospitalist to manage meds     Pain  - Tylenol PRN  - Avoid sedating medications that may interfere with cognitive recovery    Sleep  - Maintain quiet hours and a low stim environment.   - Melatonin PRN     GI / Bowel  - Senna qHS--> D/C  - Miralax Daily--> Changed to PRN (04/20)   - bisacodyl prn q12h 5mg po--> D/C   - monitor for loose stools     / Bladder  Urinary Retention  - Currently patient voids: independently  - Bladder scans Q8 hours with straight cath for >400cc. Completed   - Required holden placement previously and TOV started 4/16  - Flomax 0.4mg qhs - d/c'd for orthostatic hypotension    Skin / Pressure injury  - Turn q2 hours in bed while awake, air mattress  - nursing to monitor skin qShift  - soft heel protectors  - skin barrier cream for stage 2 pressure wound right buttock 1x1cm daily    Diet/Dysphagia:  - Diet Consistency: DASH diet, regular consistency and thin liquids   - Supplements: B12 1000mcg daily, Vit D 25mcg daily  - Aspiration Precautions  - SLP treatment on going (30 minutes)    - Nutrition consult    DVT prophylaxis:   - Lovenox 30mg subq daily  - Last Doppler BLE on 4/10 negative for DVTs      Outpatient Follow-up:  Logan Farmerul  Neurosurgery  [address] [place] [id number]  Phone: [phone]  Fax: [phone]  Follow Up Time: 1    JANET BUSTILLOS is a 75y with HTN and Raynaud's phenomenon admitted to [facility] for 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  Hospital Course complicated by urinary retention, leukocytosis, and chest pain. Patient now admitted for a multidisciplinary rehab program.     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)    -imaging demonstrated acute left pontine hemorrhage in setting of hypertensive emergency likely due to long term uncontrolled hypertension  -initially managed with cardene gtt and adjusted to antihypertensive outlined below  -goal SBP under 160mmHg  -No PFO present on TTE  -evaluated by neurosurgical team and temporarily managed in NSICU - no surgical intervention required   - Keep bed head at 35 degrees all the times and 90 degrees with food out of bed  -dysphagia reevaluated with MBS and upgraded to regular diet with thin liquids  -approved for DVT chemoprophylaxis by neurosurgery   -Repeat MRI of the head in 6 weeks and outpatient neurosurgery follow up (05/21)   -deficits include right sided hemiparesis, facial droop, dysphagia, dysarthria  -Oral thrush present: nystatin mouthwash ordered 4 times a day  -Oral hygiene x 4 daily prior to Nystatin treatment  -Double vision--> Eye patch     Comprehensive Multidisciplinary Rehab Program--> To be continued after discharge   -Comprehensive rehab program of PT/OT/SLP - 3 hours a day, 5 days a week.   -PT - 90 mins: strengthening, coordination, balance, endurance, gait,  -OT - 60 mins: strengthening, coordination, fine motor, ADLs  -SLP - 30 mins: dysphagia, dysarthria, memory impairment  _______________________________________________________________________________    CONCURRENT MEDICAL PROBLEMS    Transaminitis/ Improved   - AST/ALT level (04/19) 76/95, (04/21) 47/86, (04/24) 32/51, (04/28) 26/46, (05/01) 23/38  - Limit Tylenol use  - Monitor  - Hospitalist F/U      HTN  Orthostatic/ Improved   - goal BP under 160mmHg  -TTE performed: negative for PFO w/ EF 70-75% and mild aortic stenosis -> recommend cardiac follow up outpatient   - lisinopril 20mg daily--> changed to 10 mg daily (04/22)--> Changed to 10 mg po at am and 20 mg at pm (04/28), monitor orthostasis (+)--> Changed to 10 mg po x 2 daily (04/29)   - HCTZ 25mg daily--> changed to 12.5 mg daily (04/22)--> D/C (04/23)   - Abdominal binder, teds  - Outpatient PCP F/U     Pain  - Tylenol PRN  - Avoid sedating medications that may interfere with cognitive recovery    Sleep  - Maintain quiet hours and a low stim environment.   - Melatonin PRN     GI / Bowel  - Senna qHS--> D/C  - Miralax Daily--> Changed to PRN (04/20)   - bisacodyl prn q12h 5mg po--> D/C   - monitor for loose stools     / Bladder  Urinary Retention  - Currently patient voids: independently  - Bladder scans Q8 hours with straight cath for >400cc. Completed   - Required holden placement previously and TOV started 4/16  - Flomax 0.4mg qhs - d/c'd for orthostatic hypotension    Skin / Pressure injury  - Turn q2 hours in bed while awake, air mattress  - nursing to monitor skin qShift  - soft heel protectors  - skin barrier cream for stage 2 pressure wound right buttock 1x1cm daily    Diet/Dysphagia:  - Diet Consistency: DASH diet, regular consistency and thin liquids   - Supplements: B12 1000mcg daily, Vit D 25mcg daily  - Aspiration Precautions  - SLP treatment on going (30 minutes)      DVT prophylaxis:   - Lovenox 30mg subq daily--> D/C upon discharge   - Last Doppler BLE on 4/10 negative for DVTs      Outpatient Follow-up:  Logan Farmerul  Neurosurgery  [address] [place] [id number]  Phone: [phone]  Fax: [phone]  Follow Up Time: 1

## 2025-05-02 NOTE — PROGRESS NOTE ADULT - ATTENDING COMMENTS
I independently performed the documented the history, exam, and medical decision making. I have made amendments to the documentation where necessary. I have personally seen and examined the patient. Medical records were reviewed and I have made amendments to the documentation where necessary and adjusted the history, physical examination, and plan as documented by the Resident Physician.
75y with HTN and Raynaud's phenomenon admitted to [facility] for 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  Hospital Course complicated by urinary retention, leukocytosis, and chest pain. Patient now admitted for a multidisciplinary rehab program.     Seen and examined. Chart reviewed.   patient is improving clinically.   Agree with above a/p  Edited where ever is necessary    seen and examined  Chart reviewed  No overnight events  Limb weakness improving with therapy  BM+  No pain  No complaints except right sided weakness and diplopia.       ROS:  denied fever/chills/CP/SOB/cough/palpitation/dizziness/abdominal pian/nausea/vomiting/diarrhoea/constipation/dysuria/leg or calf pain/headaches. all other ROS neg    VS, labs and imaging reviewed.     A/p:   Acute left dorsal pontine hemorrhage   -goal SBP under 160mmHg  -No PFO present on TTE  -evaluated by neurosurgical team and temporarily managed in NSICU - no surgical intervention required   -approved for DVT chemoprophylaxis by neurosurgery   -requires repeat MRI of the head in 6 weeks and outpatient neurosurgery follow up  - comprehensive rehab  - fall, aspiration precautions     Hyponatremia:  improved    Transaminitis,  - denies abdominal pain  - Limit Tylenol use  - Monitor    HTN  - Target BP under 160mmHg  -TTE performed: negative for PFO w/ EF 70-75% and mild aortic stenosis   - lisinopril 10mg daily  - HCTZ 12.5 mg daily - d/mercedes   - Cardiac follow up outpatient post-dc    Acute Urinary Retention  - Improved  -Voiding well    DVT prophylaxis:  Lovenox    d/w IDR team and NP student Remington
I independently performed the documented the history, exam, and medical decision making. I have made amendments to the documentation where necessary. I have personally seen and examined the patient. Medical records were reviewed and I have made amendments to the documentation where necessary and adjusted the history, physical examination, and plan as documented by the Resident Physician.
I independently performed the documented the history, exam, and medical decision making. I have made amendments to the documentation where necessary. I have personally seen and examined the patient. Medical records were reviewed and I have made amendments to the documentation where necessary and adjusted the history, physical examination, and plan as documented by the Resident Physician.
Seen and examined. Chart reviewed.   patient is improving clinically.   Agree with above a/p  Edited where ever is necessary    please see separate attending note.
I independently performed the documented the history, exam, and medical decision making. I have made amendments to the documentation where necessary. I have personally seen and examined the patient. Medical records were reviewed and I have made amendments to the documentation where necessary and adjusted the history, physical examination, and plan as documented by the Resident Physician.
I independently performed the documented the history, exam, and medical decision making. I have made amendments to the documentation where necessary. I have personally seen and examined the patient. Medical records were reviewed and I have made amendments to the documentation where necessary and adjusted the history, physical examination, and plan as documented by the Resident Physician and Medical student

## 2025-05-02 NOTE — PROGRESS NOTE ADULT - REASON FOR ADMISSION
Acute pontine intracranial hemorrhage

## 2025-05-02 NOTE — PROGRESS NOTE ADULT - PROVIDER SPECIALTY LIST ADULT
Hospitalist
Physiatry
Hospitalist
Physiatry
Physiatry
Hospitalist
Physiatry
Hospitalist
Physiatry
Physiatry

## 2025-05-02 NOTE — PROGRESS NOTE ADULT - SUBJECTIVE AND OBJECTIVE BOX
HPI:  Michell Neves is a 74 year-old female PMHx of HTN and Raynaud's phenomenon who presented to [facility] ED on 4/9 for dizziness, nausea, and vomiting followed by facial droop and right sided weakness w/ diplopia. Patient found to have SBP over 200mmHg and started on cardene gtt. CTH w/o contrast demonstrated acute pontine hemorrhage and was transferred to [facility] for neurosurgical evaluation. No surgical intervention required. Patient admitted to NSICU and was monitored and treated with antihypertensives. Patient with initial dysphagia but MBS performed and cleared for regular diet w/ thin fluids. Course complicated by urinary retention. TOV initiated on 4/16 and patient started on flomax for a course of at least 3 weeks. Chest pain earlier in admission but workup negative for cardiac original likely related to hypertensive emergency. Leukocytosis now resolved. CXR performed with small bilateral pleural effusions and atelectasis not treated with antibiotics. Patient seen by PT/OT and physiatry recommending acute inpatient rehabilitation. Patient discharged to [facility] on 4/18.     Allergies  No Known Allergies    Subjective:  - Patient was seen and examined at bedside, NAD  - Sleeping well at night, no events  - No pain  - (+) Diplopia and dizziness, Eye patch is off, improving per patient today-->  Better  - LBM (05/01), voiding without issues. Continent x 2    - Tolerating therapy, motivated, reduced ST to 30 min, PT 90 min and OT 60 min   - Her goal is to get better with her walking and vision, go home, prefers breakfast prior to therapy   - (+) Stage II, dysarthria, memory deficits   - Pressure injury and stroke education provided  - TDD (05/02) home     ROS: - Denies CP, palpitation, SOB, cough, fever, chills, headache, abdominal pain, N/V/D/C, dysuria, hematuria, joint pain or swelling. (+) dizziness, visual changes    MEDICATIONS  (STANDING):  enoxaparin Injectable 30 milliGRAM(s) SubCutaneous every 24 hours  lisinopril 10 milliGRAM(s) Oral every 12 hours  zinc oxide 40% Paste 1 Application(s) Topical daily    MEDICATIONS  (PRN):  acetaminophen     Tablet .. 650 milliGRAM(s) Oral every 6 hours PRN Mild Pain (1 - 3)  melatonin 3 milliGRAM(s) Oral at bedtime PRN Insomnia      Recent Labs:                         11.8   7.83  )-----------( 315      ( 01 May 2025 06:25 )             36.1   05-01    138  |  102  |  19  ----------------------------<  79  4.3   |  29  |  0.56    Ca    9.2      01 May 2025 06:25    TPro  6.5  /  Alb  3.1[L]  /  TBili  0.3  /  DBili  x   /  AST  23  /  ALT  38  /  AlkPhos  98  05-01                  Physical Exam:  Vital Signs Last 24 Hrs  T(C): 36.4 (01 May 2025 19:58), Max: 36.4 (01 May 2025 19:58)  T(F): 97.5 (01 May 2025 19:58), Max: 97.5 (01 May 2025 19:58)  HR: 81 (02 May 2025 05:26) (81 - 82)  BP: 149/76 (02 May 2025 05:26) (147/74 - 149/76)  BP(mean): --  RR: 16 (01 May 2025 19:58) (16 - 16)  SpO2: 96% (01 May 2025 19:58) (96% - 96%)  Parameters below as of 01 May 2025 19:58  Patient On (Oxygen Delivery Method): room air    Gen - NAD, Comfortable  HEENT - NCAT, EOMI, PERRLA, Eye patch is off  Neck - Supple, No limited ROM  Pulm - CTAB, No crackles  Cardiovascular - RRR, S1S2   Abdomen - Soft, NT, ND   Extremities - No C/C/E, No calf tenderness  Neuro- AAOx 4, follows command. Mild dysarthria, Mild right sided facial droop, visual fields intact, left eye medially directed but EOM are intact, right hemiparesis. Decreased to light touch of bilateral forehead, right cheek, RUE and RLE--> Improving   Psychiatric - Mood stable, Affect WNL  Skin:  1x1 stage 2 pressure wound of the right buttock                               HPI:  Michell Neves is a 74 year-old female PMHx of HTN and Raynaud's phenomenon who presented to [facility] ED on 4/9 for dizziness, nausea, and vomiting followed by facial droop and right sided weakness w/ diplopia. Patient found to have SBP over 200mmHg and started on cardene gtt. CTH w/o contrast demonstrated acute pontine hemorrhage and was transferred to [facility] for neurosurgical evaluation. No surgical intervention required. Patient admitted to NSICU and was monitored and treated with antihypertensives. Patient with initial dysphagia but MBS performed and cleared for regular diet w/ thin fluids. Course complicated by urinary retention. TOV initiated on 4/16 and patient started on flomax for a course of at least 3 weeks. Chest pain earlier in admission but workup negative for cardiac original likely related to hypertensive emergency. Leukocytosis now resolved. CXR performed with small bilateral pleural effusions and atelectasis not treated with antibiotics. Patient seen by PT/OT and physiatry recommending acute inpatient rehabilitation. Patient discharged to [facility] on 4/18.     Allergies  No Known Allergies    Subjective:  - Patient was seen and examined at bedside, NAD  - Sleeping well at night, no events  - No pain  - (+) Diplopia and dizziness, Eye patch is off, improving per patient today-->  Better  - LBM (05/01), voiding without issues. Continent x 2    - Tolerating therapy, motivated, reduced ST to 30 min, PT 90 min and OT 60 min   - Her goal is to get better with her walking and vision, go home, prefers breakfast prior to therapy   - (+) Stage II, dysarthria, memory deficits   - Pressure injury and stroke education provided  - TDD (05/02) home, happy with progress and discharge     ROS: - Denies CP, palpitation, SOB, cough, fever, chills, headache, abdominal pain, N/V/D/C, dysuria, hematuria, joint pain or swelling. (+) dizziness, visual changes    MEDICATIONS  (STANDING):  enoxaparin Injectable 30 milliGRAM(s) SubCutaneous every 24 hours  lisinopril 10 milliGRAM(s) Oral every 12 hours  zinc oxide 40% Paste 1 Application(s) Topical daily    MEDICATIONS  (PRN):  acetaminophen     Tablet .. 650 milliGRAM(s) Oral every 6 hours PRN Mild Pain (1 - 3)  melatonin 3 milliGRAM(s) Oral at bedtime PRN Insomnia      Recent Labs:                         11.8   7.83  )-----------( 315      ( 01 May 2025 06:25 )             36.1   05-01    138  |  102  |  19  ----------------------------<  79  4.3   |  29  |  0.56    Ca    9.2      01 May 2025 06:25    TPro  6.5  /  Alb  3.1[L]  /  TBili  0.3  /  DBili  x   /  AST  23  /  ALT  38  /  AlkPhos  98  05-01                  Physical Exam:  Vital Signs Last 24 Hrs  T(C): 36.4 (01 May 2025 19:58), Max: 36.4 (01 May 2025 19:58)  T(F): 97.5 (01 May 2025 19:58), Max: 97.5 (01 May 2025 19:58)  HR: 81 (02 May 2025 05:26) (81 - 82)  BP: 149/76 (02 May 2025 05:26) (147/74 - 149/76)  RR: 16 (01 May 2025 19:58) (16 - 16)  SpO2: 96% (01 May 2025 19:58) (96% - 96%)  Parameters below as of 01 May 2025 19:58  Patient On (Oxygen Delivery Method): room air      Gen - NAD, Comfortable  HEENT - NCAT, EOMI, PERRLA, Eye patch is off  Neck - Supple, No limited ROM  Pulm - CTAB, No crackles  Cardiovascular - RRR, S1S2   Abdomen - Soft, NT, ND   Extremities - No C/C/E, No calf tenderness  Neuro- AAOx 4, follows command. Mild dysarthria, Mild right sided facial droop, visual fields intact, left eye medially directed but EOM are intact, right hemiparesis Both RUE and RLE 4/5. Decreased to light touch of bilateral forehead, right cheek, RUE and RLE--> Improving   Psychiatric - Mood stable, Affect WNL  Skin:  1x1 stage 2 pressure wound of the right buttock

## 2025-05-02 NOTE — PROGRESS NOTE ADULT - ASSESSMENT
75y with HTN and Raynaud's phenomenon admitted to [facility] for 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  Hospital Course complicated by urinary retention, leukocytosis, and chest pain. Patient now admitted for a multidisciplinary rehab program.     #Acute left dorsal pontine hemorrhage  #Right hemiparesis: improving  #Left eye diplopia: improved   -goal SBP goal now:  under 140mmHg [ normotension] unless otherwise instructed by neurology/neurosurgery  -No PFO present on TTE  -evaluated by neurosurgical team and temporarily managed in NSICU - no surgical intervention required   -approved for DVT chemoprophylaxis by neurosurgery   - pt/ot   - requires repeat MRI of the head in 6 weeks and outpatient neurosurgery follow up    # HTN  # Orthostatic hypotension and dizziness  # h/o Hypertensive emergency and chest pain: now resolved  - OH+ during PT 4/29> reduced Lisinopril dose back to 10 mg BID with holding. OH was neg with this regimen.   - lisinopril 10mg BID  - Target BP under 140mmHg now  - TTE performed: negative for PFO w/ EF 70-75% and mild aortic stenosis   - patient was advised follow up with PCP in one week for repeat BP with positional changes  - encouraged PO hydration  - Cardiac follow up outpatient in 2 weeks post-dc     # Hyponatremia:  improved    # Transaminitis, resolved   - Improving  - denies abdominal pain  - Limit Tylenol use  - Monitor      # DVT prophylaxis:  Lovenox    d/w rehab team

## 2025-05-02 NOTE — PROGRESS NOTE ADULT - NSPROGADDITIONALINFOA_GEN_ALL_CORE
Spent 45 minutes on face-face visit, evaluating, examining, preparing discharge, discuss discharge meds and F/U visits excluding teaching time

## 2025-05-02 NOTE — PROGRESS NOTE ADULT - SUBJECTIVE AND OBJECTIVE BOX
75y old  Female who presents with a chief complaint of Acute pontine intracranial hemorrhage     seen at the bedside, no new complaints, in good spirits.    Vital Signs Last 24 Hrs  T(C): 36.4 (01 May 2025 19:58), Max: 36.4 (01 May 2025 19:58)  T(F): 97.5 (01 May 2025 19:58), Max: 97.5 (01 May 2025 19:58)  HR: 81 (02 May 2025 05:26) (81 - 82)  BP: 149/76 (02 May 2025 05:26) (147/74 - 149/76)  BP(mean): --  RR: 16 (01 May 2025 19:58) (16 - 16)  SpO2: 96% (01 May 2025 19:58) (96% - 96%)    Parameters below as of 01 May 2025 19:58  Patient On (Oxygen Delivery Method): room air        GENERAL- NAD  EAR/NOSE/MOUTH/THROAT -  MMM  EYES- ROSE MARY, conjunctiva and Sclera clear  NECK- supple  RESPIRATORY-  clear to auscultation bilaterally  CARDIOVASCULAR - SIS2, RRR  GI - soft NT BS present  EXTREMITIES- no pedal edema  NEUROLOGY- no gross focal deficits  PSYCHIATRY- AAO X 3                  11.8                 138  | 29   | 19           7.83  >-----------< 315     ------------------------< 79                    36.1                 4.3  | 102  | 0.56                                         Ca 9.2   Mg x     Ph x          MEDICATIONS  (STANDING):  enoxaparin Injectable 30 milliGRAM(s) SubCutaneous every 24 hours  lisinopril 10 milliGRAM(s) Oral every 12 hours  zinc oxide 40% Paste 1 Application(s) Topical daily    MEDICATIONS  (PRN):  acetaminophen     Tablet .. 650 milliGRAM(s) Oral every 6 hours PRN Mild Pain (1 - 3)  melatonin 3 milliGRAM(s) Oral at bedtime PRN Insomnia

## 2025-05-05 PROBLEM — Z86.79 PERSONAL HISTORY OF OTHER DISEASES OF THE CIRCULATORY SYSTEM: Chronic | Status: ACTIVE | Noted: 2025-04-18

## 2025-05-06 ENCOUNTER — APPOINTMENT (OUTPATIENT)
Age: 75
End: 2025-05-06
Payer: MEDICARE

## 2025-05-06 VITALS
HEIGHT: 60.5 IN | BODY MASS INDEX: 17.6 KG/M2 | TEMPERATURE: 98.4 F | WEIGHT: 92 LBS | RESPIRATION RATE: 16 BRPM | DIASTOLIC BLOOD PRESSURE: 88 MMHG | OXYGEN SATURATION: 99 % | SYSTOLIC BLOOD PRESSURE: 163 MMHG | HEART RATE: 82 BPM

## 2025-05-06 DIAGNOSIS — I10 ESSENTIAL (PRIMARY) HYPERTENSION: ICD-10-CM

## 2025-05-06 DIAGNOSIS — Z09 ENCOUNTER FOR FOLLOW-UP EXAMINATION AFTER COMPLETED TREATMENT FOR CONDITIONS OTHER THAN MALIGNANT NEOPLASM: ICD-10-CM

## 2025-05-06 DIAGNOSIS — I61.9 NONTRAUMATIC INTRACEREBRAL HEMORRHAGE, UNSPECIFIED: ICD-10-CM

## 2025-05-06 PROCEDURE — 99496 TRANSJ CARE MGMT HIGH F2F 7D: CPT

## 2025-05-06 RX ORDER — LISINOPRIL 10 MG/1
10 TABLET ORAL
Qty: 180 | Refills: 3 | Status: ACTIVE | COMMUNITY
Start: 2025-05-06 | End: 1900-01-01

## 2025-05-08 DIAGNOSIS — Q28.3 OTHER MALFORMATIONS OF CEREBRAL VESSELS: ICD-10-CM

## 2025-05-17 LAB
APPEARANCE: ABNORMAL
BACTERIA: ABNORMAL /HPF
BILIRUBIN URINE: NEGATIVE
BLOOD URINE: NEGATIVE
CAST: 3 /LPF
COLOR: YELLOW
EPITHELIAL CELLS: 5 /HPF
GLUCOSE QUALITATIVE U: NEGATIVE MG/DL
KETONES URINE: NEGATIVE MG/DL
LEUKOCYTE ESTERASE URINE: ABNORMAL
MICROSCOPIC-UA: NORMAL
NITRITE URINE: POSITIVE
PH URINE: 7.5
PROTEIN URINE: 30 MG/DL
RED BLOOD CELLS URINE: 3 /HPF
SPECIFIC GRAVITY URINE: 1.02
UROBILINOGEN URINE: 0.2 MG/DL
WHITE BLOOD CELLS URINE: 105 /HPF

## 2025-05-21 ENCOUNTER — APPOINTMENT (OUTPATIENT)
Dept: MRI IMAGING | Facility: HOSPITAL | Age: 75
End: 2025-05-21
Payer: MEDICARE

## 2025-05-21 ENCOUNTER — OUTPATIENT (OUTPATIENT)
Dept: OUTPATIENT SERVICES | Facility: HOSPITAL | Age: 75
LOS: 1 days | End: 2025-05-21
Payer: MEDICARE

## 2025-05-21 DIAGNOSIS — I61.9 NONTRAUMATIC INTRACEREBRAL HEMORRHAGE, UNSPECIFIED: ICD-10-CM

## 2025-05-21 DIAGNOSIS — N30.00 ACUTE CYSTITIS W/OUT HEMATURIA: ICD-10-CM

## 2025-05-21 DIAGNOSIS — Q28.3 OTHER MALFORMATIONS OF CEREBRAL VESSELS: ICD-10-CM

## 2025-05-21 PROCEDURE — 70553 MRI BRAIN STEM W/O & W/DYE: CPT | Mod: 26

## 2025-05-21 PROCEDURE — 70553 MRI BRAIN STEM W/O & W/DYE: CPT

## 2025-05-21 PROCEDURE — A9579: CPT

## 2025-05-22 LAB
APPEARANCE: ABNORMAL
BACTERIA: ABNORMAL /HPF
BILIRUBIN URINE: NEGATIVE
BLOOD URINE: NEGATIVE
CAST: 2 /LPF
COARSE GRANULAR CASTS: PRESENT
COLOR: YELLOW
EPITHELIAL CELLS: 3 /HPF
GLUCOSE QUALITATIVE U: NEGATIVE MG/DL
KETONES URINE: NEGATIVE MG/DL
LEUKOCYTE ESTERASE URINE: ABNORMAL
MICROSCOPIC-UA: NORMAL
NITRITE URINE: POSITIVE
PH URINE: 7.5
PROTEIN URINE: 30 MG/DL
RED BLOOD CELLS URINE: 1 /HPF
REVIEW: NORMAL
SPECIFIC GRAVITY URINE: 1.02
TRANSITIONAL EPITHELIAL CELLS: PRESENT
UROBILINOGEN URINE: 0.2 MG/DL
WHITE BLOOD CELLS URINE: 153 /HPF

## 2025-05-26 LAB — BACTERIA UR CULT: ABNORMAL

## 2025-05-29 ENCOUNTER — APPOINTMENT (OUTPATIENT)
Dept: NEUROSURGERY | Facility: CLINIC | Age: 75
End: 2025-05-29
Payer: MEDICARE

## 2025-05-29 VITALS
SYSTOLIC BLOOD PRESSURE: 178 MMHG | WEIGHT: 94.06 LBS | HEIGHT: 61.5 IN | DIASTOLIC BLOOD PRESSURE: 77 MMHG | HEART RATE: 73 BPM | TEMPERATURE: 98.1 F | BODY MASS INDEX: 17.53 KG/M2

## 2025-05-29 DIAGNOSIS — Q28.3 OTHER MALFORMATIONS OF CEREBRAL VESSELS: ICD-10-CM

## 2025-05-29 PROCEDURE — 99214 OFFICE O/P EST MOD 30 MIN: CPT

## 2025-05-29 RX ORDER — CEFUROXIME AXETIL 500 MG/1
500 TABLET, FILM COATED ORAL
Refills: 0 | Status: ACTIVE | COMMUNITY

## 2025-06-02 PROCEDURE — 97165 OT EVAL LOW COMPLEX 30 MIN: CPT | Mod: GO

## 2025-06-02 PROCEDURE — 92610 EVALUATE SWALLOWING FUNCTION: CPT | Mod: GN

## 2025-06-02 PROCEDURE — 92523 SPEECH SOUND LANG COMPREHEN: CPT | Mod: GN

## 2025-06-02 PROCEDURE — 80053 COMPREHEN METABOLIC PANEL: CPT

## 2025-06-02 PROCEDURE — 87635 SARS-COV-2 COVID-19 AMP PRB: CPT

## 2025-06-02 PROCEDURE — 80048 BASIC METABOLIC PNL TOTAL CA: CPT

## 2025-06-02 PROCEDURE — 92507 TX SP LANG VOICE COMM INDIV: CPT | Mod: GN

## 2025-06-02 PROCEDURE — 97535 SELF CARE MNGMENT TRAINING: CPT | Mod: GO

## 2025-06-02 PROCEDURE — 36415 COLL VENOUS BLD VENIPUNCTURE: CPT

## 2025-06-02 PROCEDURE — 97116 GAIT TRAINING THERAPY: CPT | Mod: GP

## 2025-06-02 PROCEDURE — 97161 PT EVAL LOW COMPLEX 20 MIN: CPT | Mod: GP

## 2025-06-02 PROCEDURE — 97110 THERAPEUTIC EXERCISES: CPT | Mod: GO

## 2025-06-02 PROCEDURE — 85025 COMPLETE CBC W/AUTO DIFF WBC: CPT

## 2025-06-02 PROCEDURE — 97530 THERAPEUTIC ACTIVITIES: CPT | Mod: GO

## 2025-06-02 PROCEDURE — 97112 NEUROMUSCULAR REEDUCATION: CPT | Mod: GP

## 2025-06-05 ENCOUNTER — APPOINTMENT (OUTPATIENT)
Dept: PHYSICAL MEDICINE AND REHAB | Facility: CLINIC | Age: 75
End: 2025-06-05
Payer: MEDICARE

## 2025-06-05 ENCOUNTER — NON-APPOINTMENT (OUTPATIENT)
Age: 75
End: 2025-06-05

## 2025-06-05 VITALS
DIASTOLIC BLOOD PRESSURE: 87 MMHG | HEIGHT: 60 IN | TEMPERATURE: 97.3 F | SYSTOLIC BLOOD PRESSURE: 156 MMHG | OXYGEN SATURATION: 99 % | HEART RATE: 76 BPM | BODY MASS INDEX: 18.46 KG/M2 | WEIGHT: 94 LBS

## 2025-06-05 DIAGNOSIS — R26.89 OTHER ABNORMALITIES OF GAIT AND MOBILITY: ICD-10-CM

## 2025-06-05 DIAGNOSIS — H53.8 OTHER VISUAL DISTURBANCES: ICD-10-CM

## 2025-06-05 DIAGNOSIS — I61.9 NONTRAUMATIC INTRACEREBRAL HEMORRHAGE, UNSPECIFIED: ICD-10-CM

## 2025-06-05 PROCEDURE — 99214 OFFICE O/P EST MOD 30 MIN: CPT

## 2025-06-17 ENCOUNTER — APPOINTMENT (OUTPATIENT)
Age: 75
End: 2025-06-17
Payer: MEDICARE

## 2025-06-17 VITALS
HEART RATE: 79 BPM | HEIGHT: 60 IN | BODY MASS INDEX: 17.67 KG/M2 | DIASTOLIC BLOOD PRESSURE: 82 MMHG | RESPIRATION RATE: 16 BRPM | WEIGHT: 90 LBS | TEMPERATURE: 97.6 F | OXYGEN SATURATION: 9 % | SYSTOLIC BLOOD PRESSURE: 146 MMHG

## 2025-06-17 PROBLEM — R63.4 ABNORMAL WEIGHT LOSS: Status: ACTIVE | Noted: 2025-06-17

## 2025-06-17 PROCEDURE — 99214 OFFICE O/P EST MOD 30 MIN: CPT

## 2025-08-06 ENCOUNTER — APPOINTMENT (OUTPATIENT)
Dept: PHYSICAL MEDICINE AND REHAB | Facility: CLINIC | Age: 75
End: 2025-08-06
Payer: MEDICARE

## 2025-08-06 VITALS
SYSTOLIC BLOOD PRESSURE: 156 MMHG | TEMPERATURE: 98.4 F | DIASTOLIC BLOOD PRESSURE: 84 MMHG | HEART RATE: 86 BPM | OXYGEN SATURATION: 95 % | BODY MASS INDEX: 17.67 KG/M2 | HEIGHT: 60 IN | WEIGHT: 90 LBS

## 2025-08-06 DIAGNOSIS — I10 ESSENTIAL (PRIMARY) HYPERTENSION: ICD-10-CM

## 2025-08-06 DIAGNOSIS — I61.9 NONTRAUMATIC INTRACEREBRAL HEMORRHAGE, UNSPECIFIED: ICD-10-CM

## 2025-08-06 PROCEDURE — 99213 OFFICE O/P EST LOW 20 MIN: CPT

## 2025-09-08 ENCOUNTER — LABORATORY RESULT (OUTPATIENT)
Age: 75
End: 2025-09-08